# Patient Record
Sex: MALE | Race: BLACK OR AFRICAN AMERICAN | NOT HISPANIC OR LATINO | URBAN - METROPOLITAN AREA
[De-identification: names, ages, dates, MRNs, and addresses within clinical notes are randomized per-mention and may not be internally consistent; named-entity substitution may affect disease eponyms.]

---

## 2017-02-07 ENCOUNTER — LAB REQUISITION (OUTPATIENT)
Dept: LAB | Facility: HOSPITAL | Age: 42
End: 2017-02-07
Payer: COMMERCIAL

## 2017-02-07 DIAGNOSIS — H60.399 OTHER INFECTIVE OTITIS EXTERNA, UNSPECIFIED EAR: ICD-10-CM

## 2017-02-07 PROCEDURE — 87205 SMEAR GRAM STAIN: CPT | Performed by: OTOLARYNGOLOGY

## 2017-02-07 PROCEDURE — 87070 CULTURE OTHR SPECIMN AEROBIC: CPT | Performed by: OTOLARYNGOLOGY

## 2017-02-07 PROCEDURE — 87102 FUNGUS ISOLATION CULTURE: CPT | Performed by: OTOLARYNGOLOGY

## 2017-02-10 LAB
BACTERIA WND AEROBE CULT: NO GROWTH
GRAM STN SPEC: NORMAL

## 2017-03-13 LAB — FUNGUS SPEC CULT: NORMAL

## 2022-08-24 ENCOUNTER — HOSPITAL ENCOUNTER (EMERGENCY)
Facility: HOSPITAL | Age: 47
Discharge: HOME | End: 2022-08-24
Attending: EMERGENCY MEDICINE
Payer: COMMERCIAL

## 2022-08-24 ENCOUNTER — APPOINTMENT (EMERGENCY)
Dept: CT IMAGING | Facility: HOSPITAL | Age: 47
End: 2022-08-24
Payer: COMMERCIAL

## 2022-08-24 VITALS
HEART RATE: 59 BPM | BODY MASS INDEX: 39.32 KG/M2 | WEIGHT: 280.87 LBS | RESPIRATION RATE: 17 BRPM | HEIGHT: 71 IN | TEMPERATURE: 97.8 F | OXYGEN SATURATION: 100 % | SYSTOLIC BLOOD PRESSURE: 164 MMHG | DIASTOLIC BLOOD PRESSURE: 89 MMHG

## 2022-08-24 DIAGNOSIS — K92.2 LOWER GI BLEED: Primary | ICD-10-CM

## 2022-08-24 DIAGNOSIS — K57.90 DIVERTICULOSIS: ICD-10-CM

## 2022-08-24 LAB
ALBUMIN SERPL BCP-MCNC: 4.3 G/DL (ref 3.5–5)
ALP SERPL-CCNC: 56 U/L (ref 34–104)
ALT SERPL W P-5'-P-CCNC: 25 U/L (ref 7–52)
ANION GAP SERPL CALCULATED.3IONS-SCNC: 6 MMOL/L (ref 4–13)
ANION GAP SERPL CALCULATED.3IONS-SCNC: 6 MMOL/L (ref 4–13)
AST SERPL W P-5'-P-CCNC: 45 U/L (ref 13–39)
BASOPHILS # BLD AUTO: 0.02 THOUSANDS/ΜL (ref 0–0.1)
BASOPHILS NFR BLD AUTO: 0 % (ref 0–1)
BILIRUB SERPL-MCNC: 0.5 MG/DL (ref 0.2–1)
BUN SERPL-MCNC: 12 MG/DL (ref 5–25)
BUN SERPL-MCNC: 14 MG/DL (ref 5–25)
CALCIUM SERPL-MCNC: 9 MG/DL (ref 8.4–10.2)
CALCIUM SERPL-MCNC: 9.1 MG/DL (ref 8.4–10.2)
CARDIAC TROPONIN I PNL SERPL HS: <2 NG/L
CHLORIDE SERPL-SCNC: 108 MMOL/L (ref 96–108)
CHLORIDE SERPL-SCNC: 109 MMOL/L (ref 96–108)
CO2 SERPL-SCNC: 25 MMOL/L (ref 21–32)
CO2 SERPL-SCNC: 25 MMOL/L (ref 21–32)
CREAT SERPL-MCNC: 0.98 MG/DL (ref 0.6–1.3)
CREAT SERPL-MCNC: 1.02 MG/DL (ref 0.6–1.3)
EOSINOPHIL # BLD AUTO: 0.15 THOUSAND/ΜL (ref 0–0.61)
EOSINOPHIL NFR BLD AUTO: 2 % (ref 0–6)
ERYTHROCYTE [DISTWIDTH] IN BLOOD BY AUTOMATED COUNT: 15.5 % (ref 11.6–15.1)
FLUAV RNA RESP QL NAA+PROBE: NEGATIVE
FLUBV RNA RESP QL NAA+PROBE: NEGATIVE
GFR SERPL CREATININE-BSD FRML MDRD: 86 ML/MIN/1.73SQ M
GFR SERPL CREATININE-BSD FRML MDRD: 90 ML/MIN/1.73SQ M
GLUCOSE SERPL-MCNC: 89 MG/DL (ref 65–140)
GLUCOSE SERPL-MCNC: 90 MG/DL (ref 65–140)
HCT VFR BLD AUTO: 41.1 % (ref 36.5–49.3)
HGB BLD-MCNC: 12.9 G/DL (ref 12–17)
IMM GRANULOCYTES # BLD AUTO: 0.02 THOUSAND/UL (ref 0–0.2)
IMM GRANULOCYTES NFR BLD AUTO: 0 % (ref 0–2)
LIPASE SERPL-CCNC: 27 U/L (ref 11–82)
LYMPHOCYTES # BLD AUTO: 1.56 THOUSANDS/ΜL (ref 0.6–4.47)
LYMPHOCYTES NFR BLD AUTO: 25 % (ref 14–44)
MAGNESIUM SERPL-MCNC: 2.1 MG/DL (ref 1.9–2.7)
MCH RBC QN AUTO: 25.4 PG (ref 26.8–34.3)
MCHC RBC AUTO-ENTMCNC: 31.4 G/DL (ref 31.4–37.4)
MCV RBC AUTO: 81 FL (ref 82–98)
MONOCYTES # BLD AUTO: 0.37 THOUSAND/ΜL (ref 0.17–1.22)
MONOCYTES NFR BLD AUTO: 6 % (ref 4–12)
NEUTROPHILS # BLD AUTO: 4.08 THOUSANDS/ΜL (ref 1.85–7.62)
NEUTS SEG NFR BLD AUTO: 67 % (ref 43–75)
NRBC BLD AUTO-RTO: 0 /100 WBCS
PLATELET # BLD AUTO: 332 THOUSANDS/UL (ref 149–390)
PMV BLD AUTO: 10.2 FL (ref 8.9–12.7)
POTASSIUM SERPL-SCNC: 4 MMOL/L (ref 3.5–5.3)
POTASSIUM SERPL-SCNC: 5.9 MMOL/L (ref 3.5–5.3)
PROT SERPL-MCNC: 7.7 G/DL (ref 6.4–8.4)
RBC # BLD AUTO: 5.07 MILLION/UL (ref 3.88–5.62)
RSV RNA RESP QL NAA+PROBE: NEGATIVE
SARS-COV-2 RNA RESP QL NAA+PROBE: NEGATIVE
SODIUM SERPL-SCNC: 139 MMOL/L (ref 135–147)
SODIUM SERPL-SCNC: 140 MMOL/L (ref 135–147)
WBC # BLD AUTO: 6.2 THOUSAND/UL (ref 4.31–10.16)

## 2022-08-24 PROCEDURE — 96374 THER/PROPH/DIAG INJ IV PUSH: CPT

## 2022-08-24 PROCEDURE — 0241U HB NFCT DS VIR RESP RNA 4 TRGT: CPT | Performed by: PHYSICIAN ASSISTANT

## 2022-08-24 PROCEDURE — 74177 CT ABD & PELVIS W/CONTRAST: CPT

## 2022-08-24 PROCEDURE — 80053 COMPREHEN METABOLIC PANEL: CPT | Performed by: PHYSICIAN ASSISTANT

## 2022-08-24 PROCEDURE — 99284 EMERGENCY DEPT VISIT MOD MDM: CPT | Performed by: PHYSICIAN ASSISTANT

## 2022-08-24 PROCEDURE — 85025 COMPLETE CBC W/AUTO DIFF WBC: CPT | Performed by: PHYSICIAN ASSISTANT

## 2022-08-24 PROCEDURE — 99285 EMERGENCY DEPT VISIT HI MDM: CPT

## 2022-08-24 PROCEDURE — 36415 COLL VENOUS BLD VENIPUNCTURE: CPT | Performed by: PHYSICIAN ASSISTANT

## 2022-08-24 PROCEDURE — 93005 ELECTROCARDIOGRAM TRACING: CPT

## 2022-08-24 PROCEDURE — 84484 ASSAY OF TROPONIN QUANT: CPT | Performed by: PHYSICIAN ASSISTANT

## 2022-08-24 PROCEDURE — 83690 ASSAY OF LIPASE: CPT | Performed by: PHYSICIAN ASSISTANT

## 2022-08-24 PROCEDURE — 80048 BASIC METABOLIC PNL TOTAL CA: CPT | Performed by: PHYSICIAN ASSISTANT

## 2022-08-24 PROCEDURE — 96375 TX/PRO/DX INJ NEW DRUG ADDON: CPT

## 2022-08-24 PROCEDURE — 96361 HYDRATE IV INFUSION ADD-ON: CPT

## 2022-08-24 PROCEDURE — 83735 ASSAY OF MAGNESIUM: CPT | Performed by: PHYSICIAN ASSISTANT

## 2022-08-24 RX ORDER — ONDANSETRON 2 MG/ML
4 INJECTION INTRAMUSCULAR; INTRAVENOUS ONCE
Status: COMPLETED | OUTPATIENT
Start: 2022-08-24 | End: 2022-08-24

## 2022-08-24 RX ORDER — KETOROLAC TROMETHAMINE 30 MG/ML
15 INJECTION, SOLUTION INTRAMUSCULAR; INTRAVENOUS ONCE
Status: COMPLETED | OUTPATIENT
Start: 2022-08-24 | End: 2022-08-24

## 2022-08-24 RX ADMIN — ONDANSETRON 4 MG: 2 INJECTION INTRAMUSCULAR; INTRAVENOUS at 11:35

## 2022-08-24 RX ADMIN — KETOROLAC TROMETHAMINE 15 MG: 30 INJECTION, SOLUTION INTRAMUSCULAR at 13:48

## 2022-08-24 RX ADMIN — SODIUM CHLORIDE 1000 ML: 0.9 INJECTION, SOLUTION INTRAVENOUS at 12:51

## 2022-08-24 RX ADMIN — IOHEXOL 75 ML: 350 INJECTION, SOLUTION INTRAVENOUS at 12:45

## 2022-08-24 NOTE — ED NOTES
Patient transported to [address] [place] [address],[address], [facility]  08/24/22 1260

## 2022-08-24 NOTE — ED PROVIDER NOTES
History  Chief Complaint   Patient presents with    Rectal Bleeding     Pt c/o abdominal pain, headache, feeling weak, and rectal bleeding  Pt reports the rectal bleeding is bright and dark red and he sees it in his stool, the toliet, and when he wipes  Pt reports he had a toothache and the dentist started him on amoxicillin which is when this all started  Pt son is allergic to amoxicillin  Pt does not know if he has had amoxicillin before this time  Patient is a 42-year-old male with a past medical history asthma, prostate cancer and hypertension, presenting to the ED for evaluation of bloody stools x2 days  Patient states that his dentist started him on amoxicillin on  for a dental infection  Over the past 2 days, he has been having multiple bloody bowel movements throughout the day  He describes them as a dark maroon color mixed with bright red blood  His stools have been a mixture of diarrhea and solid stools  He reports associated nausea and generalized abdominal pain  He states that he is now starting to have generalized weakness, lightheadeness and fatigue  He also feels like he is becoming tired and "out of breath" with exertion more quickly than usual  He denies any fevers, chills, chest pain, orthopnea, lower extremity edema or calf tenderness  He denies any history of abdominal surgeries  He does report a history of radiation to treat his prostate cancer  Prior to Admission Medications   Prescriptions Last Dose Informant Patient Reported? Taking?    amLODIPine (NORVASC) 10 mg tablet 2022 at Unknown time  Yes Yes   Sig: Take 10 mg by mouth daily   atorvastatin (LIPITOR) 20 mg tablet 2022 at Unknown time  Yes Yes   Sig: Take 20 mg by mouth   celecoxib (CeleBREX) 100 mg capsule 2022 at Unknown time  Yes Yes   Sig: Take 1 capsule by mouth 2 (two) times a day   divalproex sodium (DEPAKOTE ER) 500 mg 24 hr tablet 2022 at Unknown time  Yes Yes   Si tab po qam and 2 tabs po qhs   leuprolide (LUPRON DEPOT 3 MONTH KIT) 22 5 mg injection   Yes No   Sig: Inject 22 5 mg into a muscle   lisinopril (ZESTRIL) 40 mg tablet 2022 at Unknown time  Yes Yes   Sig: Take 40 mg by mouth daily   prazosin (MINIPRESS) 1 mg capsule 2022 at Unknown time  Yes Yes   Si tab po qhs x 1 week then 2 tabs qhs   traMADol (ULTRAM) 50 mg tablet 2022 at Unknown time  Yes Yes   venlafaxine (EFFEXOR-XR) 150 mg 24 hr capsule 2022 at Unknown time  Yes Yes   Sig: Take 300 mg by mouth daily      Facility-Administered Medications: None       Past Medical History:   Diagnosis Date    Asthma     Cancer ([facility] [address] )     History of psychiatric treatment     Hypertension        Past Surgical History:   Procedure Laterality Date    US GUIDED FINE NEEDLE ASPIRATION (ALL INC)  2014       Family History   Problem Relation Age of Onset    Cancer Other     Hypertension Other     Hyperlipidemia Other      I have reviewed and agree with the history as documented  E-Cigarette/Vaping    E-Cigarette Use Never User      E-Cigarette/Vaping Substances     Social History     Tobacco Use    Smoking status: Never Smoker    Smokeless tobacco: Never Used   Vaping Use    Vaping Use: Never used   Substance Use Topics    Alcohol use: Not Currently    Drug use: Never       Review of Systems   Constitutional: Positive for fatigue  Negative for chills, diaphoresis and fever  HENT: Negative for congestion, ear pain, mouth sores, rhinorrhea, sinus pain, sore throat and trouble swallowing  Eyes: Negative for photophobia and visual disturbance  Respiratory: Negative for cough, chest tightness, shortness of breath and wheezing  Cardiovascular: Negative for chest pain, palpitations and leg swelling  Gastrointestinal: Positive for abdominal pain, blood in stool and nausea  Negative for constipation, diarrhea and vomiting     Genitourinary: Negative for dysuria, flank pain, frequency, hematuria and urgency  Musculoskeletal: Negative for arthralgias, back pain, gait problem, joint swelling, myalgias and neck pain  Skin: Negative for color change, pallor and rash  Neurological: Positive for weakness (Generalized) and light-headedness  Negative for dizziness, syncope, speech difficulty, numbness and headaches  All other systems reviewed and are negative  Physical Exam  Physical Exam  Vitals and nursing note reviewed  Constitutional:       General: He is awake  He is not in acute distress  Appearance: Normal appearance  He is well-developed  He is not ill-appearing or diaphoretic  HENT:      Head: Normocephalic and atraumatic  Right Ear: External ear normal       Left Ear: External ear normal       Nose: Nose normal       Mouth/Throat:      Lips: Pink  Mouth: Mucous membranes are moist    Eyes:      General: Lids are normal  No scleral icterus  Conjunctiva/sclera: Conjunctivae normal       Pupils: Pupils are equal, round, and reactive to light  Cardiovascular:      Rate and Rhythm: Normal rate and regular rhythm  Pulses: Normal pulses  Radial pulses are 2+ on the right side and 2+ on the left side  Heart sounds: Normal heart sounds, S1 normal and S2 normal    Pulmonary:      Effort: Pulmonary effort is normal  No accessory muscle usage  Breath sounds: Normal breath sounds  No stridor  No decreased breath sounds, wheezing, rhonchi or rales  Abdominal:      General: Abdomen is flat  Bowel sounds are normal  There is no distension  Palpations: Abdomen is soft  Tenderness: There is generalized abdominal tenderness  There is no right CVA tenderness, left CVA tenderness, guarding or rebound  Genitourinary:     Comments: No thrombosed/external hemorrhoids  No stool in rectal vault - unable to perform accurate guaiac  No evidence of active hemorrhage    Musculoskeletal:      Cervical back: Full passive range of motion without pain, normal range of motion and neck supple  No signs of trauma  No pain with movement  Normal range of motion  Right lower leg: No edema  Left lower leg: No edema  Lymphadenopathy:      Cervical: No cervical adenopathy  Skin:     General: Skin is warm and dry  Capillary Refill: Capillary refill takes less than 2 seconds  Coloration: Skin is not cyanotic, jaundiced or pale  Neurological:      Mental Status: He is alert and oriented to person, place, and time  GCS: GCS eye subscore is 4  GCS verbal subscore is 5  GCS motor subscore is 6  Cranial Nerves: No dysarthria or facial asymmetry  Gait: Gait normal    Psychiatric:         Attention and Perception: Attention normal          Mood and Affect: Mood normal          Speech: Speech normal          Behavior: Behavior normal  Behavior is cooperative           Vital Signs  ED Triage Vitals [08/24/22 1019]   Temperature Pulse Respirations Blood Pressure SpO2   97 8 °F (36 6 °C) 65 18 139/73 100 %      Temp Source Heart Rate Source Patient Position - Orthostatic VS BP Location FiO2 (%)   Oral Monitor Sitting Right arm --      Pain Score       8           Vitals:    08/24/22 1019 08/24/22 1145 08/24/22 1315   BP: 139/73 164/89    Pulse: 65 61 59   Patient Position - Orthostatic VS: Sitting Lying          Visual Acuity      ED Medications  Medications   ondansetron (ZOFRAN) injection 4 mg (4 mg Intravenous Given 8/24/22 1135)   sodium chloride 0 9 % bolus 1,000 mL (0 mL Intravenous Stopped 8/24/22 1530)   iohexol (OMNIPAQUE) 350 MG/ML injection (MULTI-DOSE) 75 mL (75 mL Intravenous Given 8/24/22 1245)   ketorolac (TORADOL) injection 15 mg (15 mg Intravenous Given 8/24/22 1348)       Diagnostic Studies  Results Reviewed     Procedure Component Value Units Date/Time    Basic metabolic panel [[id number]]  (Abnormal) Collected: 08/24/22 1345    Lab Status: Final result Specimen: Blood from Arm, Right Updated: 08/24/22 1410     Sodium 140 mmol/L      Potassium 4 0 mmol/L      Chloride 109 mmol/L      CO2 25 mmol/L      ANION GAP 6 mmol/L      BUN 12 mg/dL      Creatinine 0 98 mg/dL      Glucose 90 mg/dL      Calcium 9 0 mg/dL      eGFR 90 ml/min/1 73sq m     Narrative:      [place] guidelines for Chronic Kidney Disease (CKD):     Stage 1 with normal or high GFR (GFR > 90 mL/min/1 73 square meters)    Stage 2 Mild CKD (GFR = 60-89 mL/min/1 73 square meters)    Stage 3A Moderate CKD (GFR = 45-59 mL/min/1 73 square meters)    Stage 3B Moderate CKD (GFR = 30-44 mL/min/1 73 square meters)    Stage 4 Severe CKD (GFR = 15-29 mL/min/1 73 square meters)    Stage 5 End Stage CKD (GFR <15 mL/min/1 73 square meters)  Note: GFR calculation is accurate only with a steady state creatinine    FLU/RSV/COVID - if FLU/RSV clinically relevant [[id number]]  (Normal) Collected: 08/24/22 1251    Lab Status: Final result Specimen: Nares from Nose Updated: 08/24/22 1336     SARS-CoV-2 Negative     INFLUENZA A PCR Negative     INFLUENZA B PCR Negative     RSV PCR Negative    Narrative:      FOR PEDIATRIC PATIENTS - copy/paste COVID Guidelines URL to browser: [email or web address]    SARS-CoV-2 assay is a Nucleic Acid Amplification assay intended for the  qualitative detection of nucleic acid from SARS-CoV-2 in nasopharyngeal  swabs  Results are for the presumptive identification of SARS-CoV-2 RNA  Positive results are indicative of infection with SARS-CoV-2, the virus  causing COVID-19, but do not rule out bacterial infection or co-infection  with other viruses  Laboratories within the [place] and its  territories are required to report all positive results to the appropriate  public health authorities  Negative results do not preclude SARS-CoV-2  infection and should not be used as the sole basis for treatment or other  patient management decisions   Negative results must be combined with  clinical observations, patient history, and epidemiological information  This test has not been FDA cleared or approved  This test has been authorized by FDA under an Emergency Use Authorization  (EUA)  This test is only authorized for the duration of time the  declaration that circumstances exist justifying the authorization of the  emergency use of an in vitro diagnostic tests for detection of SARS-CoV-2  virus and/or diagnosis of COVID-19 infection under section 564(b)(1) of  the Act, 21 U  S C  235ISN-4(F)(0), unless the authorization is terminated  or revoked sooner  The test has been validated but independent review by FDA  and CLIA is pending  Test performed using [employer] GeneXpert: This RT-PCR assay targets N2,  a region unique to SARS-CoV-2  A conserved region in the E-gene was chosen  for pan-Sarbecovirus detection which includes SARS-CoV-2      HS Troponin 0hr (reflex protocol) [[id number]]  (Normal) Collected: 08/24/22 1134    Lab Status: Final result Specimen: Blood from Arm, Left Updated: 08/24/22 1250     hs TnI 0hr <2 0 ng/L     Comprehensive metabolic panel [[id number]]  (Abnormal) Collected: 08/24/22 1134    Lab Status: Final result Specimen: Blood from Arm, Left Updated: 08/24/22 1223     Sodium 139 mmol/L      Potassium 5 9 mmol/L      Chloride 108 mmol/L      CO2 25 mmol/L      ANION GAP 6 mmol/L      BUN 14 mg/dL      Creatinine 1 02 mg/dL      Glucose 89 mg/dL      Calcium 9 1 mg/dL      AST 45 U/L      ALT 25 U/L      Alkaline Phosphatase 56 U/L      Total Protein 7 7 g/dL      Albumin 4 3 g/dL      Total Bilirubin 0 50 mg/dL      eGFR 86 ml/min/1 73sq m     Narrative:      [place] guidelines for Chronic Kidney Disease (CKD):     Stage 1 with normal or high GFR (GFR > 90 mL/min/1 73 square meters)    Stage 2 Mild CKD (GFR = 60-89 mL/min/1 73 square meters)    Stage 3A Moderate CKD (GFR = 45-59 mL/min/1 73 square meters)    Stage 3B Moderate CKD (GFR = 30-44 mL/min/1 73 square meters)    Stage 4 Severe CKD (GFR = 15-29 mL/min/1 73 square meters)    Stage 5 End Stage CKD (GFR <15 mL/min/1 73 square meters)  Note: GFR calculation is accurate only with a steady state creatinine    Lipase [[id number]]  (Normal) Collected: 08/24/22 1134    Lab Status: Final result Specimen: Blood from Arm, Left Updated: 08/24/22 1221     Lipase 27 u/L     Magnesium [[id number]]  (Normal) Collected: 08/24/22 1134    Lab Status: Final result Specimen: Blood from Arm, Left Updated: 08/24/22 1221     Magnesium 2 1 mg/dL     CBC and differential [[id number]]  (Abnormal) Collected: 08/24/22 1134    Lab Status: Final result Specimen: Blood from Arm, Left Updated: 08/24/22 1202     WBC 6 20 Thousand/uL      RBC 5 07 Million/uL      Hemoglobin 12 9 g/dL      Hematocrit 41 1 %      MCV 81 fL      MCH 25 4 pg      MCHC 31 4 g/dL      RDW 15 5 %      MPV 10 2 fL      Platelets 257 Thousands/uL      nRBC 0 /100 WBCs      Neutrophils Relative 67 %      Immat GRANS % 0 %      Lymphocytes Relative 25 %      Monocytes Relative 6 %      Eosinophils Relative 2 %      Basophils Relative 0 %      Neutrophils Absolute 4 08 Thousands/µL      Immature Grans Absolute 0 02 Thousand/uL      Lymphocytes Absolute 1 56 Thousands/µL      Monocytes Absolute 0 37 Thousand/µL      Eosinophils Absolute 0 15 Thousand/µL      Basophils Absolute 0 02 Thousands/µL                  CT abdomen pelvis with contrast   Final Result by Lou Pennington DO (08/24 1323)      No acute intra-abdominal abnormality  Colonic diverticulosis without evidence of diverticulitis  Several incidental findings as above        Workstation performed: [id number]                    Procedures  ECG 12 Lead Documentation Only    Date/Time: 8/24/2022 11:47 AM  Performed by: Danika Barron PA-C  Authorized by: Danika Barron PA-C     Indications / Diagnosis:  Weakness  ECG reviewed by me, the ED Provider: yes    Patient location: ED  Previous ECG:     Previous ECG:  Unavailable    Comparison to cardiac monitor: Yes    Interpretation:     Interpretation: non-specific    Rate:     ECG rate:  52    ECG rate assessment: bradycardic    Rhythm:     Rhythm: sinus bradycardia    Ectopy:     Ectopy: none    QRS:     QRS axis:  Normal    QRS intervals:  Normal  Conduction:     Conduction: normal    ST segments:     ST segments:  Normal  T waves:     T waves: normal    Comments:      No STEMI  QT/QTc 478/444             ED Course             HEART Risk Score    Flowsheet Row Most Recent Value   Heart Score Risk Calculator    History 0 Filed at: 08/24/2022 1252   ECG 0 Filed at: 08/24/2022 1252   Age 1 Filed at: 08/24/2022 1252   Risk Factors 1 Filed at: 08/24/2022 1252   Troponin 0 Filed at: 08/24/2022 1252   HEART Score 2 Filed at: 08/24/2022 1252                PERC Rule for PE    Flowsheet Row Most Recent Value   PERC Rule for PE    Age >=50 0 Filed at: 08/24/2022 1252   HR >=100 0 Filed at: 08/24/2022 1252   O2 Sat on room air < 95% 0 Filed at: 08/24/2022 1252   History of PE or DVT 0 Filed at: 08/24/2022 1252   Recent trauma or surgery 0 Filed at: 08/24/2022 1252   Hemoptysis 0 Filed at: 08/24/2022 1252   Exogenous estrogen 0 Filed at: 08/24/2022 1252   Unilateral leg swelling 0 Filed at: 08/24/2022 1252   PERC Rule for PE Results 0 Filed at: 08/24/2022 1252              SBIRT 22yo+    Flowsheet Row Most Recent Value   SBIRT (25 yo +)    In order to provide better care to our patients, we are screening all of our patients for alcohol and drug use  Would it be okay to ask you these screening questions? Unable to answer at this time Filed at: 08/24/2022 1027                    MDM  Number of Diagnoses or Management Options  Diverticulosis  Lower GI bleed  Diagnosis management comments: Patient is a 39-year-old male with a past medical history asthma cancer and hypertension, presenting to the ED for evaluation of bloody stools x2 days     CT shows no acute abnormalities  Hgb is within normal limits at 12 9 (most recent in 2021 was 14 5)  Patient had resolve of abdominal discomfort and had no bowel movements or bleeding in the ED  No evidence of hemorrhage on rectal exam   Patient is feeling significantly improved at this time and would prefer to proceed with outpatient GI follow-up/colonoscopy  Discussed with the GI advanced practitioner who will ensure patient has prompt follow-up for further evaluation  Patient was instructed to return immediately if he continued to have bloody bowel movements, worsening abdominal pain or developed new/worsening symptoms  The management plan was discussed in detail with the patient at bedside and all questions were answered  Strict ED return instructions were discussed at bedside  Prior to discharge, both verbal and written instructions were provided  We discussed the signs and symptoms that should prompt the patient to return to the ED  All questions were answered and the patient was comfortable with the plan of care and discharged home  The patient agrees to return to the Emergency Department for concerns and/or progression of illness         Amount and/or Complexity of Data Reviewed  Clinical lab tests: ordered and reviewed  Tests in the radiology section of CPT®: ordered and reviewed  Discuss the patient with other providers: yes QUINTON Ordonez (GI))    Patient Progress  Patient progress: stable      Disposition  Final diagnoses:   Lower GI bleed   Diverticulosis     Time reflects when diagnosis was documented in both MDM as applicable and the Disposition within this note     Time User Action Codes Description Comment    8/24/2022  3:17 PM Lon Tsang [K92 2] Lower GI bleed     8/24/2022  3:17 PM Lon Tsang [K57 90] Diverticulosis       ED Disposition     ED Disposition   Discharge    Condition   Stable    Date/Time   Wed Aug 24, 2022  3:17 PM    Comment   Ford Dutton discharge to home/self care                 Follow-up Information     Follow up With Specialties Details Why Contact Info Additional [facility] Schedule an appointment as soon as possible for a visit   [address]  [address], [address] [phone] [facility] [place], [address], [address], [place], [place], [phone]    Madelaine Boeck, DO Internal Medicine Schedule an appointment as soon as possible for a visit        [facility] Emergency Medicine  If symptoms worsen [address] [facility] [address] [facility], [address], [place], [place], [address]          Discharge Medication List as of 8/24/2022  3:18 PM      CONTINUE these medications which have NOT CHANGED    Details   amLODIPine (NORVASC) 10 mg tablet Take 10 mg by mouth daily, Starting Mon 8/15/2011, Historical Med      atorvastatin (LIPITOR) 20 mg tablet Take 20 mg by mouth, Starting Tue 8/24/2021, Historical Med      celecoxib (CeleBREX) 100 mg capsule Take 1 capsule by mouth 2 (two) times a day, Starting Tue 8/3/2021, Historical Med      divalproex sodium (DEPAKOTE ER) 500 mg 24 hr tablet 2 tab po qam and 2 tabs po qhs, Historical Med      lisinopril (ZESTRIL) 40 mg tablet Take 40 mg by mouth daily, Starting Tue 8/24/2021, Historical Med      prazosin (MINIPRESS) 1 mg capsule 1 tab po qhs x 1 week then 2 tabs qhs, Historical Med      traMADol (ULTRAM) 50 mg tablet Starting Fri 10/22/2021, Historical Med      venlafaxine (EFFEXOR-XR) 150 mg 24 hr capsule Take 300 mg by mouth daily, Starting Wed 10/27/2021, Until Thu 10/27/2022, Historical Med      leuprolide (LUPRON DEPOT 3 MONTH KIT) 22 5 mg injection Inject 22 5 mg into a muscle, Historical Med                 PDMP Review     None          ED Provider  Electronically Signed by           Brittney Vazquez PA-C  08/24/22 7443

## 2022-08-24 NOTE — Clinical Note
Fidelina Saez was seen and treated in our emergency department on 8/24/2022  Diagnosis:     Berenice Jean Baptiste  may return to work on return date  He may return on this date: 08/26/2022         If you have any questions or concerns, please don't hesitate to call        Kera Lopez PA-C    ______________________________           _______________          _______________  Hospital Representative                              Date                                Time

## 2022-08-25 LAB
ATRIAL RATE: 52 BPM
P AXIS: 41 DEGREES
PR INTERVAL: 202 MS
QRS AXIS: 54 DEGREES
QRSD INTERVAL: 74 MS
QT INTERVAL: 478 MS
QTC INTERVAL: 444 MS
T WAVE AXIS: 45 DEGREES
VENTRICULAR RATE: 52 BPM

## 2022-08-25 PROCEDURE — 93010 ELECTROCARDIOGRAM REPORT: CPT | Performed by: INTERNAL MEDICINE

## 2022-09-06 ENCOUNTER — OFFICE VISIT (OUTPATIENT)
Dept: GASTROENTEROLOGY | Facility: AMBULARY SURGERY CENTER | Age: 47
End: 2022-09-06
Payer: MEDICARE

## 2022-09-06 VITALS
OXYGEN SATURATION: 98 % | WEIGHT: 285 LBS | HEIGHT: 71 IN | BODY MASS INDEX: 39.9 KG/M2 | HEART RATE: 73 BPM | SYSTOLIC BLOOD PRESSURE: 150 MMHG | DIASTOLIC BLOOD PRESSURE: 99 MMHG

## 2022-09-06 DIAGNOSIS — K57.90 DIVERTICULOSIS: ICD-10-CM

## 2022-09-06 DIAGNOSIS — K92.2 LOWER GI BLEED: ICD-10-CM

## 2022-09-06 DIAGNOSIS — K21.9 GASTROESOPHAGEAL REFLUX DISEASE, UNSPECIFIED WHETHER ESOPHAGITIS PRESENT: Primary | ICD-10-CM

## 2022-09-06 PROCEDURE — 99204 OFFICE O/P NEW MOD 45 MIN: CPT | Performed by: INTERNAL MEDICINE

## 2022-09-06 RX ORDER — METHYLPHENIDATE HYDROCHLORIDE 5 MG/1
TABLET ORAL AS NEEDED
COMMUNITY
Start: 2022-08-12

## 2022-09-06 RX ORDER — SODIUM PICOSULFATE, MAGNESIUM OXIDE, AND ANHYDROUS CITRIC ACID 10; 3.5; 12 MG/160ML; G/160ML; G/160ML
LIQUID ORAL
Qty: 320 ML | Refills: 0 | Status: SHIPPED | OUTPATIENT
Start: 2022-09-06

## 2022-09-06 NOTE — PROGRESS NOTES
Consultation -  [facility]  Levon Perez 48 y o  male MRN: [id number]  Unit/Bed#:  Encounter: [id number]        Consults    ASSESSMENT/PLAN:       1  Rectal bleeding-differential includes infectious etiology versus radiation proctitis versus bleeding from hemorrhoids or less likely diverticulosis versus colon polyp or lesion  -recommend high-fiber diet  -recommend starting on a daily probiotic     -recommend colonoscopy at this time to assess for colon polyps/lesions and radiation proctitis  Patient was explained about  the risks and benefits of the procedure  Risks including but not limited to bleeding, infection, perforation were explained in detail  Also explained about less than 100% sensitivity with the exam and other alternatives  2  Chronic GERD with CT scan 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-  -will plan for EGD for further evaluation  -follow anti-reflux measures, follow non ulcerogenic diet     -avoid NSAIDs  3  Subcentimeter right hepatic lobe lesion-likely a subcentimeter cyst, no suspicious solid hepatic lesions seen  4  Follow-up after the procedures  ______________________________________________________________________    Reason for Consult / Principal Problem: [unfilled]    HPI: Levon Perez is a 48y o  year old male history of hypertension, prostate cancer status post surgery followed by Lupron injections every 3 months, being followed by [address] [place] [address] in [facility] for evaluation of rectal bleeding  Patient was in the emergency room 10 days ago for symptoms of rectal bleeding  Patient reports that symptoms began after starting amoxicillin for dental infection  Patient reports that he started having bloody stools for several days throughout the day after starting antibiotics  He describes them as dark maroon mixed with bright red blood  Reports that this is starting to clear up    Reports that the bowel movements are starting to become more regular  He reports that he last underwent colonoscopy in 2015 after having rectal bleeding soon after radiation  He reports that he also has symptoms of acid reflux which she manages with diet alone  No dysphagia, odynophagia but does report some loss of appetite and nausea  Denies any hematemesis, coffee-ground emesis or melena  Reports having undergone EGD in 2011 after having nose bleed  No significant weight loss  Patient has been changing his diet recently and has been doing intermittent fasting  Review of Systems: The remainder of the review of systems was negative except for the pertinent positives noted in HPI  Historical Information   Past Medical History:   Diagnosis Date    Asthma     Cancer ([facility] [address] [facility]     Colon polyp     Diverticulosis     History of psychiatric treatment     Hypertension      Past Surgical History:   Procedure Laterality Date    COLONOSCOPY      UPPER GASTROINTESTINAL ENDOSCOPY      US GUIDED FINE NEEDLE ASPIRATION (ALL INC)  12/16/2014     Social History   Social History     Substance and Sexual Activity   Alcohol Use Not Currently     Social History     Substance and Sexual Activity   Drug Use Never     Social History     Tobacco Use   Smoking Status Never Smoker   Smokeless Tobacco Never Used     Family History   Problem Relation Age of Onset    Cancer Other     Hypertension Other     Hyperlipidemia Other        Meds/Allergies     (Not in a hospital admission)    No current facility-administered medications for this visit  Allergies   Allergen Reactions    Amoxicillin Diarrhea       Objective     Blood pressure 150/99, pulse 73, height 5' 11" (1 803 m), weight 129 kg (285 lb), SpO2 98 %      [unfilled]    PHYSICAL EXAM     GEN: well nourished, well developed, no acute distress  HEENT: anicteric, MMM, no cervical or supraclavicular lymphadenopathy  CV: RRR, no m/r/g  CHEST: CTA b/l, no WRR  ABD: +BS, soft, NT/ND, no hepatosplenomegaly  EXT: no c/c/e  SKIN: no rashes,  NEURO: aaox3    Lab Results:   No visits with results within 1 Day(s) from this visit     Latest known visit with results is:   Admission on 08/24/2022, Discharged on 08/24/2022   Component Date Value    Sodium 08/24/2022 139     Potassium 08/24/2022 5 9 (A)    Chloride 08/24/2022 108     CO2 08/24/2022 25     ANION GAP 08/24/2022 6     BUN 08/24/2022 14     Creatinine 08/24/2022 1 02     Glucose 08/24/2022 89     Calcium 08/24/2022 9 1     AST 08/24/2022 45 (A)    ALT 08/24/2022 25     Alkaline Phosphatase 08/24/2022 56     Total Protein 08/24/2022 7 7     Albumin 08/24/2022 4 3     Total Bilirubin 08/24/2022 0 50     eGFR 08/24/2022 86     WBC 08/24/2022 6 20     RBC 08/24/2022 5 07     Hemoglobin 08/24/2022 12 9     Hematocrit 08/24/2022 41 1     MCV 08/24/2022 81 (A)    MCH 08/24/2022 25 4 (A)    MCHC 08/24/2022 31 4     RDW 08/24/2022 15 5 (A)    MPV 08/24/2022 10 2     Platelets 49/80/1721 332     nRBC 08/24/2022 0     Neutrophils Relative 08/24/2022 67     Immat GRANS % 08/24/2022 0     Lymphocytes Relative 08/24/2022 25     Monocytes Relative 08/24/2022 6     Eosinophils Relative 08/24/2022 2     Basophils Relative 08/24/2022 0     Neutrophils Absolute 08/24/2022 4 08     Immature Grans Absolute 08/24/2022 0 02     Lymphocytes Absolute 08/24/2022 1 56     Monocytes Absolute 08/24/2022 0 37     Eosinophils Absolute 08/24/2022 0 15     Basophils Absolute 08/24/2022 0 02     Lipase 08/24/2022 27     hs TnI 0hr 08/24/2022 <2 0     Magnesium 08/24/2022 2 1     Ventricular Rate 08/24/2022 52     Atrial Rate 08/24/2022 52     IL Interval 08/24/2022 202     QRSD Interval 08/24/2022 74     QT Interval 08/24/2022 478     QTC Interval 08/24/2022 444     P Axis 08/24/2022 41     QRS Axis 08/24/2022 54     T Wave Axis 08/24/2022 45     SARS-CoV-2 08/24/2022 Negative     INFLUENZA A PCR 08/24/2022 Negative     INFLUENZA B PCR 08/24/2022 Negative     RSV PCR 08/24/2022 Negative     Sodium 08/24/2022 140     Potassium 08/24/2022 4 0     Chloride 08/24/2022 109 (A)    CO2 08/24/2022 25     ANION GAP 08/24/2022 6     BUN 08/24/2022 12     Creatinine 08/24/2022 0 98     Glucose 08/24/2022 90     Calcium 08/24/2022 9 0     eGFR 08/24/2022 90      Imaging Studies: I have personally reviewed pertinent films in PACS

## 2022-09-06 NOTE — LETTER
September 6, 2022     Ayesha Marcano [address]  [address] [employer]  [address] [place] [address]    Patient: Mendez Johnston   YOB: 1975   Date of Visit: 9/6/2022       Dear Dr Darrick Varghese: Thank you for referring Alexandro Truong to me for evaluation  Below are my notes for this consultation  If you have questions, please do not hesitate to call me  I look forward to following your patient along with you  Sincerely,        Carolyn Faria MD        CC: No Recipients  Carolyn Faria MD  9/6/2022 11:47 AM  Sign when Signing Visit  Consultation - [facility]  Mendez Johnston 48 y o  male MRN: [id number]  Unit/Bed#:  Encounter: [id number]        Consults    ASSESSMENT/PLAN:       1  Rectal bleeding-differential includes infectious etiology versus radiation proctitis versus bleeding from hemorrhoids or less likely diverticulosis versus colon polyp or lesion  -recommend high-fiber diet  -recommend starting on a daily probiotic     -recommend colonoscopy at this time to assess for colon polyps/lesions and radiation proctitis  Patient was explained about  the risks and benefits of the procedure  Risks including but not limited to bleeding, infection, perforation were explained in detail  Also explained about less than 100% sensitivity with the exam and other alternatives  2  Chronic GERD with CT scan 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-  -will plan for EGD for further evaluation  -follow anti-reflux measures, follow non ulcerogenic diet     -avoid NSAIDs  3  Subcentimeter right hepatic lobe lesion-likely a subcentimeter cyst, no suspicious solid hepatic lesions seen  4  Follow-up after the procedures      ______________________________________________________________________    Reason for Consult / Principal Problem: [unfilled]    HPI: Mendez Johnston is a 48y o  year old male history of hypertension, prostate cancer status post surgery followed by Lupron injections every 3 months, being followed by [address] [facility] in [facility], presents for evaluation of rectal bleeding  Patient was in the emergency room 10 days ago for symptoms of rectal bleeding  Patient reports that symptoms began after starting amoxicillin for dental infection  Patient reports that he started having bloody stools for several days throughout the day after starting antibiotics  He describes them as dark maroon mixed with bright red blood  Reports that this is starting to clear up  Reports that the bowel movements are starting to become more regular  He reports that he last underwent colonoscopy in 2015 after having rectal bleeding soon after radiation  He reports that he also has symptoms of acid reflux which she manages with diet alone  No dysphagia, odynophagia but does report some loss of appetite and nausea  Denies any hematemesis, coffee-ground emesis or melena  Reports having undergone EGD in 2011 after having nose bleed  No significant weight loss  Patient has been changing his diet recently and has been doing intermittent fasting  Review of Systems: The remainder of the review of systems was negative except for the pertinent positives noted in HPI       Historical Information   Past Medical History:   Diagnosis Date    Asthma     Cancer ([facility] [address] [facility]     Colon polyp     Diverticulosis     History of psychiatric treatment     Hypertension      Past Surgical History:   Procedure Laterality Date    COLONOSCOPY      UPPER GASTROINTESTINAL ENDOSCOPY      US GUIDED FINE NEEDLE ASPIRATION (ALL INC)  12/16/2014     Social History   Social History     Substance and Sexual Activity   Alcohol Use Not Currently     Social History     Substance and Sexual Activity   Drug Use Never     Social History     Tobacco Use   Smoking Status Never Smoker   Smokeless Tobacco Never Used     Family History   Problem Relation Age of Onset    Cancer Other     Hypertension Other     Hyperlipidemia Other        Meds/Allergies     (Not in a hospital admission)    No current facility-administered medications for this visit  Allergies   Allergen Reactions    Amoxicillin Diarrhea       Objective     Blood pressure 150/99, pulse 73, height 5' 11" (1 803 m), weight 129 kg (285 lb), SpO2 98 %  [unfilled]    PHYSICAL EXAM     GEN: well nourished, well developed, no acute distress  HEENT: anicteric, MMM, no cervical or supraclavicular lymphadenopathy  CV: RRR, no m/r/g  CHEST: CTA b/l, no WRR  ABD: +BS, soft, NT/ND, no hepatosplenomegaly  EXT: no c/c/e  SKIN: no rashes,  NEURO: aaox3    Lab Results:   No visits with results within 1 Day(s) from this visit     Latest known visit with results is:   Admission on 08/24/2022, Discharged on 08/24/2022   Component Date Value    Sodium 08/24/2022 139     Potassium 08/24/2022 5 9 (A)    Chloride 08/24/2022 108     CO2 08/24/2022 25     ANION GAP 08/24/2022 6     BUN 08/24/2022 14     Creatinine 08/24/2022 1 02     Glucose 08/24/2022 89     Calcium 08/24/2022 9 1     AST 08/24/2022 45 (A)    ALT 08/24/2022 25     Alkaline Phosphatase 08/24/2022 56     Total Protein 08/24/2022 7 7     Albumin 08/24/2022 4 3     Total Bilirubin 08/24/2022 0 50     eGFR 08/24/2022 86     WBC 08/24/2022 6 20     RBC 08/24/2022 5 07     Hemoglobin 08/24/2022 12 9     Hematocrit 08/24/2022 41 1     MCV 08/24/2022 81 (A)    MCH 08/24/2022 25 4 (A)    MCHC 08/24/2022 31 4     RDW 08/24/2022 15 5 (A)    MPV 08/24/2022 10 2     Platelets 06/77/0326 332     nRBC 08/24/2022 0     Neutrophils Relative 08/24/2022 67     Immat GRANS % 08/24/2022 0     Lymphocytes Relative 08/24/2022 25     Monocytes Relative 08/24/2022 6     Eosinophils Relative 08/24/2022 2     Basophils Relative 08/24/2022 0     Neutrophils Absolute 08/24/2022 4 08     Immature Grans Absolute 08/24/2022 0 02     Lymphocytes Absolute 08/24/2022 1 56  Monocytes Absolute 08/24/2022 0 37     Eosinophils Absolute 08/24/2022 0 15     Basophils Absolute 08/24/2022 0 02     Lipase 08/24/2022 27     hs TnI 0hr 08/24/2022 <2 0     Magnesium 08/24/2022 2 1     Ventricular Rate 08/24/2022 52     Atrial Rate 08/24/2022 52     MT Interval 08/24/2022 202     QRSD Interval 08/24/2022 74     QT Interval 08/24/2022 478     QTC Interval 08/24/2022 444     P Axis 08/24/2022 41     QRS Axis 08/24/2022 54     T Wave Axis 08/24/2022 45     SARS-CoV-2 08/24/2022 Negative     INFLUENZA A PCR 08/24/2022 Negative     INFLUENZA B PCR 08/24/2022 Negative     RSV PCR 08/24/2022 Negative     Sodium 08/24/2022 140     Potassium 08/24/2022 4 0     Chloride 08/24/2022 109 (A)    CO2 08/24/2022 25     ANION GAP 08/24/2022 6     BUN 08/24/2022 12     Creatinine 08/24/2022 0 98     Glucose 08/24/2022 90     Calcium 08/24/2022 9 0     eGFR 08/24/2022 90      Imaging Studies: I have personally reviewed pertinent films in PACS

## 2022-09-08 NOTE — PATIENT INSTRUCTIONS
Scheduled date of EGD/colonoscopy (as of today):10/19/2022  Physician performing EGD/colonoscopy:DR ARDON  Location of EGD/colonoscopy:[facility]  Desired bowel prep reviewed with patient:CLENPIQ PER DR TATEXVM  Instructions reviewed with patient by:ADÁN DOSS  Clearances:

## 2022-10-07 DIAGNOSIS — Z20.822 COVID-19 RULED OUT BY LABORATORY TESTING: Primary | ICD-10-CM

## 2022-10-18 RX ORDER — SODIUM CHLORIDE, SODIUM LACTATE, POTASSIUM CHLORIDE, CALCIUM CHLORIDE 600; 310; 30; 20 MG/100ML; MG/100ML; MG/100ML; MG/100ML
125 INJECTION, SOLUTION INTRAVENOUS CONTINUOUS
Status: CANCELLED | OUTPATIENT
Start: 2022-10-18

## 2022-10-19 ENCOUNTER — ANESTHESIA EVENT (OUTPATIENT)
Dept: GASTROENTEROLOGY | Facility: AMBULARY SURGERY CENTER | Age: 47
End: 2022-10-19

## 2022-10-19 ENCOUNTER — HOSPITAL ENCOUNTER (OUTPATIENT)
Dept: GASTROENTEROLOGY | Facility: AMBULARY SURGERY CENTER | Age: 47
Setting detail: OUTPATIENT SURGERY
Discharge: HOME | End: 2022-10-19
Attending: INTERNAL MEDICINE
Payer: COMMERCIAL

## 2022-10-19 ENCOUNTER — ANESTHESIA (OUTPATIENT)
Dept: GASTROENTEROLOGY | Facility: AMBULARY SURGERY CENTER | Age: 47
End: 2022-10-19

## 2022-10-19 VITALS
OXYGEN SATURATION: 100 % | BODY MASS INDEX: 38.63 KG/M2 | DIASTOLIC BLOOD PRESSURE: 59 MMHG | RESPIRATION RATE: 18 BRPM | HEART RATE: 53 BPM | WEIGHT: 277 LBS | TEMPERATURE: 97.1 F | SYSTOLIC BLOOD PRESSURE: 124 MMHG

## 2022-10-19 DIAGNOSIS — K92.2 LOWER GI BLEED: ICD-10-CM

## 2022-10-19 DIAGNOSIS — K21.9 GASTROESOPHAGEAL REFLUX DISEASE, UNSPECIFIED WHETHER ESOPHAGITIS PRESENT: ICD-10-CM

## 2022-10-19 RX ORDER — PROPOFOL 10 MG/ML
INJECTION, EMULSION INTRAVENOUS CONTINUOUS PRN
Status: DISCONTINUED | OUTPATIENT
Start: 2022-10-19 | End: 2022-10-19

## 2022-10-19 RX ORDER — GLYCOPYRROLATE 0.2 MG/ML
INJECTION INTRAMUSCULAR; INTRAVENOUS AS NEEDED
Status: DISCONTINUED | OUTPATIENT
Start: 2022-10-19 | End: 2022-10-19

## 2022-10-19 RX ORDER — SODIUM CHLORIDE, SODIUM LACTATE, POTASSIUM CHLORIDE, CALCIUM CHLORIDE 600; 310; 30; 20 MG/100ML; MG/100ML; MG/100ML; MG/100ML
125 INJECTION, SOLUTION INTRAVENOUS CONTINUOUS
Status: DISCONTINUED | OUTPATIENT
Start: 2022-10-19 | End: 2022-10-23 | Stop reason: HOSPADM

## 2022-10-19 RX ORDER — PROPOFOL 10 MG/ML
INJECTION, EMULSION INTRAVENOUS AS NEEDED
Status: DISCONTINUED | OUTPATIENT
Start: 2022-10-19 | End: 2022-10-19

## 2022-10-19 RX ADMIN — GLYCOPYRROLATE 0.2 MCG: 0.2 INJECTION, SOLUTION INTRAMUSCULAR; INTRAVENOUS at 07:59

## 2022-10-19 RX ADMIN — PROPOFOL 200 MG: 10 INJECTION, EMULSION INTRAVENOUS at 08:00

## 2022-10-19 RX ADMIN — PROPOFOL 130 MCG/KG/MIN: 10 INJECTION, EMULSION INTRAVENOUS at 08:00

## 2022-10-19 RX ADMIN — SODIUM CHLORIDE, SODIUM LACTATE, POTASSIUM CHLORIDE, AND CALCIUM CHLORIDE: .6; .31; .03; .02 INJECTION, SOLUTION INTRAVENOUS at 07:51

## 2022-10-19 RX ADMIN — PROPOFOL 100 MG: 10 INJECTION, EMULSION INTRAVENOUS at 08:05

## 2022-10-19 NOTE — ANESTHESIA POSTPROCEDURE EVALUATION
Post-Op Assessment Note    CV Status:  Stable  Pain Score: 0    Pain management: adequate     Mental Status:  Sleepy and arousable   Hydration Status:  Stable   Airway Patency:  Patent and adequate      Post Op Vitals Reviewed: Yes      Staff: CRNA         No complications documented      BP      Temp     Pulse     Resp      SpO2

## 2022-10-19 NOTE — H&P
History and Physical -  [facility]  Tanner Ramey 48 y o  male MRN: [id number]    HPI: Tanner Ramey is a 48y o  year old male who presents for evaluation of chronic GERD and rectal bleeding  Review of Systems    Historical Information   Past Medical History:   Diagnosis Date   • Asthma    • Blood in the stool    • Cancer [facility]) 2014    prostate   • Colon polyp    • Diverticulosis    • GERD (gastroesophageal reflux disease)    • History of psychiatric treatment    • History of therapeutic radiation 2015    x 36 rounds   • Hypertension    • Wears glasses      Past Surgical History:   Procedure Laterality Date   • COLONOSCOPY     • PROSTATECTOMY  2014   • UPPER GASTROINTESTINAL ENDOSCOPY     • US GUIDED FINE NEEDLE ASPIRATION (ALL INC)  12/16/2014     Social History   Social History     Substance and Sexual Activity   Alcohol Use Not Currently     Social History     Substance and Sexual Activity   Drug Use Never     Social History     Tobacco Use   Smoking Status Never Smoker   Smokeless Tobacco Never Used     Family History   Problem Relation Age of Onset   • Cancer Other    • Hypertension Other    • Hyperlipidemia Other        Meds/Allergies     (Not in a hospital admission)      Allergies   Allergen Reactions   • Amoxicillin Diarrhea       Objective     /83   Pulse 64   Temp (!) 97 1 °F (36 2 °C)   Resp 16   Wt 126 kg (277 lb)   SpO2 100%   BMI 38 63 kg/m²       PHYSICAL EXAM    Gen: NAD  CV: RRR  CHEST: Clear  ABD: soft, NT/ND  EXT: no edema  Neuro: AAO      ASSESSMENT/PLAN:  This is a 48y o  year old male here for evaluation of rectal bleeding and chronic GERD symptoms  PLAN:   Procedure:  EGD and colonoscopy

## 2022-10-19 NOTE — PROGRESS NOTES
Patient procedure complete  Patient returned to [facility] 994; alert, oriented and able to sit upright in stretcher  Vitals WNL  Refreshments given  Awaiting discharge instructions  Family at bedside

## 2022-10-19 NOTE — ANESTHESIA PREPROCEDURE EVALUATION
Procedure:  COLONOSCOPY  EGD    Relevant Problems   GI/HEPATIC   (+) Gastroesophageal reflux disease   (+) Lower GI bleed        Physical Exam    Airway    Mallampati score: II  TM Distance: >3 FB  Neck ROM: full     Dental   No notable dental hx     Cardiovascular      Pulmonary      Other Findings        Anesthesia Plan  ASA Score- 2     Anesthesia Type- IV sedation with anesthesia with ASA Monitors  Additional Monitors:   Airway Plan:           Plan Factors-Exercise tolerance (METS): >4 METS  Chart reviewed  Existing labs reviewed  Patient summary reviewed  Patient is not a current smoker  Induction- intravenous  Postoperative Plan-     Informed Consent- Anesthetic plan and risks discussed with patient  I personally reviewed this patient with the CRNA  Discussed and agreed on the Anesthesia Plan with the CRNA  Kelby Conn

## 2022-10-25 ENCOUNTER — TELEPHONE (OUTPATIENT)
Dept: GASTROENTEROLOGY | Facility: CLINIC | Age: 47
End: 2022-10-25

## 2022-10-25 DIAGNOSIS — K22.5 ACQUIRED EPIPHRENIC DIVERTICULUM OF ESOPHAGUS: Primary | ICD-10-CM

## 2022-10-25 NOTE — TELEPHONE ENCOUNTER
Called patient left message that referral was placed in chart and gave number to central scheduling to make appointment

## 2022-10-25 NOTE — TELEPHONE ENCOUNTER
Patients GI provider:  Dr Van Norman    Number to return call: [phone]    Reason for call: Pt inquiring about referral for surgery from Dr Van Norman following his EGD    Scheduled procedure/appointment date if applicable: N/A

## 2022-10-31 ENCOUNTER — TELEPHONE (OUTPATIENT)
Dept: GASTROENTEROLOGY | Facility: CLINIC | Age: 47
End: 2022-10-31

## 2022-10-31 ENCOUNTER — TELEPHONE (OUTPATIENT)
Dept: CARDIAC SURGERY | Facility: CLINIC | Age: 47
End: 2022-10-31

## 2022-10-31 DIAGNOSIS — Q39.6 ESOPHAGEAL DIVERTICULUM: Primary | ICD-10-CM

## 2022-10-31 NOTE — TELEPHONE ENCOUNTER
Patients GI provider:  Dr Rory Archibald    Number to return call: [phone]    Reason for call: Hero Tracy from [place] [address] called and requested a script for a Barium Swallow        Scheduled procedure/appointment date if applicable: n/a

## 2022-11-22 ENCOUNTER — HOSPITAL ENCOUNTER (OUTPATIENT)
Dept: RADIOLOGY | Facility: HOSPITAL | Age: 47
Discharge: HOME | End: 2022-11-22

## 2022-11-22 DIAGNOSIS — Q39.6 ESOPHAGEAL DIVERTICULUM: ICD-10-CM

## 2022-11-29 ENCOUNTER — OFFICE VISIT (OUTPATIENT)
Dept: CARDIAC SURGERY | Facility: CLINIC | Age: 47
End: 2022-11-29

## 2022-11-29 VITALS
DIASTOLIC BLOOD PRESSURE: 84 MMHG | OXYGEN SATURATION: 98 % | WEIGHT: 285.94 LBS | RESPIRATION RATE: 17 BRPM | TEMPERATURE: 98.6 F | HEIGHT: 71 IN | SYSTOLIC BLOOD PRESSURE: 139 MMHG | HEART RATE: 77 BPM | BODY MASS INDEX: 40.03 KG/M2

## 2022-11-29 DIAGNOSIS — Q39.6 ESOPHAGEAL DIVERTICULUM: Primary | ICD-10-CM

## 2022-11-29 NOTE — ASSESSMENT & PLAN NOTE
We had a discussion with Jovon Parra after personally reviewing his imaging and medical history, which reveals an epiphrenic diaphragm  This is a progressive issue and should be repaired at some point  Dr Sonia Trevino is recommending esophageal manometry to further evaluate his esophageal function  We want to evaluate his esophageal motility  The surgery for repair would be a robotic assisted laparoscopic esophageal myotomy, with a partial fundoplication  This surgery was explained and all questions were answered  Dr Sonia Trevino would like to have him return to the office after the manometry to further evaluate his condition and make further recommendations  He is in agreement with the plan

## 2022-11-29 NOTE — PROGRESS NOTES
Thoracic Consult  Assessment/Plan:    Esophageal diverticulum  We had a discussion with Bob Mills after personally reviewing his imaging and medical history, which reveals an epiphrenic diaphragm  This is a progressive issue and should be repaired at some point  Dr Bernadette Wynn is recommending esophageal manometry to further evaluate his esophageal function  We want to evaluate his esophageal motility  The surgery for repair would be a robotic assisted laparoscopic esophageal myotomy, with a partial fundoplication  This surgery was explained and all questions were answered  Dr Bernadette Wynn would like to have him return to the office after the manometry to further evaluate his condition and make further recommendations  He is in agreement with the plan  Diagnoses and all orders for this visit:    Esophageal diverticulum  -     Esophageal manometry; Future             Thoracic History   Diagnosis: Esophageal diverticulum   Procedures/Surgeries:    Pathology:    Adjuvant Therapy:          Patient ID: Errol Lott is a 50 y o  male  ECOG 0     HPI      Mr Marianne Falcon is a 49 yo male with a history of prostate ca s/p resection in 2014, now on Lupron injections, diverticulosis, HTN, HLD and GERD who was referred by Dr Maurisio Alejandra to our office for a Zenker's diverticulum  Barium swallow from 11/22/22 revealed an epiphrenic diverticulum, arising from lateral aspect of distal esophagus  He first was made aware of this condition when he underwent an EGD on 10/19/22  This revealed a single large diverticulum in the GE junction  On discussion, he will wake up and feels like he has fluid that comes back up into his mouth after he lies down  This is occurring about 1x a week  No regurgitation of solids  He does have associated heartburn during the episode, but not on a normal basis  He has nausea and mid abdominal, which is chronic for him  He denies vomiting, early satiety, sore throat, or hoarseness   He does feel like his pill got stuck this morning  He has had intentional weight loss of about 10 lbs, through diet and exercise  He is "juicing a lot" as well, which has caused a change in his BM's  He thinks he had abdominal surgery when he was a child, but went in through the mouth  No abdominal surgery with incisions  He is not on any blood thinners         The following portions of the patient's history were reviewed and updated as appropriate: allergies, current medications, past family history, past medical history, past social history, past surgical history and problem list     Past Medical History:   Diagnosis Date   • Asthma    • Blood in the stool    • Cancer [facility]) 2014    prostate   • Colon polyp    • Diverticulosis    • GERD (gastroesophageal reflux disease)    • History of psychiatric treatment    • History of therapeutic radiation 2015    x 36 rounds   • Hypertension    • Wears glasses       Past Surgical History:   Procedure Laterality Date   • COLONOSCOPY     • PROSTATECTOMY  2014   • UPPER GASTROINTESTINAL ENDOSCOPY     • US GUIDED FINE NEEDLE ASPIRATION (ALL INC)  12/16/2014      Family History   Problem Relation Age of Onset   • Cancer Other    • Hypertension Other    • Hyperlipidemia Other       Social History     Socioeconomic History   • Marital status: /Civil Union     Spouse name: Not on file   • Number of children: Not on file   • Years of education: Not on file   • Highest education level: Not on file   Occupational History   • Not on file   Tobacco Use   • Smoking status: Never   • Smokeless tobacco: Never   Vaping Use   • Vaping Use: Never used   Substance and Sexual Activity   • Alcohol use: Not Currently   • Drug use: Never   • Sexual activity: Not on file   Other Topics Concern   • Not on file   Social History Narrative   • Not on file     Social Determinants of Health     Financial Resource Strain: Not on file   Food Insecurity: Not on file   Transportation Needs: Not on file   Physical Activity: Not on file   Stress: Not on file   Social Connections: Not on file   Intimate Partner Violence: Not on file   Housing Stability: Not on file        Allergies   Allergen Reactions   • Amoxicillin Diarrhea     Current Outpatient Medications on File Prior to Visit   Medication Sig Dispense Refill   • albuterol (PROVENTIL HFA,VENTOLIN HFA) 90 mcg/act inhaler INHALE 2 PUFFS BY MOUTH EVERY 6 HOURS AS NEEDED FOR WHEEZE     • amLODIPine (NORVASC) 10 mg tablet Take 10 mg by mouth every morning     • atorvastatin (LIPITOR) 20 mg tablet Take 20 mg by mouth daily at bedtime     • celecoxib (CeleBREX) 100 mg capsule Take 1 capsule by mouth 2 (two) times a day     • divalproex sodium (DEPAKOTE ER) 500 mg 24 hr tablet 2 tab po qam and 2 tabs po qhs     • leuprolide (LUPRON DEPOT 3 MONTH KIT) 22 5 mg injection Inject 22 5 mg into a muscle     • lisinopril (ZESTRIL) 40 mg tablet Take 40 mg by mouth every morning     • methylphenidate (RITALIN) 5 mg tablet if needed     • prazosin (MINIPRESS) 1 mg capsule 1 tab po qhs x 1 week then 2 tabs qhs     • prochlorperazine (COMPAZINE) 10 mg tablet TAKE 1 TABLET EVERY 6 HOURS X 30 DAYS AS NEEDED FOR NAUSEA AND VOMITING     • Sod Picosulfate-Mag Ox-Cit Acd (Clenpiq) 10-3 5-12 MG-GM -GM/160ML SOLN Follow instructions as given during the office  320 mL 0   • traMADol (ULTRAM) 50 mg tablet every 8 (eight) hours as needed     • Gemtesa 75 MG TABS Take 1 tablet by mouth daily       No current facility-administered medications on file prior to visit  Review of Systems   Constitutional: Negative for activity change, appetite change, chills, fatigue, fever and unexpected weight change  HENT: Negative for postnasal drip, sore throat, trouble swallowing and voice change  Eyes: Negative for visual disturbance  Respiratory: Negative for cough, chest tightness, shortness of breath and wheezing  Cardiovascular: Negative for chest pain and leg swelling     Gastrointestinal: Positive for abdominal pain, diarrhea (sometimes  ) and nausea  Negative for vomiting  Musculoskeletal: Negative for arthralgias and myalgias  Skin: Negative for pallor  Neurological: Negative for dizziness, light-headedness and headaches  Psychiatric/Behavioral: Negative for agitation, behavioral problems and confusion  The patient is not nervous/anxious  All other systems reviewed and are negative  Objective:   Physical Exam  Vitals reviewed  Constitutional:       General: He is not in acute distress  Appearance: Normal appearance  He is well-developed  He is not diaphoretic  HENT:      Head:      Comments: Hat on   Eyes:      General: No scleral icterus  Extraocular Movements: Extraocular movements intact  Neck:      Trachea: No tracheal deviation  Cardiovascular:      Rate and Rhythm: Normal rate and regular rhythm  Pulses: Normal pulses  Heart sounds: Normal heart sounds  No murmur heard  Pulmonary:      Effort: Pulmonary effort is normal  No respiratory distress  Breath sounds: Normal breath sounds  No wheezing  Abdominal:      General: Bowel sounds are normal  There is no distension  Palpations: Abdomen is soft  Musculoskeletal:         General: Normal range of motion  Cervical back: Normal range of motion and neck supple  Right lower leg: No edema  Left lower leg: No edema  Lymphadenopathy:      Cervical: No cervical adenopathy  Skin:     General: Skin is warm and dry  Neurological:      Mental Status: He is alert and oriented to person, place, and time  Cranial Nerves: No cranial nerve deficit  Psychiatric:         Mood and Affect: Mood normal          Behavior: Behavior normal          Thought Content:  Thought content normal      /84 (BP Location: Right arm, Patient Position: Sitting, Cuff Size: Large)   Pulse 77   Temp 98 6 °F (37 °C) (Temporal)   Resp 17   Ht 5' 11" (1 803 m)   Wt 130 kg (285 lb 15 oz)   SpO2 98% BMI 39 88 kg/m²   FL barium swallow ROUTINE esophagus    Result Date: 11/22/2022  Narrative BARIUM SWALLOW-ESOPHAGRAM INDICATION:   Q39 6: Congenital diverticulum of esophagus  COMPARISON:  CT abdomen/pelvis 8/24/2022  EGD 10/19/2022  IMAGES:  197  FLUOROSCOPY TIME:   0 8 minutes  TECHNIQUE: The patient was given effervescent granules and barium by mouth and images of the esophagus were obtained  FINDINGS: The esophagus is normal in caliber  Esophageal motility is normal and emptying of contrast from the esophagus is prompt  No mucosal lesion, ulceration, or evidence of fold thickening is seen  Arising from the right lateral aspect of the distal esophagus just above the hemidiaphragm, is a smooth rounded outpouching with the approximate size of 2 vertebral bodies and narrow communication to the esophagus, consistent with epiphrenic diverticulum  Contrast flows freely past the diverticulum into the stomach, and also flows freely into the diverticulum  There is retention of contrast within the diverticulum, with emptying dependent on patient positioning  Gastroesophageal reflux was not observed  Impression Epiphrenic diverticulum similar to prior CT examinations   Workstation performed: [id number]

## 2022-12-01 ENCOUNTER — TELEPHONE (OUTPATIENT)
Dept: GASTROENTEROLOGY | Facility: HOSPITAL | Age: 47
End: 2022-12-01

## 2022-12-01 ENCOUNTER — PREP FOR PROCEDURE (OUTPATIENT)
Dept: GASTROENTEROLOGY | Facility: CLINIC | Age: 47
End: 2022-12-01

## 2022-12-01 DIAGNOSIS — Q39.6 ESOPHAGEAL DIVERTICULUM: Primary | ICD-10-CM

## 2022-12-12 ENCOUNTER — TELEPHONE (OUTPATIENT)
Dept: GASTROENTEROLOGY | Facility: HOSPITAL | Age: 47
End: 2022-12-12

## 2022-12-13 ENCOUNTER — ANESTHESIA (OUTPATIENT)
Dept: GASTROENTEROLOGY | Facility: HOSPITAL | Age: 47
End: 2022-12-13

## 2022-12-13 ENCOUNTER — HOSPITAL ENCOUNTER (OUTPATIENT)
Dept: GASTROENTEROLOGY | Facility: HOSPITAL | Age: 47
Discharge: HOME | End: 2022-12-13

## 2022-12-13 ENCOUNTER — ANESTHESIA EVENT (OUTPATIENT)
Dept: GASTROENTEROLOGY | Facility: HOSPITAL | Age: 47
End: 2022-12-13

## 2022-12-13 ENCOUNTER — HOSPITAL ENCOUNTER (OUTPATIENT)
Dept: GASTROENTEROLOGY | Facility: HOSPITAL | Age: 47
Setting detail: OUTPATIENT SURGERY
Discharge: HOME | End: 2022-12-13
Attending: INTERNAL MEDICINE

## 2022-12-13 VITALS
WEIGHT: 272 LBS | HEIGHT: 71 IN | RESPIRATION RATE: 16 BRPM | TEMPERATURE: 97.7 F | SYSTOLIC BLOOD PRESSURE: 132 MMHG | HEART RATE: 59 BPM | BODY MASS INDEX: 38.08 KG/M2 | OXYGEN SATURATION: 98 % | DIASTOLIC BLOOD PRESSURE: 73 MMHG

## 2022-12-13 VITALS
TEMPERATURE: 87.8 F | SYSTOLIC BLOOD PRESSURE: 131 MMHG | HEART RATE: 78 BPM | OXYGEN SATURATION: 98 % | DIASTOLIC BLOOD PRESSURE: 78 MMHG | RESPIRATION RATE: 21 BRPM

## 2022-12-13 DIAGNOSIS — Q39.6 ESOPHAGEAL DIVERTICULUM: ICD-10-CM

## 2022-12-13 RX ORDER — PROPOFOL 10 MG/ML
INJECTION, EMULSION INTRAVENOUS CONTINUOUS PRN
Status: DISCONTINUED | OUTPATIENT
Start: 2022-12-13 | End: 2022-12-13

## 2022-12-13 RX ORDER — LIDOCAINE HYDROCHLORIDE 10 MG/ML
INJECTION, SOLUTION EPIDURAL; INFILTRATION; INTRACAUDAL; PERINEURAL AS NEEDED
Status: DISCONTINUED | OUTPATIENT
Start: 2022-12-13 | End: 2022-12-13

## 2022-12-13 RX ORDER — PROPOFOL 10 MG/ML
INJECTION, EMULSION INTRAVENOUS AS NEEDED
Status: DISCONTINUED | OUTPATIENT
Start: 2022-12-13 | End: 2022-12-13

## 2022-12-13 RX ADMIN — PROPOFOL 150 MCG/KG/MIN: 10 INJECTION, EMULSION INTRAVENOUS at 11:10

## 2022-12-13 RX ADMIN — PROPOFOL 100 MG: 10 INJECTION, EMULSION INTRAVENOUS at 11:10

## 2022-12-13 RX ADMIN — LIDOCAINE HYDROCHLORIDE 50 MG: 10 INJECTION, SOLUTION EPIDURAL; INFILTRATION; INTRACAUDAL; PERINEURAL at 11:10

## 2022-12-13 NOTE — ANESTHESIA PREPROCEDURE EVALUATION
Procedure:  EGD    Relevant Problems   GI/HEPATIC   (+) Gastroesophageal reflux disease   (+) Lower GI bleed      HTN  GERD  H/o prostate CA - currently under treatment with radiation  Asthma - primarily has symptoms with URIs  Physical Exam    Airway    Mallampati score: III  TM Distance: <3 FB  Neck ROM: full     Dental   Comment: None loose,     Cardiovascular      Pulmonary      Other Findings        Anesthesia Plan  ASA Score- 3     Anesthesia Type- IV sedation with anesthesia with ASA Monitors  Additional Monitors:   Airway Plan:     Comment: NPO after MN    Used albuterol this morning as a preventative measure    Counseled on possibility of recall and conversion to GA in the event of an emergency   Plan Factors-Exercise tolerance (METS): >4 METS  Chart reviewed  Patient summary reviewed  Patient is not a current smoker  Induction- intravenous  Postoperative Plan-     Informed Consent- Anesthetic plan and risks discussed with patient  I personally reviewed this patient with the CRNA  Discussed and agreed on the Anesthesia Plan with the CRNA  Param Butt

## 2022-12-13 NOTE — PERIOPERATIVE NURSING NOTE
Patient brought in the room and educated on procedure  Patient for EGD  guided manometry procedure  Please see the EGD procedure note  A transnasal insertion of the High Resolution esophageal manometry catheter was inserted via left nostril  Bands of both Upper esophageal sphincter  (UES) and Lower esophageal sphincter ( LES) were observed on the color contour  Catheter was secured to left cheek  A 30 second baseline resting pressure was obtained to identify the UES and LES   Patient recovered and allow time to awaken and once patient able to swallow patient instructed to swallow a series of 10 liquid swallows using 5 cc room temperature normal saline to assess esophageal motility and bolus transit  1 multiple rapid drink swallow using 2 cc room temperature normal saline given a total of 5 drinks  Patient was repositioned to the upright position on the stretcher and given 5 upright liquid swallows using 5 cc room temperature normal saline and 1 rapid drink challenge using 200 cc room temperature water  At the end of the procedure the high resolution esophageal manometry catheter was removed from the nostril intact  Patient transported to the recovery area via stretcher and report given to recovery nurse  Discharge instructions to be given to patient when patient meets the criteria to be discharge  Please see the EGD visit for time of departure from the unit

## 2022-12-13 NOTE — H&P
History and Physical - [facility]  Levon Perez 50 y o  male MRN: [id number]                  HPI: Levon Perez is a 50y o  year old male who presents for diverticulum, manometry placement  REVIEW OF SYSTEMS: Per the HPI, and otherwise unremarkable  Historical Information   Past Medical History:   Diagnosis Date   • Asthma    • Blood in the stool    • Cancer [facility]) 2014    prostate   • Colon polyp    • Diverticulosis    • GERD (gastroesophageal reflux disease)    • History of psychiatric treatment    • History of therapeutic radiation 2015    x 36 rounds   • Hypertension    • Wears glasses      Past Surgical History:   Procedure Laterality Date   • COLONOSCOPY     • PROSTATECTOMY  2014   • UPPER GASTROINTESTINAL ENDOSCOPY     • US GUIDED FINE NEEDLE ASPIRATION (ALL INC)  12/16/2014     Social History   Social History     Substance and Sexual Activity   Alcohol Use Not Currently     Social History     Substance and Sexual Activity   Drug Use Never     Social History     Tobacco Use   Smoking Status Never   Smokeless Tobacco Never     Family History   Problem Relation Age of Onset   • Cancer Other    • Hypertension Other    • Hyperlipidemia Other        Meds/Allergies     (Not in a hospital admission)      Allergies   Allergen Reactions   • Amoxicillin Diarrhea       Objective     There were no vitals taken for this visit  PHYSICAL EXAMINATION:    General Appearance:   Alert, cooperative, no distress   HEENT:  Normocephalic, atraumatic, anicteric  Neck supple, symmetrical, trachea midline  Lungs:   Equal chest rise and unlabored breathing, normal effort, no coughing  Cardiovascular:   No visualized JVD  Abdomen:   No abdominal distension  Skin:   No jaundice, rashes, or lesions  Musculoskeletal:   Normal range of motion visualized  Psych:  Normal affect and normal insight  Neuro:  Alert and appropriate             ASSESSMENT/PLAN:  This is a 50 y o  year old male here for EGD and manometry, and he is stable and optimized for his procedure

## 2022-12-13 NOTE — ANESTHESIA POSTPROCEDURE EVALUATION
Post-Op Assessment Note    CV Status:  Stable  Pain Score: 0    Pain management: adequate     Mental Status:  Alert and awake   Hydration Status:  Euvolemic and stable   PONV Controlled:  Controlled   Airway Patency:  Patent   Two or more mitigation strategies used for obstructive sleep apnea   Post Op Vitals Reviewed: Yes      Staff: CRNA         No notable events documented      BP   112/7   Temp   97 4   Pulse  87   Resp   14   SpO2   100

## 2022-12-20 ENCOUNTER — OFFICE VISIT (OUTPATIENT)
Dept: CARDIAC SURGERY | Facility: CLINIC | Age: 47
End: 2022-12-20

## 2022-12-20 VITALS
OXYGEN SATURATION: 97 % | TEMPERATURE: 98.6 F | WEIGHT: 285.72 LBS | HEART RATE: 66 BPM | RESPIRATION RATE: 17 BRPM | BODY MASS INDEX: 40 KG/M2 | HEIGHT: 71 IN | DIASTOLIC BLOOD PRESSURE: 84 MMHG | SYSTOLIC BLOOD PRESSURE: 127 MMHG

## 2022-12-20 DIAGNOSIS — Q39.6 ESOPHAGEAL DIVERTICULUM: Primary | ICD-10-CM

## 2022-12-20 RX ORDER — METRONIDAZOLE 500 MG/100ML
500 INJECTION, SOLUTION INTRAVENOUS ONCE
OUTPATIENT
Start: 2022-12-20 | End: 2022-12-20

## 2022-12-20 RX ORDER — CEFAZOLIN SODIUM 2 G/50ML
2000 SOLUTION INTRAVENOUS ONCE
OUTPATIENT
Start: 2022-12-20 | End: 2022-12-20

## 2022-12-20 NOTE — ASSESSMENT & PLAN NOTE
Mr Katarina Burch is seen in our office for an epiphrenic esophageal diverticulum  Dr Suresh Negrete explained that this is a progressive issue and non-urgent repair is recommended  His esophageal manometry results were reviewed and showed no contractility with normal IRP  However his barium swallow showed normal motility  Dr Suresh Negrete called Dr Anne Orlando to discuss these results and he will report back  At this point Dr Suresh Negrete is recommending robotic assisted laparoscopic esophageal myotomy with partial fundoplication  He explained the risks, benefits and alternatives and patient is in agreement  Consent was signed at this visit

## 2022-12-20 NOTE — PROGRESS NOTES
Thoracic Follow-Up  Assessment/Plan:    Esophageal diverticulum  Mr Kristy Moran is seen in our office for an epiphrenic esophageal diverticulum  Dr Carlos Cabral explained that this is a progressive issue and non-urgent repair is recommended  His esophageal manometry results were reviewed and showed no contractility with normal IRP  However his barium swallow showed normal motility  Dr Carlos Cabral called Dr Chencho Torres to discuss these results and he will report back  At this point Dr Carlos Cabral is recommending robotic assisted laparoscopic esophageal myotomy with partial fundoplication  He explained the risks, benefits and alternatives and patient is in agreement  Consent was signed at this visit  Diagnoses and all orders for this visit:    Esophageal diverticulum  -     Case request operating room: [address] with partial fundoplication, ESOPHAGOGASTRODUODENOSCOPY (EGD); Standing  -     Comprehensive metabolic panel; Future  -     CBC and Platelet; Future  -     Type and screen; Future  -     APTT; Future  -     Protime-INR; Future  -     Case request operating room: [address] with partial fundoplication, ESOPHAGOGASTRODUODENOSCOPY (EGD)    Other orders  -     Diet NPO; Sips with meds; Standing  -     Void on call to OR; Standing  -     Insert peripheral IV; Standing  -     Place sequential compression device; Standing  -     Shower/scrub; Standing  -     ceFAZolin (ANCEF) IVPB (premix in dextrose) 2,000 mg 50 mL  -     metroNIDAZOLE (FLAGYL) IVPB (premix) 500 mg 100 mL          Thoracic History       Diagnosis: Esophageal diverticulum   Procedures/Surgeries:     Pathology:     Adjuvant Therapy:            Subjective:    Patient ID: Lord Abebe is a 50 y o  male  HPI   MR Kristy Moran is a 50year old male who presents to our office for evaluation of a Zenker's diverticulum   He underwent and EGD on 10/19/22 that revealed a single large diverticulum in the GE junction  Barium swallow on 11/22/22 which revealed an epiphrenic diverticulum arising from the lateral aspect of the distal esophagus  He completed esophageal manometry on 12/13/22 which showed 10 swallows with failed esophageal contractility pattern with mean DCI of 0 mmHg and 0% complete clearance of liquid bolus swallows with normal LES-median IRP  On discussion his symptoms are unchanged since his last visit  He is able to swallow food but requires large amounts of water to "get it down " He says this happens about every other day  He has no issues drinking liquids  He does have some regurgitation of fluid in the back of his mouth at night about once a week  The following portions of the patient's history were reviewed and updated as appropriate: allergies, current medications, past family history, past medical history, past social history, past surgical history and problem list     Review of Systems   Constitutional: Negative for chills, diaphoresis and unexpected weight change  HENT: Negative  Eyes: Negative  Respiratory: Negative for cough, shortness of breath and wheezing  Cardiovascular: Negative for chest pain and leg swelling  Gastrointestinal: Negative for abdominal pain, diarrhea and nausea  Food regurgitation  Dysphagia    Endocrine: Negative  Musculoskeletal: Negative  Neurological: Negative  Hematological: Negative for adenopathy  Does not bruise/bleed easily  All other systems reviewed and are negative  Objective:   Physical Exam  Vitals reviewed  Constitutional:       General: He is not in acute distress  Appearance: Normal appearance  He is not ill-appearing  HENT:      Head: Normocephalic  Nose: Nose normal       Mouth/Throat:      Mouth: Mucous membranes are moist    Eyes:      Pupils: Pupils are equal, round, and reactive to light  Cardiovascular:      Rate and Rhythm: Normal rate and regular rhythm        Heart sounds: Normal heart sounds  Pulmonary:      Effort: Pulmonary effort is normal       Breath sounds: Normal breath sounds  No wheezing, rhonchi or rales  Abdominal:      Palpations: Abdomen is soft  Musculoskeletal:         General: No swelling  Normal range of motion  Skin:     General: Skin is warm  Neurological:      General: No focal deficit present  Mental Status: He is alert and oriented to person, place, and time  Psychiatric:         Mood and Affect: Mood normal      /84 (BP Location: Right arm, Patient Position: Sitting, Cuff Size: Large) Comment (BP Location): Right forearm  Pulse 66   Temp 98 6 °F (37 °C) (Temporal)   Resp 17   Ht 5' 11" (1 803 m)   Wt 130 kg (285 lb 11 5 oz)   SpO2 97%   BMI 39 85 kg/m²     t  FL barium swallow ROUTINE esophagus    Result Date: 11/22/2022  Narrative BARIUM SWALLOW-ESOPHAGRAM INDICATION:   Q39 6: Congenital diverticulum of esophagus  COMPARISON:  CT abdomen/pelvis 8/24/2022  EGD 10/19/2022  IMAGES:  197  FLUOROSCOPY TIME:   0 8 minutes  TECHNIQUE: The patient was given effervescent granules and barium by mouth and images of the esophagus were obtained  FINDINGS: The esophagus is normal in caliber  Esophageal motility is normal and emptying of contrast from the esophagus is prompt  No mucosal lesion, ulceration, or evidence of fold thickening is seen  Arising from the right lateral aspect of the distal esophagus just above the hemidiaphragm, is a smooth rounded outpouching with the approximate size of 2 vertebral bodies and narrow communication to the esophagus, consistent with epiphrenic diverticulum  Contrast flows freely past the diverticulum into the stomach, and also flows freely into the diverticulum  There is retention of contrast within the diverticulum, with emptying dependent on patient positioning  Gastroesophageal reflux was not observed  Impression Epiphrenic diverticulum similar to prior CT examinations   Workstation performed: [id number]

## 2023-01-04 ENCOUNTER — TELEPHONE (OUTPATIENT)
Dept: CARDIAC SURGERY | Facility: CLINIC | Age: 48
End: 2023-01-04

## 2023-01-04 NOTE — TELEPHONE ENCOUNTER
Pt's wife called in asking for the name of the procedure Rakesh Garcia is having done with Dr Octavia Kovacs so that they can do some research  I returned her call and LVM with the name of the procedure

## 2023-01-10 ENCOUNTER — LAB REQUISITION (OUTPATIENT)
Dept: LAB | Facility: HOSPITAL | Age: 48
End: 2023-01-10

## 2023-01-10 ENCOUNTER — APPOINTMENT (OUTPATIENT)
Dept: LAB | Facility: CLINIC | Age: 48
End: 2023-01-10

## 2023-01-10 DIAGNOSIS — Q39.6 ESOPHAGEAL DIVERTICULUM: ICD-10-CM

## 2023-01-10 DIAGNOSIS — Q39.6 CONGENITAL DIVERTICULUM OF ESOPHAGUS: ICD-10-CM

## 2023-01-10 LAB
ABO GROUP BLD: NORMAL
ABO GROUP BLD: NORMAL
ALBUMIN SERPL BCP-MCNC: 4.3 G/DL (ref 3.5–5)
ALP SERPL-CCNC: 57 U/L (ref 34–104)
ALT SERPL W P-5'-P-CCNC: 18 U/L (ref 7–52)
ANION GAP SERPL CALCULATED.3IONS-SCNC: 6 MMOL/L (ref 4–13)
APTT PPP: 28 SECONDS (ref 23–37)
AST SERPL W P-5'-P-CCNC: 22 U/L (ref 13–39)
BILIRUB SERPL-MCNC: 0.62 MG/DL (ref 0.2–1)
BLD GP AB SCN SERPL QL: NEGATIVE
BUN SERPL-MCNC: 15 MG/DL (ref 5–25)
CALCIUM SERPL-MCNC: 9.5 MG/DL (ref 8.4–10.2)
CHLORIDE SERPL-SCNC: 108 MMOL/L (ref 96–108)
CO2 SERPL-SCNC: 28 MMOL/L (ref 21–32)
CREAT SERPL-MCNC: 1.05 MG/DL (ref 0.6–1.3)
ERYTHROCYTE [DISTWIDTH] IN BLOOD BY AUTOMATED COUNT: 16.2 % (ref 11.6–15.1)
GFR SERPL CREATININE-BSD FRML MDRD: 82 ML/MIN/1.73SQ M
GLUCOSE P FAST SERPL-MCNC: 91 MG/DL (ref 65–99)
HCT VFR BLD AUTO: 42.1 % (ref 36.5–49.3)
HGB BLD-MCNC: 13 G/DL (ref 12–17)
INR PPP: 0.99 (ref 0.84–1.19)
MCH RBC QN AUTO: 24.9 PG (ref 26.8–34.3)
MCHC RBC AUTO-ENTMCNC: 30.9 G/DL (ref 31.4–37.4)
MCV RBC AUTO: 81 FL (ref 82–98)
PLATELET # BLD AUTO: 350 THOUSANDS/UL (ref 149–390)
PMV BLD AUTO: 10.6 FL (ref 8.9–12.7)
POTASSIUM SERPL-SCNC: 4.7 MMOL/L (ref 3.5–5.3)
PROT SERPL-MCNC: 7.4 G/DL (ref 6.4–8.4)
PROTHROMBIN TIME: 13.3 SECONDS (ref 11.6–14.5)
RBC # BLD AUTO: 5.23 MILLION/UL (ref 3.88–5.62)
RH BLD: POSITIVE
RH BLD: POSITIVE
SODIUM SERPL-SCNC: 142 MMOL/L (ref 135–147)
SPECIMEN EXPIRATION DATE: NORMAL
WBC # BLD AUTO: 5.41 THOUSAND/UL (ref 4.31–10.16)

## 2023-01-11 RX ORDER — CHOLECALCIFEROL (VITAMIN D3) 125 MCG
CAPSULE ORAL
COMMUNITY
End: 2023-02-04

## 2023-01-11 RX ORDER — ACETAMINOPHEN 500 MG
500 TABLET ORAL EVERY 6 HOURS PRN
COMMUNITY
End: 2023-02-04

## 2023-01-11 NOTE — PRE-PROCEDURE INSTRUCTIONS
Pre-Surgery Instructions:   Medication Instructions   • acetaminophen (TYLENOL) 500 mg tablet Uses PRN- OK to take day of surgery   • albuterol (PROVENTIL HFA,VENTOLIN HFA) 90 mcg/act inhaler Uses PRN- OK to take day of surgery   • amLODIPine (NORVASC) 10 mg tablet Take day of surgery  • atorvastatin (LIPITOR) 20 mg tablet Take night before surgery   • Cholecalciferol (Vitamin D) 125 MCG (5000 UT) CAPS Stop taking 7 days prior to surgery  • divalproex sodium (DEPAKOTE ER) 500 mg 24 hr tablet Take day of surgery  • leuprolide (LUPRON DEPOT 3 MONTH KIT) 22 5 mg injection Per pt receives every 3 months next dose due end of 2/2023   • lisinopril (ZESTRIL) 40 mg tablet Hold day of surgery  • methylphenidate (RITALIN) 5 mg tablet Uses PRN- DO NOT take day of surgery   • prazosin (MINIPRESS) 1 mg capsule Take night before surgery   • prochlorperazine (COMPAZINE) 10 mg tablet Uses PRN- OK to take day of surgery   • traMADol (ULTRAM) 50 mg tablet Uses PRN- OK to take day of surgery    Reviewed preoperative medication and showering instructions with patient-Pt verbalized understanding  Instructed pt to stop taking ASA /NSAIDS and non prescribed vitamins and herbal supplements 7 days before surgery or per Dr Yovany Leung  May take Tylenol if needed and Instructed to take DOS medications with small sip of water  Instructed NPO past midnight prior to surgery ---surgery department will call the day before surgery with arrival time for DOS  Instructed to notify surgeon and family doctor office with any changes in health prior to surgery      Patient (Pt ) verbalized understanding of all instructions

## 2023-01-30 ENCOUNTER — OFFICE VISIT (OUTPATIENT)
Dept: CARDIAC SURGERY | Facility: CLINIC | Age: 48
End: 2023-01-30

## 2023-01-30 VITALS
TEMPERATURE: 99.5 F | HEIGHT: 71 IN | SYSTOLIC BLOOD PRESSURE: 127 MMHG | WEIGHT: 282.85 LBS | OXYGEN SATURATION: 98 % | DIASTOLIC BLOOD PRESSURE: 78 MMHG | BODY MASS INDEX: 39.6 KG/M2 | HEART RATE: 75 BPM | RESPIRATION RATE: 17 BRPM

## 2023-01-30 DIAGNOSIS — Q39.6 ESOPHAGEAL DIVERTICULUM: Primary | ICD-10-CM

## 2023-01-30 NOTE — H&P (VIEW-ONLY)
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 who presents for an updated H&P prior to robotic resection of diverticulum, Heller myotomy and Jaskaran fundoplication 3/6/67  His symptoms include regurgitation, and needing water to flush food boluses secondary to dysphagia  HE feels like his symptoms are a little worse compared to the last time that he saw us  The following portions of the patient's history were reviewed and updated as appropriate: allergies, current medications, past family history, past medical history, past social history, past surgical history and problem list     Review of Systems   HENT: Positive for trouble swallowing  Musculoskeletal: Positive for arthralgias  All other systems reviewed and are negative  Objective:   Physical Exam  Vitals reviewed  Constitutional:       General: He is not in acute distress  Appearance: Normal appearance  He is well-developed  He is not diaphoretic  HENT:      Head: Normocephalic and atraumatic  Mouth/Throat:      Comments: Masked   Eyes:      General: No scleral icterus  Extraocular Movements: Extraocular movements intact  Neck:      Trachea: No tracheal deviation  Cardiovascular:      Rate and Rhythm: Normal rate and regular rhythm  Pulses: Normal pulses  Heart sounds: Normal heart sounds  No murmur heard    Pulmonary:      Effort: Pulmonary effort is normal  No respiratory distress  Breath sounds: Normal breath sounds  No wheezing  Abdominal:      General: Bowel sounds are normal  There is no distension  Palpations: Abdomen is soft  Musculoskeletal:         General: Normal range of motion  Cervical back: Normal range of motion and neck supple  Right lower leg: No edema  Left lower leg: No edema  Lymphadenopathy:      Cervical: No cervical adenopathy  Skin:     General: Skin is warm and dry  Findings: No erythema  Neurological:      Mental Status: He is alert and oriented to person, place, and time  Cranial Nerves: No cranial nerve deficit  Psychiatric:         Mood and Affect: Mood normal          Behavior: Behavior normal          Thought Content: Thought content normal      /78 (BP Location: Right arm, Patient Position: Sitting, Cuff Size: Large)   Pulse 75   Temp 99 5 °F (37 5 °C) (Temporal)   Resp 17   Ht 5' 11" (1 803 m)   Wt 128 kg (282 lb 13 6 oz)   SpO2 98%   BMI 39 45 kg/m²       FL barium swallow ROUTINE esophagus    Result Date: 11/22/2022  Narrative BARIUM SWALLOW-ESOPHAGRAM INDICATION:   Q39 6: Congenital diverticulum of esophagus  COMPARISON:  CT abdomen/pelvis 8/24/2022  EGD 10/19/2022  IMAGES:  197  FLUOROSCOPY TIME:   0 8 minutes  TECHNIQUE: The patient was given effervescent granules and barium by mouth and images of the esophagus were obtained  FINDINGS: The esophagus is normal in caliber  Esophageal motility is normal and emptying of contrast from the esophagus is prompt  No mucosal lesion, ulceration, or evidence of fold thickening is seen  Arising from the right lateral aspect of the distal esophagus just above the hemidiaphragm, is a smooth rounded outpouching with the approximate size of 2 vertebral bodies and narrow communication to the esophagus, consistent with epiphrenic diverticulum   Contrast flows freely past the diverticulum into the stomach, and also flows freely into the diverticulum  There is retention of contrast within the diverticulum, with emptying dependent on patient positioning  Gastroesophageal reflux was not observed  Impression Epiphrenic diverticulum similar to prior CT examinations   Workstation performed: [id number]

## 2023-01-30 NOTE — PROGRESS NOTES
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 who presents for an updated H&P prior to robotic resection of diverticulum, Heller myotomy and Jaskaran fundoplication 7/2/43  His symptoms include regurgitation, and needing water to flush food boluses secondary to dysphagia  HE feels like his symptoms are a little worse compared to the last time that he saw us  The following portions of the patient's history were reviewed and updated as appropriate: allergies, current medications, past family history, past medical history, past social history, past surgical history and problem list     Review of Systems   HENT: Positive for trouble swallowing  Musculoskeletal: Positive for arthralgias  All other systems reviewed and are negative  Objective:   Physical Exam  Vitals reviewed  Constitutional:       General: He is not in acute distress  Appearance: Normal appearance  He is well-developed  He is not diaphoretic  HENT:      Head: Normocephalic and atraumatic  Mouth/Throat:      Comments: Masked   Eyes:      General: No scleral icterus  Extraocular Movements: Extraocular movements intact  Neck:      Trachea: No tracheal deviation  Cardiovascular:      Rate and Rhythm: Normal rate and regular rhythm  Pulses: Normal pulses  Heart sounds: Normal heart sounds  No murmur heard    Pulmonary:      Effort: Pulmonary effort is normal  No respiratory distress  Breath sounds: Normal breath sounds  No wheezing  Abdominal:      General: Bowel sounds are normal  There is no distension  Palpations: Abdomen is soft  Musculoskeletal:         General: Normal range of motion  Cervical back: Normal range of motion and neck supple  Right lower leg: No edema  Left lower leg: No edema  Lymphadenopathy:      Cervical: No cervical adenopathy  Skin:     General: Skin is warm and dry  Findings: No erythema  Neurological:      Mental Status: He is alert and oriented to person, place, and time  Cranial Nerves: No cranial nerve deficit  Psychiatric:         Mood and Affect: Mood normal          Behavior: Behavior normal          Thought Content: Thought content normal      /78 (BP Location: Right arm, Patient Position: Sitting, Cuff Size: Large)   Pulse 75   Temp 99 5 °F (37 5 °C) (Temporal)   Resp 17   Ht 5' 11" (1 803 m)   Wt 128 kg (282 lb 13 6 oz)   SpO2 98%   BMI 39 45 kg/m²       FL barium swallow ROUTINE esophagus    Result Date: 11/22/2022  Narrative BARIUM SWALLOW-ESOPHAGRAM INDICATION:   Q39 6: Congenital diverticulum of esophagus  COMPARISON:  CT abdomen/pelvis 8/24/2022  EGD 10/19/2022  IMAGES:  197  FLUOROSCOPY TIME:   0 8 minutes  TECHNIQUE: The patient was given effervescent granules and barium by mouth and images of the esophagus were obtained  FINDINGS: The esophagus is normal in caliber  Esophageal motility is normal and emptying of contrast from the esophagus is prompt  No mucosal lesion, ulceration, or evidence of fold thickening is seen  Arising from the right lateral aspect of the distal esophagus just above the hemidiaphragm, is a smooth rounded outpouching with the approximate size of 2 vertebral bodies and narrow communication to the esophagus, consistent with epiphrenic diverticulum   Contrast flows freely past the diverticulum into the stomach, and also flows freely into the diverticulum  There is retention of contrast within the diverticulum, with emptying dependent on patient positioning  Gastroesophageal reflux was not observed  Impression Epiphrenic diverticulum similar to prior CT examinations   Workstation performed: [id number]

## 2023-01-30 NOTE — ASSESSMENT & PLAN NOTE
Mr Gloria Perdomo presents for an updated H&P prior to robotic epiphrenic diverticulum resection, Heller Myotomy and Jaskaran fundoplication 6/4/63  He has slight worsening of his dysphagia since his last visit  All of his questions were addressed about surgery and the post-operative period  His labs are completed

## 2023-02-02 ENCOUNTER — ANESTHESIA EVENT (INPATIENT)
Dept: PERIOP | Facility: HOSPITAL | Age: 48
End: 2023-02-02

## 2023-02-02 PROBLEM — E66.9 OBESITY (BMI 30-39.9): Status: ACTIVE | Noted: 2023-02-02

## 2023-02-02 PROBLEM — J45.909 ASTHMA: Status: ACTIVE | Noted: 2023-02-02

## 2023-02-02 PROBLEM — F32.A DEPRESSION: Status: ACTIVE | Noted: 2023-02-02

## 2023-02-02 PROBLEM — I10 HTN (HYPERTENSION): Status: ACTIVE | Noted: 2023-02-02

## 2023-02-02 NOTE — ANESTHESIA PREPROCEDURE EVALUATION
Procedure:  robotic assisted laparoscopic esophageal myotomy w/ partial fundoplication (Abdomen)  ESOPHAGOGASTRODUODENOSCOPY (EGD) (Esophagus)    Relevant Problems   ANESTHESIA (within normal limits)      CARDIO   (+) HTN (hypertension)      GI/HEPATIC   (+) Gastroesophageal reflux disease      NEURO/PSYCH   (+) Chronic pain after cancer treatment   (+) Depression      PULMONARY   (+) Asthma      Digestive   (+) Diverticulosis      Other   (+) Esophageal diverticulum   (+) Obesity (BMI 30-39  9)        Physical Exam    Airway    Mallampati score: II  TM Distance: >3 FB  Neck ROM: full     Dental   No notable dental hx     Cardiovascular  Rhythm: regular, Rate: normal, Cardiovascular exam normal    Pulmonary  Pulmonary exam normal Breath sounds clear to auscultation,     Other Findings        Anesthesia Plan  ASA Score- 3     Anesthesia Type- general with ASA Monitors  Additional Monitors: arterial line  Airway Plan: ETT  Plan Factors-Exercise tolerance (METS): >4 METS  Chart reviewed  EKG reviewed  Existing labs reviewed  Patient summary reviewed  Patient is not a current smoker  Induction- intravenous  Postoperative Plan- Plan for postoperative opioid use  Planned trial extubation    Informed Consent- Anesthetic plan and risks discussed with patient  I personally reviewed this patient with the CRNA  Discussed and agreed on the Anesthesia Plan with the CRNA  Nunu Deluna

## 2023-02-03 ENCOUNTER — APPOINTMENT (OUTPATIENT)
Dept: RADIOLOGY | Facility: HOSPITAL | Age: 48
End: 2023-02-03

## 2023-02-03 ENCOUNTER — ANESTHESIA (INPATIENT)
Dept: PERIOP | Facility: HOSPITAL | Age: 48
End: 2023-02-03

## 2023-02-03 ENCOUNTER — HOSPITAL ENCOUNTER (INPATIENT)
Facility: HOSPITAL | Age: 48
LOS: 1 days | Discharge: HOME | End: 2023-02-04
Attending: THORACIC SURGERY (CARDIOTHORACIC VASCULAR SURGERY) | Admitting: THORACIC SURGERY (CARDIOTHORACIC VASCULAR SURGERY)

## 2023-02-03 DIAGNOSIS — Q39.6 ESOPHAGEAL DIVERTICULUM: ICD-10-CM

## 2023-02-03 PROBLEM — G89.3 CHRONIC PAIN AFTER CANCER TREATMENT: Status: ACTIVE | Noted: 2023-02-03

## 2023-02-03 PROCEDURE — 0DV44ZZ RESTRICTION OF ESOPHAGOGASTRIC JUNCTION, PERCUTANEOUS ENDOSCOPIC APPROACH: ICD-10-PCS | Performed by: THORACIC SURGERY (CARDIOTHORACIC VASCULAR SURGERY)

## 2023-02-03 PROCEDURE — 0D844ZZ DIVISION OF ESOPHAGOGASTRIC JUNCTION, PERCUTANEOUS ENDOSCOPIC APPROACH: ICD-10-PCS | Performed by: THORACIC SURGERY (CARDIOTHORACIC VASCULAR SURGERY)

## 2023-02-03 PROCEDURE — 0DJ08ZZ INSPECTION OF UPPER INTESTINAL TRACT, VIA NATURAL OR ARTIFICIAL OPENING ENDOSCOPIC: ICD-10-PCS | Performed by: THORACIC SURGERY (CARDIOTHORACIC VASCULAR SURGERY)

## 2023-02-03 PROCEDURE — 8E0W4CZ ROBOTIC ASSISTED PROCEDURE OF TRUNK REGION, PERCUTANEOUS ENDOSCOPIC APPROACH: ICD-10-PCS | Performed by: THORACIC SURGERY (CARDIOTHORACIC VASCULAR SURGERY)

## 2023-02-03 RX ORDER — FENTANYL CITRATE/PF 50 MCG/ML
25 SYRINGE (ML) INJECTION
Status: DISCONTINUED | OUTPATIENT
Start: 2023-02-03 | End: 2023-02-03

## 2023-02-03 RX ORDER — OXYCODONE HYDROCHLORIDE 5 MG/1
5 TABLET ORAL EVERY 4 HOURS PRN
Status: DISCONTINUED | OUTPATIENT
Start: 2023-02-03 | End: 2023-02-04 | Stop reason: HOSPADM

## 2023-02-03 RX ORDER — HYDROMORPHONE HCL/PF 1 MG/ML
0.5 SYRINGE (ML) INJECTION
Status: DISCONTINUED | OUTPATIENT
Start: 2023-02-03 | End: 2023-02-03

## 2023-02-03 RX ORDER — MAGNESIUM HYDROXIDE 1200 MG/15ML
LIQUID ORAL AS NEEDED
Status: DISCONTINUED | OUTPATIENT
Start: 2023-02-03 | End: 2023-02-03 | Stop reason: HOSPADM

## 2023-02-03 RX ORDER — ENOXAPARIN SODIUM 100 MG/ML
40 INJECTION SUBCUTANEOUS DAILY
Status: DISCONTINUED | OUTPATIENT
Start: 2023-02-04 | End: 2023-02-04 | Stop reason: HOSPADM

## 2023-02-03 RX ORDER — DEXAMETHASONE SODIUM PHOSPHATE 10 MG/ML
INJECTION, SOLUTION INTRAMUSCULAR; INTRAVENOUS AS NEEDED
Status: DISCONTINUED | OUTPATIENT
Start: 2023-02-03 | End: 2023-02-03

## 2023-02-03 RX ORDER — CEFAZOLIN SODIUM 1 G/3ML
INJECTION, POWDER, FOR SOLUTION INTRAMUSCULAR; INTRAVENOUS AS NEEDED
Status: DISCONTINUED | OUTPATIENT
Start: 2023-02-03 | End: 2023-02-03

## 2023-02-03 RX ORDER — LIDOCAINE HYDROCHLORIDE 10 MG/ML
INJECTION, SOLUTION EPIDURAL; INFILTRATION; INTRACAUDAL; PERINEURAL
Status: DISPENSED
Start: 2023-02-03 | End: 2023-02-03

## 2023-02-03 RX ORDER — METOCLOPRAMIDE HYDROCHLORIDE 5 MG/ML
10 INJECTION INTRAMUSCULAR; INTRAVENOUS ONCE AS NEEDED
Status: DISCONTINUED | OUTPATIENT
Start: 2023-02-03 | End: 2023-02-03

## 2023-02-03 RX ORDER — ONDANSETRON 2 MG/ML
4 INJECTION INTRAMUSCULAR; INTRAVENOUS ONCE AS NEEDED
Status: DISCONTINUED | OUTPATIENT
Start: 2023-02-03 | End: 2023-02-03

## 2023-02-03 RX ORDER — HYDROMORPHONE HCL IN WATER/PF 6 MG/30 ML
0.2 PATIENT CONTROLLED ANALGESIA SYRINGE INTRAVENOUS
Status: DISCONTINUED | OUTPATIENT
Start: 2023-02-03 | End: 2023-02-03

## 2023-02-03 RX ORDER — SODIUM CHLORIDE 9 MG/ML
INJECTION, SOLUTION INTRAVENOUS CONTINUOUS PRN
Status: DISCONTINUED | OUTPATIENT
Start: 2023-02-03 | End: 2023-02-03

## 2023-02-03 RX ORDER — SENNOSIDES 8.6 MG
1 TABLET ORAL DAILY
Status: DISCONTINUED | OUTPATIENT
Start: 2023-02-03 | End: 2023-02-04 | Stop reason: HOSPADM

## 2023-02-03 RX ORDER — ACETAMINOPHEN 160 MG/5ML
650 SUSPENSION, ORAL (FINAL DOSE FORM) ORAL EVERY 6 HOURS
Status: DISCONTINUED | OUTPATIENT
Start: 2023-02-03 | End: 2023-02-04 | Stop reason: HOSPADM

## 2023-02-03 RX ORDER — SODIUM CHLORIDE, SODIUM LACTATE, POTASSIUM CHLORIDE, CALCIUM CHLORIDE 600; 310; 30; 20 MG/100ML; MG/100ML; MG/100ML; MG/100ML
60 INJECTION, SOLUTION INTRAVENOUS CONTINUOUS
Status: DISCONTINUED | OUTPATIENT
Start: 2023-02-03 | End: 2023-02-04

## 2023-02-03 RX ORDER — OXYCODONE HYDROCHLORIDE 5 MG/1
10 TABLET ORAL EVERY 4 HOURS PRN
Status: DISCONTINUED | OUTPATIENT
Start: 2023-02-03 | End: 2023-02-04 | Stop reason: HOSPADM

## 2023-02-03 RX ORDER — METRONIDAZOLE 500 MG/100ML
500 INJECTION, SOLUTION INTRAVENOUS ONCE
Status: COMPLETED | OUTPATIENT
Start: 2023-02-03 | End: 2023-02-03

## 2023-02-03 RX ORDER — VASOPRESSIN 20 U/ML
INJECTION PARENTERAL AS NEEDED
Status: DISCONTINUED | OUTPATIENT
Start: 2023-02-03 | End: 2023-02-03

## 2023-02-03 RX ORDER — POLYETHYLENE GLYCOL 3350 17 G/17G
17 POWDER, FOR SOLUTION ORAL DAILY
Status: DISCONTINUED | OUTPATIENT
Start: 2023-02-04 | End: 2023-02-04 | Stop reason: HOSPADM

## 2023-02-03 RX ORDER — LISINOPRIL 20 MG/1
40 TABLET ORAL EVERY MORNING
Status: DISCONTINUED | OUTPATIENT
Start: 2023-02-04 | End: 2023-02-03

## 2023-02-03 RX ORDER — ALBUTEROL SULFATE 2.5 MG/3ML
2.5 SOLUTION RESPIRATORY (INHALATION) ONCE AS NEEDED
Status: DISCONTINUED | OUTPATIENT
Start: 2023-02-03 | End: 2023-02-03

## 2023-02-03 RX ORDER — PROPOFOL 10 MG/ML
INJECTION, EMULSION INTRAVENOUS AS NEEDED
Status: DISCONTINUED | OUTPATIENT
Start: 2023-02-03 | End: 2023-02-03

## 2023-02-03 RX ORDER — HYDROMORPHONE HCL/PF 1 MG/ML
0.5 SYRINGE (ML) INJECTION EVERY 2 HOUR PRN
Status: DISCONTINUED | OUTPATIENT
Start: 2023-02-03 | End: 2023-02-04 | Stop reason: HOSPADM

## 2023-02-03 RX ORDER — LIDOCAINE HYDROCHLORIDE 10 MG/ML
INJECTION, SOLUTION EPIDURAL; INFILTRATION; INTRACAUDAL; PERINEURAL AS NEEDED
Status: DISCONTINUED | OUTPATIENT
Start: 2023-02-03 | End: 2023-02-03

## 2023-02-03 RX ORDER — PRAZOSIN HYDROCHLORIDE 1 MG/1
1 CAPSULE ORAL
Status: DISCONTINUED | OUTPATIENT
Start: 2023-02-03 | End: 2023-02-04 | Stop reason: HOSPADM

## 2023-02-03 RX ORDER — CEFAZOLIN SODIUM 2 G/50ML
2000 SOLUTION INTRAVENOUS ONCE
Status: COMPLETED | OUTPATIENT
Start: 2023-02-03 | End: 2023-02-03

## 2023-02-03 RX ORDER — NEOSTIGMINE METHYLSULFATE 1 MG/ML
INJECTION INTRAVENOUS AS NEEDED
Status: DISCONTINUED | OUTPATIENT
Start: 2023-02-03 | End: 2023-02-03

## 2023-02-03 RX ORDER — ONDANSETRON 2 MG/ML
INJECTION INTRAMUSCULAR; INTRAVENOUS AS NEEDED
Status: DISCONTINUED | OUTPATIENT
Start: 2023-02-03 | End: 2023-02-03

## 2023-02-03 RX ORDER — ALBUTEROL SULFATE 90 UG/1
2 AEROSOL, METERED RESPIRATORY (INHALATION) EVERY 6 HOURS PRN
Status: DISCONTINUED | OUTPATIENT
Start: 2023-02-03 | End: 2023-02-04 | Stop reason: HOSPADM

## 2023-02-03 RX ORDER — HYDRALAZINE HYDROCHLORIDE 20 MG/ML
5 INJECTION INTRAMUSCULAR; INTRAVENOUS
Status: DISCONTINUED | OUTPATIENT
Start: 2023-02-03 | End: 2023-02-03

## 2023-02-03 RX ORDER — LABETALOL HYDROCHLORIDE 5 MG/ML
10 INJECTION, SOLUTION INTRAVENOUS
Status: DISCONTINUED | OUTPATIENT
Start: 2023-02-03 | End: 2023-02-03

## 2023-02-03 RX ORDER — ONDANSETRON 2 MG/ML
4 INJECTION INTRAMUSCULAR; INTRAVENOUS EVERY 6 HOURS PRN
Status: DISCONTINUED | OUTPATIENT
Start: 2023-02-03 | End: 2023-02-04 | Stop reason: HOSPADM

## 2023-02-03 RX ORDER — MIDAZOLAM HYDROCHLORIDE 2 MG/2ML
INJECTION, SOLUTION INTRAMUSCULAR; INTRAVENOUS AS NEEDED
Status: DISCONTINUED | OUTPATIENT
Start: 2023-02-03 | End: 2023-02-03

## 2023-02-03 RX ORDER — ALBUMIN, HUMAN INJ 5% 5 %
SOLUTION INTRAVENOUS CONTINUOUS PRN
Status: DISCONTINUED | OUTPATIENT
Start: 2023-02-03 | End: 2023-02-03

## 2023-02-03 RX ORDER — LORAZEPAM 2 MG/ML
1 INJECTION INTRAMUSCULAR
Status: DISCONTINUED | OUTPATIENT
Start: 2023-02-03 | End: 2023-02-03

## 2023-02-03 RX ORDER — DOCUSATE SODIUM 100 MG/1
100 CAPSULE, LIQUID FILLED ORAL 2 TIMES DAILY
Status: DISCONTINUED | OUTPATIENT
Start: 2023-02-03 | End: 2023-02-04 | Stop reason: HOSPADM

## 2023-02-03 RX ORDER — PANTOPRAZOLE SODIUM 40 MG/10ML
40 INJECTION, POWDER, LYOPHILIZED, FOR SOLUTION INTRAVENOUS DAILY
Status: DISCONTINUED | OUTPATIENT
Start: 2023-02-03 | End: 2023-02-04 | Stop reason: HOSPADM

## 2023-02-03 RX ORDER — ROCURONIUM BROMIDE 10 MG/ML
INJECTION, SOLUTION INTRAVENOUS AS NEEDED
Status: DISCONTINUED | OUTPATIENT
Start: 2023-02-03 | End: 2023-02-03

## 2023-02-03 RX ORDER — PROMETHAZINE HYDROCHLORIDE 25 MG/ML
12.5 INJECTION, SOLUTION INTRAMUSCULAR; INTRAVENOUS ONCE AS NEEDED
Status: DISCONTINUED | OUTPATIENT
Start: 2023-02-03 | End: 2023-02-03

## 2023-02-03 RX ORDER — BUPIVACAINE HYDROCHLORIDE 2.5 MG/ML
INJECTION, SOLUTION EPIDURAL; INFILTRATION; INTRACAUDAL AS NEEDED
Status: DISCONTINUED | OUTPATIENT
Start: 2023-02-03 | End: 2023-02-03 | Stop reason: HOSPADM

## 2023-02-03 RX ORDER — GLYCOPYRROLATE 0.2 MG/ML
INJECTION INTRAMUSCULAR; INTRAVENOUS AS NEEDED
Status: DISCONTINUED | OUTPATIENT
Start: 2023-02-03 | End: 2023-02-03

## 2023-02-03 RX ORDER — FENTANYL CITRATE 50 UG/ML
INJECTION, SOLUTION INTRAMUSCULAR; INTRAVENOUS AS NEEDED
Status: DISCONTINUED | OUTPATIENT
Start: 2023-02-03 | End: 2023-02-03

## 2023-02-03 RX ORDER — DIVALPROEX SODIUM 500 MG/1
1000 TABLET, EXTENDED RELEASE ORAL 2 TIMES DAILY
Status: DISCONTINUED | OUTPATIENT
Start: 2023-02-03 | End: 2023-02-04 | Stop reason: HOSPADM

## 2023-02-03 RX ORDER — EPHEDRINE SULFATE 50 MG/ML
INJECTION INTRAVENOUS AS NEEDED
Status: DISCONTINUED | OUTPATIENT
Start: 2023-02-03 | End: 2023-02-03

## 2023-02-03 RX ORDER — SODIUM CHLORIDE, SODIUM LACTATE, POTASSIUM CHLORIDE, CALCIUM CHLORIDE 600; 310; 30; 20 MG/100ML; MG/100ML; MG/100ML; MG/100ML
INJECTION, SOLUTION INTRAVENOUS CONTINUOUS PRN
Status: DISCONTINUED | OUTPATIENT
Start: 2023-02-03 | End: 2023-02-03

## 2023-02-03 RX ORDER — METHYLPHENIDATE HYDROCHLORIDE 5 MG/1
5 TABLET ORAL 2 TIMES DAILY PRN
Status: DISCONTINUED | OUTPATIENT
Start: 2023-02-03 | End: 2023-02-04 | Stop reason: HOSPADM

## 2023-02-03 RX ORDER — SODIUM CHLORIDE, SODIUM LACTATE, POTASSIUM CHLORIDE, CALCIUM CHLORIDE 600; 310; 30; 20 MG/100ML; MG/100ML; MG/100ML; MG/100ML
125 INJECTION, SOLUTION INTRAVENOUS CONTINUOUS
Status: DISCONTINUED | OUTPATIENT
Start: 2023-02-03 | End: 2023-02-03

## 2023-02-03 RX ADMIN — ROCURONIUM BROMIDE 20 MG: 10 INJECTION INTRAVENOUS at 09:50

## 2023-02-03 RX ADMIN — Medication 50 MG: at 07:35

## 2023-02-03 RX ADMIN — EPHEDRINE SULFATE 5 MG: 50 INJECTION INTRAVENOUS at 11:43

## 2023-02-03 RX ADMIN — VASOPRESSIN 2 UNITS: 20 INJECTION INTRAVENOUS at 11:10

## 2023-02-03 RX ADMIN — SODIUM CHLORIDE, SODIUM LACTATE, POTASSIUM CHLORIDE, AND CALCIUM CHLORIDE 60 ML/HR: .6; .31; .03; .02 INJECTION, SOLUTION INTRAVENOUS at 14:26

## 2023-02-03 RX ADMIN — ALBUMIN (HUMAN): 12.5 INJECTION, SOLUTION INTRAVENOUS at 08:02

## 2023-02-03 RX ADMIN — SODIUM CHLORIDE: 0.9 INJECTION, SOLUTION INTRAVENOUS at 11:49

## 2023-02-03 RX ADMIN — FENTANYL CITRATE 100 MCG: 50 INJECTION, SOLUTION INTRAMUSCULAR; INTRAVENOUS at 07:35

## 2023-02-03 RX ADMIN — VASOPRESSIN 1 UNITS: 20 INJECTION INTRAVENOUS at 09:14

## 2023-02-03 RX ADMIN — GLYCOPYRROLATE 0.4 MCG: 0.2 INJECTION INTRAMUSCULAR; INTRAVENOUS at 11:54

## 2023-02-03 RX ADMIN — FENTANYL CITRATE 25 MCG: 50 INJECTION INTRAMUSCULAR; INTRAVENOUS at 12:41

## 2023-02-03 RX ADMIN — FENTANYL CITRATE 50 MCG: 50 INJECTION, SOLUTION INTRAMUSCULAR; INTRAVENOUS at 11:56

## 2023-02-03 RX ADMIN — ACETAMINOPHEN 650 MG: 650 SUSPENSION ORAL at 14:22

## 2023-02-03 RX ADMIN — OXYCODONE HYDROCHLORIDE 10 MG: 5 TABLET ORAL at 14:23

## 2023-02-03 RX ADMIN — ACETAMINOPHEN 649.6 MG: 650 SUSPENSION ORAL at 19:24

## 2023-02-03 RX ADMIN — MIDAZOLAM 2 MG: 1 INJECTION INTRAMUSCULAR; INTRAVENOUS at 07:27

## 2023-02-03 RX ADMIN — VASOPRESSIN 2 UNITS: 20 INJECTION INTRAVENOUS at 09:49

## 2023-02-03 RX ADMIN — OXYCODONE HYDROCHLORIDE 10 MG: 5 TABLET ORAL at 23:45

## 2023-02-03 RX ADMIN — NOREPINEPHRINE BITARTRATE 2 MCG/MIN: 1 INJECTION INTRAVENOUS at 07:51

## 2023-02-03 RX ADMIN — SODIUM CHLORIDE: 0.9 INJECTION, SOLUTION INTRAVENOUS at 07:49

## 2023-02-03 RX ADMIN — PRAZOSIN HYDROCHLORIDE 1 MG: 1 CAPSULE ORAL at 21:19

## 2023-02-03 RX ADMIN — FENTANYL CITRATE 25 MCG: 50 INJECTION INTRAMUSCULAR; INTRAVENOUS at 12:46

## 2023-02-03 RX ADMIN — EPHEDRINE SULFATE 10 MG: 50 INJECTION INTRAVENOUS at 09:13

## 2023-02-03 RX ADMIN — ONDANSETRON 4 MG: 2 INJECTION INTRAMUSCULAR; INTRAVENOUS at 08:09

## 2023-02-03 RX ADMIN — ALBUMIN (HUMAN): 12.5 INJECTION, SOLUTION INTRAVENOUS at 08:04

## 2023-02-03 RX ADMIN — PROPOFOL 100 MG: 10 INJECTION, EMULSION INTRAVENOUS at 07:35

## 2023-02-03 RX ADMIN — DOCUSATE SODIUM 100 MG: 100 CAPSULE, LIQUID FILLED ORAL at 17:16

## 2023-02-03 RX ADMIN — SODIUM CHLORIDE, SODIUM LACTATE, POTASSIUM CHLORIDE, AND CALCIUM CHLORIDE 60 ML/HR: .6; .31; .03; .02 INJECTION, SOLUTION INTRAVENOUS at 16:20

## 2023-02-03 RX ADMIN — ROCURONIUM BROMIDE 50 MG: 10 INJECTION INTRAVENOUS at 07:35

## 2023-02-03 RX ADMIN — CEFAZOLIN 3000 MG: 1 INJECTION, POWDER, FOR SOLUTION INTRAMUSCULAR; INTRAVENOUS at 11:54

## 2023-02-03 RX ADMIN — ALBUMIN (HUMAN): 12.5 INJECTION, SOLUTION INTRAVENOUS at 07:53

## 2023-02-03 RX ADMIN — HYDROMORPHONE HYDROCHLORIDE 0.5 MG: 1 INJECTION, SOLUTION INTRAMUSCULAR; INTRAVENOUS; SUBCUTANEOUS at 13:06

## 2023-02-03 RX ADMIN — NEOSTIGMINE METHYLSULFATE 5 MG: 1 INJECTION INTRAVENOUS at 11:54

## 2023-02-03 RX ADMIN — HYDROMORPHONE HYDROCHLORIDE 0.5 MG: 1 INJECTION, SOLUTION INTRAMUSCULAR; INTRAVENOUS; SUBCUTANEOUS at 17:16

## 2023-02-03 RX ADMIN — FENTANYL CITRATE 25 MCG: 50 INJECTION INTRAMUSCULAR; INTRAVENOUS at 12:32

## 2023-02-03 RX ADMIN — VASOPRESSIN 2 UNITS: 20 INJECTION INTRAVENOUS at 09:23

## 2023-02-03 RX ADMIN — SODIUM CHLORIDE, SODIUM LACTATE, POTASSIUM CHLORIDE, AND CALCIUM CHLORIDE: .6; .31; .03; .02 INJECTION, SOLUTION INTRAVENOUS at 07:05

## 2023-02-03 RX ADMIN — METRONIDAZOLE: 500 INJECTION, SOLUTION INTRAVENOUS at 07:52

## 2023-02-03 RX ADMIN — ROCURONIUM BROMIDE 50 MG: 10 INJECTION INTRAVENOUS at 08:06

## 2023-02-03 RX ADMIN — ROCURONIUM BROMIDE 15 MG: 10 INJECTION INTRAVENOUS at 10:58

## 2023-02-03 RX ADMIN — FENTANYL CITRATE 50 MCG: 50 INJECTION, SOLUTION INTRAMUSCULAR; INTRAVENOUS at 11:51

## 2023-02-03 RX ADMIN — SODIUM CHLORIDE: 0.9 INJECTION, SOLUTION INTRAVENOUS at 09:50

## 2023-02-03 RX ADMIN — CEFAZOLIN SODIUM 2000 MG: 2 SOLUTION INTRAVENOUS at 07:52

## 2023-02-03 RX ADMIN — FENTANYL CITRATE 25 MCG: 50 INJECTION INTRAMUSCULAR; INTRAVENOUS at 12:36

## 2023-02-03 RX ADMIN — VASOPRESSIN 2 UNITS: 20 INJECTION INTRAVENOUS at 10:41

## 2023-02-03 RX ADMIN — DEXAMETHASONE SODIUM PHOSPHATE 10 MG: 10 INJECTION INTRAMUSCULAR; INTRAVENOUS at 08:09

## 2023-02-03 RX ADMIN — PANTOPRAZOLE SODIUM 40 MG: 40 INJECTION, POWDER, FOR SOLUTION INTRAVENOUS at 14:22

## 2023-02-03 RX ADMIN — LIDOCAINE HYDROCHLORIDE 50 MG: 10 INJECTION, SOLUTION EPIDURAL; INFILTRATION; INTRACAUDAL; PERINEURAL at 07:35

## 2023-02-03 NOTE — RESPIRATORY THERAPY NOTE
RT Protocol Note  Catia Saba 50 y o  male MRN: [id number]  Unit/Bed#: [facility] 547-82 Encounter: [id number]    Assessment    Principal Problem:    Esophageal diverticulum  Active Problems:    Obesity (BMI 30-39  9)    Asthma    HTN (hypertension)    Depression    Chronic pain after cancer treatment      Home Pulmonary Medications:  Albuterol        Past Medical History:   Diagnosis Date    Anxiety     Arthritis     "hips and knees"    Asthma     per pt "only if has a cold"    Bipolar disorder ([address] )     Blood in the stool     per pt "last time had this 2 mths ago or so"    Cancer [facility]) 2014    prostate    Chronic pain disorder     per pt "from the Lupron especially lower body"    Colon polyp     Cracked tooth     lower back right side tooth    Depression     Diverticulosis     Exercises 3 to 4 times per week     treadmill 30 min--light lifting    GERD (gastroesophageal reflux disease)     History of psychiatric treatment     History of therapeutic radiation 2015    x 36 rounds    Hyperlipidemia     Hypertension     Teeth missing     Wears glasses      Social History     Socioeconomic History    Marital status: /Civil Union     Spouse name: None    Number of children: None    Years of education: None    Highest education level: None   Occupational History    None   Tobacco Use    Smoking status: Never    Smokeless tobacco: Never   Vaping Use    Vaping Use: Never used   Substance and Sexual Activity    Alcohol use: Not Currently    Drug use: Never    Sexual activity: None     Comment: defer   Other Topics Concern    None   Social History Narrative    None     Social Determinants of Health     Financial Resource Strain: Not on file   Food Insecurity: Not on file   Transportation Needs: Not on file   Physical Activity: Not on file   Stress: Not on file   Social Connections: Not on file   Intimate Partner Violence: Not on file   Housing Stability: Not on file       Subjective         Objective    Physical Exam: Assessment Type: Assess only  General Appearance: Alert, Awake  Respiratory Pattern: Normal  Chest Assessment: Chest expansion symmetrical  Bilateral Breath Sounds: Clear  O2 Device: RA    Vitals:  Blood pressure 167/71, pulse 86, temperature 98 6 °F (37 °C), temperature source Oral, resp  rate 18, height 5' 11" (1 803 m), weight 124 kg (273 lb), SpO2 94 %  Imaging and other studies: I have personally reviewed pertinent reports  O2 Device: RA     Plan    Respiratory Plan: Mild Distress pathway  Airway Clearance Plan: Incentive Spirometer     Resp Comments: (P) Pt admited for Esophageal Diverticulum  Pt has a medical history of asthma and takes an Albuterol MDI at home as needed  Will also order Albuterl UDN PRN  At this time BS are clear and PT is on RA  Pt is also ordered on airway clearance protocol for IS  Pt instructed on IS at this time

## 2023-02-03 NOTE — INTERVAL H&P NOTE
H&P reviewed  After examining the patient I find no changes in the patients condition since the H&P had been written      Vitals:    02/03/23 0535   BP: 135/82   Pulse: 61   Temp: 97 8 °F (36 6 °C)   SpO2: 99%

## 2023-02-03 NOTE — ANESTHESIA PROCEDURE NOTES
Arterial Line Insertion  Performed by: Nadyne Cogan, CRNA  Authorized by: Simone Teixeira MD   Consent: Verbal consent obtained  Written consent obtained  Risks and benefits: risks, benefits and alternatives were discussed  Consent given by: patient and spouse  Patient understanding: patient states understanding of the procedure being performed  Patient consent: the patient's understanding of the procedure matches consent given  Procedure consent: procedure consent matches procedure scheduled  Relevant documents: relevant documents present and verified  Test results: test results available and properly labeled  Required items: required blood products, implants, devices, and special equipment available  Patient identity confirmed: verbally with patient, provided demographic data and arm band  Time out: Immediately prior to procedure a "time out" was called to verify the correct patient, procedure, equipment, support staff and site/side marked as required  Preparation: Patient was prepped and draped in the usual sterile fashion  Indications: hemodynamic monitoring  Orientation:  Right  Location: radial artery  Sedation:  Patient sedated: yes  Analgesia: see MAR for details  Vitals: Vital signs were monitored during sedation      Procedure Details:  Needle gauge: 20  Seldinger technique: Seldinger technique used  Number of attempts: 2    Post-procedure:  Post-procedure: dressing applied  Waveform: good waveform  Post-procedure CNS: normal  Patient tolerance: Patient tolerated the procedure well with no immediate complications and patient tolerated the procedure well with no immediate complications

## 2023-02-03 NOTE — OP NOTE
OPERATIVE REPORT  PATIENT NAME: Robin Crouch    :  1975  MRN: [id number]  Pt Location: [facility] OR ROOM 14    SURGERY DATE: 2/3/2023    Surgeon(s) and Role:     * Lili Jarrell MD - Primary     * Yeni Moser PA-C - Peri Jimenez MD - [address] [place] [address], [facility] - Anisa    Preop Diagnosis:  Esophageal epiphrenic diverticulum [Q39 6]    Post-Op Diagnosis Codes:     * Esophageal epiphrenic diverticulum [Q39 6]    Procedure(s):  ESOPHAGOGASTRODUODENOSCOPY (EGD)  ROBOTIC RESECTION OF EPIPHRENIC DIVERTICULUM  HELLER MYOTOMY  DAVY FUNDOPLICATION    Specimen(s):  ID Type Source Tests Collected by Time Destination   1 : ESOPHAGEAL DIVERTICULUM  Tissue Diverticulum TISSUE EXAM Lili Jarrell MD 2/3/2023 1000        Estimated Blood Loss:   Minimal    Drains:  [REMOVED] Urethral Catheter Coude 16 Fr  (Removed)   Collection Container Standard drainage bag 23 0755   Number of days: 0       Anesthesia Type:   General    Operative Indications:  Esophageal epiphrenic diverticulum [Q39 6]  Mr Jennifer Jackson has a symptomatic large epiphrenic diverticulum extending to the right  He has dysphagia as well as regurgitant symptoms  Barium swallow suggested normal esophageal function but his esophageal manometry, with the probe placed endoscopically, demonstrated absent esophageal contraction but a normal IRP  Operative Findings:  Large right epiphrenic diverticulum    Complications:   None    Procedure and Technique:  The patient was brought to the operating room placed in the supine position  A footboard was placed and multiple points of fixation over his lower body were performed  Upper endoscopy was then performed  The proximal and midesophagus appeared normal   Just above the GE junction there was a widemouth diverticulum off to the right  The diverticulum was emptied of its contents  The stomach was then entered and distends normally    The pylorus appeared to be functioning normally  The scope was retroflexed and the GE junction appeared normal   The excess air was suctioned from the GI tract and the scope was removed  His entire abdomen was prepped and draped into a sterile field  The abdomen was insufflated with a Veress needle at Zavala's point  An 8 5 mm camera port was placed just to the left of midline one third of the way up from his umbilicus to his xiphoid  Under direct vision, an 8 5 mm robotic port was placed to the right of midline for the surgeon's left hand  An 8 5 mm port was placed in the left lateral abdomen for robotic retraction port  In the left upper quadrant a 12 mm port was placed to accommodate the robotic stapler  An assistant port was placed to the right of the umbilicus for an assistant port  The robot was docked and a grasper was placed through the right sided abdominal port and a vessel sealer was placed through the left upper quadrant port  Another grasper was placed through the left lateral abdomen port  The gastrohepatic ligament was opened over the caudate lobe  There is a replaced left gastric artery that initially was attempted to be spared but was clear that the operation would not be able to be completed without in place  It was temporarily occluded without major changes in the liver and it was then divided with a linear stapler  The gastroesophageal junction was then  from the right umesh bluntly  Dissection was then carried up over the phrenoesophageal membrane which was divided with the vessel sealer  Dissection was then carried down the left umesh to the base  The GE junction was able to be swept up in a retroesophageal plane easily created  A Penrose drain was placed around the esophagus for retraction  Further dissection up into the mediastinum was performed  Initially the diverticulum was identified and  from the mediastinal structures    The esophagus was then circumferentially dissected for approximately 9 cm into the chest   Attention was then turned to the diverticulum and this was stripped of filmy tissue down to its neck so that the neck and the surrounding esophageal musculature was clearly identified  A 56 Bruneian bougie was then placed down into the stomach  The robotic stapler with a blue load was utilized to transect the base of the diverticulum  Absorbable 3-O V lock suture was then used to imbricate the esophageal musculature over the diverticular staple line  A esophageal myotomy was then begun approximately 150 degrees away from the staple line and approximately 1 cm above the GE junction  The mucosa was identified and using a curved bipolar the muscle was incised  The myotomy was carried out to approximately 5 cm into the mediastinum  At its highest point it was at least 1 cm above the diverticular staple line  During the dissection of the muscle off of the mucosa an approximate 8 mm mucosal defect was created  This was repaired with interrupted 3-0 Vicryl sutures  The myotomy was then carried distally for approximately 3 cm onto the stomach  Repeat endoscopy was then performed and this area was insufflated  The field was partially flooded with saline and there was no evidence of leakage of air from either the mucosa at the myotomy or at the level of the diverticular repair  The scope would easily traverse into the stomach from the esophagus  The esophagus was then lifted anteriorly and the esophageal hiatus was then recalibrated using 3 separate posterior sutures of 0 Ethibond  The fundus was then freed by dividing the short gastric vessels with the vessel sealer  A Jaskaran fundoplication was then performed  The fundus was initially sutured to the left crura and the left side of the esophageal myotomy to recreate the angle of Hiss  The fundus was then draped over the myotomy and sutured to the right side of the esophageal myotomy as well as the right umesh    2 further sutures between the fundus and the right umesh were also performed more inferiorly  Hemostasis was excellent  The liver retractor was removed  The liver appeared healthy  The 12 mm robotic stapling port and a 12 mm assistant port sites had their fascia repaired with 0 Vicryl  The ports were then removed under direct vision and the abdomen desufflated  The port sites were then repaired in layers with running absorbable suture to the subcutaneous and subcuticular layers  Sterile surgical glue was applied  The patient was then extubated on the table and brought to the recovery unit in stable condition having tolerated the procedure well  Sponge and instrument counts were correct  I was present for the entire procedure  There was no qualified resident available  Dr Reynold Donovan was a critical assistant for this operation for port placement, retraction, and wound closure      Patient Disposition:  PACU         SIGNATURE: Hua Ty MD  DATE: February 3, 2023  TIME: 4:24 PM

## 2023-02-03 NOTE — ANESTHESIA POSTPROCEDURE EVALUATION
Post-Op Assessment Note    CV Status:  Stable    Pain management: adequate     Mental Status:  Alert and awake   Hydration Status:  Stable   PONV Controlled:  Controlled   Airway Patency:  Patent      Post Op Vitals Reviewed: Yes      Staff: CRNA, Anesthesiologist         No notable events documented      BP   137/68   Temp   97 4   Pulse  96   Resp   18   SpO2   97 face mask

## 2023-02-03 NOTE — QUICK NOTE
Post-Op Check Note- Thoracic Surgery    S: Patient is s/p robotic esophageal myotomy w partial fundoplication  He tolerated the procedure well without any complications  Postoperatively, he states he is feeling well  O:   Vitals:    02/03/23 1441   BP:    Pulse: 86   Resp: 18   Temp:    SpO2: 94%       No intake/output data recorded  I/O this shift:   In: 4000 [I V :3100; IV Piggyback:900]  Out: 550 [Urine:450; Blood:100]    PE:  NAD, alert and oriented x3  Normocephalic, atraumatic  Moist mucous membranes   Norm resp effort on 2 L NC  Regular rate  Abd soft, nondistended, appropriately tender, incisions c/d/i  No calf tenderness or peripheral edema  CN grossly intact   Skin is warm and dry          Lab Results   Component Value Date    WBC 5 41 01/10/2023    HGB 13 0 01/10/2023    HCT 42 1 01/10/2023    MCV 81 (L) 01/10/2023     01/10/2023     Lab Results   Component Value Date    GLUCOSE 81 11/23/2014    CALCIUM 9 5 01/10/2023     11/23/2014    K 4 7 01/10/2023    CO2 28 01/10/2023     01/10/2023    BUN 15 01/10/2023    CREATININE 1 05 01/10/2023         A/P: 50 y o  M w/ history of an epiphrenic diverticulum, now s/p robotic esophageal myotomy w partial fundoplication 2/3       - Ice [email or web address]  - UGI tomorrow, 2/4   - prn analgesia  - OOB/Ambulate  - PT/OT  - DVT ppx     Bre Arroyo DO

## 2023-02-04 ENCOUNTER — APPOINTMENT (INPATIENT)
Dept: RADIOLOGY | Facility: HOSPITAL | Age: 48
End: 2023-02-04

## 2023-02-04 VITALS
HEART RATE: 71 BPM | TEMPERATURE: 97.6 F | HEIGHT: 71 IN | OXYGEN SATURATION: 95 % | WEIGHT: 278 LBS | RESPIRATION RATE: 16 BRPM | DIASTOLIC BLOOD PRESSURE: 76 MMHG | BODY MASS INDEX: 38.92 KG/M2 | SYSTOLIC BLOOD PRESSURE: 143 MMHG

## 2023-02-04 LAB
ANION GAP SERPL CALCULATED.3IONS-SCNC: 7 MMOL/L (ref 4–13)
BUN SERPL-MCNC: 13 MG/DL (ref 5–25)
CALCIUM SERPL-MCNC: 9.4 MG/DL (ref 8.3–10.1)
CHLORIDE SERPL-SCNC: 104 MMOL/L (ref 96–108)
CO2 SERPL-SCNC: 25 MMOL/L (ref 21–32)
CREAT SERPL-MCNC: 0.98 MG/DL (ref 0.6–1.3)
ERYTHROCYTE [DISTWIDTH] IN BLOOD BY AUTOMATED COUNT: 15.3 % (ref 11.6–15.1)
GFR SERPL CREATININE-BSD FRML MDRD: 90 ML/MIN/1.73SQ M
GLUCOSE SERPL-MCNC: 103 MG/DL (ref 65–140)
HCT VFR BLD AUTO: 39.1 % (ref 36.5–49.3)
HGB BLD-MCNC: 12.4 G/DL (ref 12–17)
MAGNESIUM SERPL-MCNC: 2 MG/DL (ref 1.6–2.6)
MCH RBC QN AUTO: 25.5 PG (ref 26.8–34.3)
MCHC RBC AUTO-ENTMCNC: 31.7 G/DL (ref 31.4–37.4)
MCV RBC AUTO: 81 FL (ref 82–98)
PLATELET # BLD AUTO: 248 THOUSANDS/UL (ref 149–390)
PMV BLD AUTO: 9.6 FL (ref 8.9–12.7)
POTASSIUM SERPL-SCNC: 3.9 MMOL/L (ref 3.5–5.3)
RBC # BLD AUTO: 4.86 MILLION/UL (ref 3.88–5.62)
SODIUM SERPL-SCNC: 136 MMOL/L (ref 135–147)
WBC # BLD AUTO: 13.52 THOUSAND/UL (ref 4.31–10.16)

## 2023-02-04 RX ORDER — DOCUSATE SODIUM 100 MG/1
100 CAPSULE, LIQUID FILLED ORAL 2 TIMES DAILY
Qty: 20 CAPSULE | Refills: 0 | Status: SHIPPED | OUTPATIENT
Start: 2023-02-04

## 2023-02-04 RX ORDER — OXYCODONE HYDROCHLORIDE 5 MG/1
5 TABLET ORAL EVERY 4 HOURS PRN
Qty: 30 TABLET | Refills: 0 | Status: SHIPPED | OUTPATIENT
Start: 2023-02-04 | End: 2023-02-14

## 2023-02-04 RX ORDER — ACETAMINOPHEN 160 MG/5ML
650 SUSPENSION, ORAL (FINAL DOSE FORM) ORAL EVERY 6 HOURS PRN
Qty: 375 ML | Refills: 0 | Status: SHIPPED | OUTPATIENT
Start: 2023-02-04 | End: 2023-02-11

## 2023-02-04 RX ADMIN — ALBUTEROL SULFATE 2.5 MG: 2.5 SOLUTION RESPIRATORY (INHALATION) at 07:56

## 2023-02-04 RX ADMIN — HYDROMORPHONE HYDROCHLORIDE 0.5 MG: 1 INJECTION, SOLUTION INTRAMUSCULAR; INTRAVENOUS; SUBCUTANEOUS at 06:46

## 2023-02-04 RX ADMIN — IOHEXOL 100 ML: 350 INJECTION, SOLUTION INTRAVENOUS at 09:30

## 2023-02-04 RX ADMIN — HYDROMORPHONE HYDROCHLORIDE 0.5 MG: 1 INJECTION, SOLUTION INTRAMUSCULAR; INTRAVENOUS; SUBCUTANEOUS at 01:10

## 2023-02-04 RX ADMIN — PANTOPRAZOLE SODIUM 40 MG: 40 INJECTION, POWDER, FOR SOLUTION INTRAVENOUS at 10:25

## 2023-02-04 RX ADMIN — ACETAMINOPHEN 650 MG: 650 SUSPENSION ORAL at 10:25

## 2023-02-04 RX ADMIN — OXYCODONE HYDROCHLORIDE 10 MG: 5 TABLET ORAL at 10:28

## 2023-02-04 RX ADMIN — OXYCODONE HYDROCHLORIDE 10 MG: 5 TABLET ORAL at 05:09

## 2023-02-04 NOTE — DISCHARGE SUMMARY
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 who presents for an updated H&P prior to robotic resection of diverticulum, Heller myotomy and Jaskaran fundoplication 9/3/58  His symptoms include regurgitation, and needing water to flush food boluses secondary to dysphagia  HE feels like his symptoms are a little worse compared to the last time that he saw us  - HPI written by Tamara Beckwith PA-C    Procedures Performed: 2/3 Robotic resection of epiphrenic diverticulum, heller myotomy, jaskaran fundoplication     Summary of Hospital Course: Patient underwent the above stated procedure without any complications  Postoperatively, he was placed on a ice chips, which he tolerated well  POD1 he underwent a barium swallow which demonstrated normal postoperative findings without evidence of a leak  He was advanced to a clear liquid diet, which he tolerated well  POD1, patient was ambulating without difficulty, tolerating a soft diet, voiding spontaneously and not requiring IV pain medication  He was deemed stable for discharge home on 2/4/23, with follow up in 2 weeks in the Thoracic Surgery Clinic  He will remain on a clear liquid diet until follow up  Significant Findings, Care, Treatment and Services Provided: 2/3 PACU CXR: Mild pulmonary vascular congestion  2/4 barium swallow: Unremarkable postoperative appearance      Complications: None    Discharge Diagnosis: Epiphrenic diverticulum     Medical Problems     Resolved Problems  Date Reviewed: 2/4/2023   None         Condition at Discharge: good         Discharge instructions/Information to patient and family:   See after visit summary for information provided to patient and family  Provisions for Follow-Up Care:  See after visit summary for information related to follow-up care and any pertinent home health orders  PCP: Emily Rivera MD    Disposition: Home    Planned Readmission: No      Discharge Statement   I spent 20 minutes discharging the patient  This time was spent on the day of discharge  I had direct contact with the patient on the day of discharge  Additional documentation is required if more than 30 minutes were spent on discharge  Discharge Medications:  See after visit summary for reconciled discharge medications provided to patient and family

## 2023-02-04 NOTE — PROGRESS NOTES
Progress Note - Thoracic Surgery   Starjose Powers 50 y o  male MRN: [id number]  Unit/Bed#: [facility] 412-01 Encounter: [id number]    Assessment:  Patient is a 50 y o  male w/ epiphrenic diverticulum s/p robotic resection w/ esophageal myotomy and partial fundoplication on 2/3      Plan:  • UGI  • Adv diet after  • OOB/ambulate  • Pulmonary Toilet/Incentive Spirometry  • Please [employer] On Call Thoracic Surgery Resident with any questions     Subjective/Objective     Subjective:   No acute events overnight  Pain controlled  With some pain in the left hand at former IV site  750 to 1 L on I-S has not required oxygen      Pertinent review of systems as above  All other review of systems negative  Objective:    Blood pressure 122/65, pulse 79, temperature 98 3 °F (36 8 °C), temperature source Oral, resp  rate 18, height 5' 11" (1 803 m), weight 126 kg (278 lb), SpO2 93 %  ,Body mass index is 38 77 kg/m²  Intake/Output Summary (Last 24 hours) at 2/4/2023 0624  Last data filed at 2/4/2023 0501  Gross per 24 hour   Intake 4851 ml   Output 2600 ml   Net 2251 ml       Invasive Devices     Peripheral Intravenous Line  Duration           Peripheral IV 02/03/23 Right Antecubital <1 day                Physical Exam:   Gen:  NAD  HEENT: NCAT  MMM  CV: well perfused  Chest wall: equal rise  Lungs: Normal respiratory effort,   Abd: soft, appropriately tender/nd, incisions cdi  Skin: warm/ dry  Extremities: no peripheral edema, no clubbing or cyanosis  Neuro: AxO x3      Results from last 7 days   Lab Units 02/04/23  0522   WBC Thousand/uL 13 52*   HEMOGLOBIN g/dL 12 4   HEMATOCRIT % 39 1   PLATELETS Thousands/uL 248           Invalid input(s): LABGLOM         I have personally reviewed pertinent films in PACS      Medications:   Scheduled Meds:  Current Facility-Administered Medications   Medication Dose Route Frequency Provider Last Rate   • acetaminophen  650 mg Oral Q6H Johnna Siddiqui PA-C     • albuterol  2 puff Inhalation Q6H PRN Douglas VASQUEZ Sykes     • albuterol  2 5 mg Nebulization Q4H PRN Christal Shirley MD     • divalproex sodium  1,000 mg Oral BID Douglas VASQUEZ Sykes     • docusate sodium  100 mg Oral BID Douglas VASQUEZ Sykes     • enoxaparin  40 mg Subcutaneous Daily Douglas VASQUEZ Sykes     • HYDROmorphone  0 5 mg Intravenous Q2H PRN Douglas VASQUEZ Sykes     • lactated ringers  60 mL/hr Intravenous Continuous Douglas VASQUEZ Sykes 60 mL/hr (02/03/23 2237)   • methylphenidate  5 mg Oral BID PRN Douglas VASQUEZ Sykes     • ondansetron  4 mg Intravenous Q6H PRN Douglas VASQUEZ Sykes     • oxyCODONE  10 mg Oral Q4H PRN Douglas VASQUEZ Sykes     • oxyCODONE  5 mg Oral Q4H PRN Douglas VASQUEZ Sykes     • pantoprazole  40 mg Intravenous Daily Douglas VASQUEZ Sykes     • polyethylene glycol  17 g Oral Daily Douglas VASQUEZ Sykes     • prazosin  1 mg Oral HS Douglas VASQUEZ Sykes     • senna  1 tablet Oral Daily Grecia Solis PA-C       Continuous Infusions:lactated ringers, 60 mL/hr, Last Rate: 60 mL/hr (02/03/23 2237)      PRN Meds:  albuterol, 2 puff, Q6H PRN  albuterol, 2 5 mg, Q4H PRN  HYDROmorphone, 0 5 mg, Q2H PRN  methylphenidate, 5 mg, BID PRN  ondansetron, 4 mg, Q6H PRN  oxyCODONE, 10 mg, Q4H PRN  oxyCODONE, 5 mg, Q4H PRN      VTE Pharmacologic Prophylaxis: Enoxaparin (Lovenox)  VTE Mechanical Prophylaxis: sequential compression device

## 2023-02-04 NOTE — PLAN OF CARE
Problem: GASTROINTESTINAL - ADULT  Goal: Minimal or absence of nausea and/or vomiting  Description: INTERVENTIONS:  - Administer IV fluids if ordered to ensure adequate hydration  - Maintain NPO status until nausea and vomiting are resolved  - Nasogastric tube if ordered  - Administer ordered antiemetic medications as needed  - Provide nonpharmacologic comfort measures as appropriate  - Advance diet as tolerated, if ordered  - Consider nutrition services referral to assist patient with adequate nutrition and appropriate food choices  2/3/2023 2049 by Bret Ley RN  Outcome: Progressing  2/3/2023 1534 by Bret Ley RN  Outcome: Progressing  Goal: Maintains or returns to baseline bowel function  Description: INTERVENTIONS:  - Assess bowel function  - Encourage oral fluids to ensure adequate hydration  - Administer IV fluids if ordered to ensure adequate hydration  - Administer ordered medications as needed  - Encourage mobilization and activity  - Consider nutritional services referral to assist patient with adequate nutrition and appropriate food choices  2/3/2023 2049 by Bret Ley RN  Outcome: Progressing  2/3/2023 1534 by Bret Ley RN  Outcome: Progressing  Goal: Maintains adequate nutritional intake  Description: INTERVENTIONS:  - Monitor percentage of each meal consumed  - Identify factors contributing to decreased intake, treat as appropriate  - Assist with meals as needed  - Monitor I&O, weight, and lab values if indicated  - Obtain nutrition services referral as needed  2/3/2023 2049 by Bret Ley RN  Outcome: Progressing  2/3/2023 1534 by Bret Ley RN  Outcome: Progressing  Goal: Oral mucous membranes remain intact  Description: INTERVENTIONS  - Assess oral mucosa and hygiene practices  - Implement preventative oral hygiene regimen  - Implement oral medicated treatments as ordered  - Initiate Nutrition services referral as needed  2/3/2023 2049 by Bret Ley RN  Outcome: Progressing  2/3/2023 1534 by Latesha Shells, RN  Outcome: Progressing

## 2023-02-04 NOTE — UTILIZATION REVIEW
Initial Clinical Review    Elective Inpatient surgical procedure  Age/Sex: 50 y o  male  Surgery Date: 2/3/23  Procedure: ESOPHAGOGASTRODUODENOSCOPY (EGD)  ROBOTIC RESECTION OF EPIPHRENIC DIVERTICULUM  HELLER MYOTOMY  DAVY FUNDOPLICATION  Anesthesia: general  Operative Findings: Large right epiphrenic diverticulum    POD#1 Progress Note:  Stable overnight, tolerating sips of clears, abdomen soft  Barium swallow study today then advance diet to clears, continue inc spirometry      Admission Orders: Date/Time/Statement:   Admission Orders (From admission, onward)     Ordered        02/03/23 0605  Inpatient Admission  Once                      Orders Placed This Encounter   Procedures   • Inpatient Admission     Standing Status:   Standing     Number of Occurrences:   1     Order Specific Question:   Level of Care     Answer:   Med Surg [16]     Order Specific Question:   Estimated length of stay     Answer:   Inpatient Only Surgery     Vital Signs: /76 (BP Location: Left arm)   Pulse 71   Temp 97 6 °F (36 4 °C) (Oral)   Resp 16   Ht 5' 11" (1 803 m)   Wt 126 kg (278 lb)   SpO2 95%   BMI 38 77 kg/m²     Pertinent Labs/Diagnostic Test Results:   XR chest portable    (Results Pending)   FL esophagram complete    (Results Pending)         Results from last 7 days   Lab Units 02/04/23  0522   WBC Thousand/uL 13 52*   HEMOGLOBIN g/dL 12 4   HEMATOCRIT % 39 1   PLATELETS Thousands/uL 248         Results from last 7 days   Lab Units 02/04/23  0522   SODIUM mmol/L 136   POTASSIUM mmol/L 3 9   CHLORIDE mmol/L 104   CO2 mmol/L 25   ANION GAP mmol/L 7   BUN mg/dL 13   CREATININE mg/dL 0 98   EGFR ml/min/1 73sq m 90   CALCIUM mg/dL 9 4   MAGNESIUM mg/dL 2 0     Results from last 7 days   Lab Units 02/04/23  0522   GLUCOSE RANDOM mg/dL 103     Diet: NPO and advance to clear following study  Mobility: OOB and ambulation  DVT Prophylaxis: lovenox, scd, ambulatory    Medications/Pain Control:   Scheduled Medications:  acetaminophen, 650 mg, Oral, Q6H  divalproex sodium, 1,000 mg, Oral, BID  docusate sodium, 100 mg, Oral, BID  enoxaparin, 40 mg, Subcutaneous, Daily  pantoprazole, 40 mg, Intravenous, Daily  polyethylene glycol, 17 g, Oral, Daily  prazosin, 1 mg, Oral, HS  senna, 1 tablet, Oral, Daily      Continuous IV Infusions:  lactated ringers, 60 mL/hr, Intravenous, Continuous      PRN Meds:  albuterol, 2 puff, Inhalation, Q6H PRN  albuterol, 2 5 mg, Nebulization, Q4H PRN  HYDROmorphone, 0 5 mg, Intravenous, Q2H PRN  methylphenidate, 5 mg, Oral, BID PRN  ondansetron, 4 mg, Intravenous, Q6H PRN  oxyCODONE, 10 mg, Oral, Q4H PRN  oxyCODONE, 5 mg, Oral, Q4H PRN        Network Utilization Review Department  ATTENTION: Please call with any questions or concerns to [phone] and carefully listen to the prompts so that you are directed to the right person  All voicemails are confidential   Leida Schmidt all requests for admission clinical reviews, approved or denied determinations and any other requests to dedicated fax number below belonging to the campus where the patient is receiving treatment   List of dedicated fax numbers for the Facilities:  [address] DENIALS (Administrative/Medical Necessity) [phone]   [address] (Maternity/NICU/Pediatrics) [phone]   3 Cate Dudley [phone]   [facility]  [phone]   [address] [place] [address] Amrit [address] [place] [address] 28 [phone]   [address] [address] [phone]

## 2023-02-06 ENCOUNTER — TELEPHONE (OUTPATIENT)
Dept: CARDIAC SURGERY | Facility: CLINIC | Age: 48
End: 2023-02-06

## 2023-02-06 NOTE — UTILIZATION REVIEW
NOTIFICATION OF ADMISSION DISCHARGE   This is a Notification of Discharge from [facility]  Please be advised that this patient has been discharge from our facility  Below you will find the admission and discharge date and time including the patient’s disposition  UTILIZATION REVIEW CONTACT:  Mark Barney  Utilization   Network Utilization Review Department  Phone: [phone] x carefully listen to the prompts  All voicemails are confidential   Email: [email or web address]     ADMISSION INFORMATION  PRESENTATION DATE: 2/3/2023  5:16 AM  OBERVATION ADMISSION DATE:  INPATIENT ADMISSION DATE: 2/3/23  6:05 AM   DISCHARGE DATE: 2/4/2023  3:12 PM   DISPOSITION:Home/Self Care    IMPORTANT INFORMATION:  Send all requests for admission clinical reviews, approved or denied determinations and any other requests to dedicated fax number below belonging to the campus where the patient is receiving treatment   List of dedicated fax numbers:  [address] DENIALS (Administrative/Medical Necessity) [phone]   [address] (Maternity/NICU/Pediatrics) [phone]   [facility] [phone]   [place] [address] [phone]   [address] [phone]   220 [facility] [phone] 90 [facility] [phone]   [address] [place] [address] 119 [phone]   [facility]  [phone] 405[facility] [phone]   412 [facility] [address] [facility] [phone]

## 2023-02-06 NOTE — TELEPHONE ENCOUNTER
Left message on VM on 2/5/23  Called today at 1240  He has been managing his pain appropriately, which is not severe  He is only taking pain medications as needed  He is continuing a liquid diet for a full 5 days  However, he has had a couple of temps up to 100  5  his most recent temp was 99 0  I told him if he has another fever over 100 0 to call me  He is in agreement with the plan

## 2023-02-09 ENCOUNTER — TELEPHONE (OUTPATIENT)
Dept: SURGICAL ONCOLOGY | Facility: CLINIC | Age: 48
End: 2023-02-09

## 2023-02-09 DIAGNOSIS — Q39.6 ESOPHAGEAL DIVERTICULUM: Primary | ICD-10-CM

## 2023-02-09 RX ORDER — OXYCODONE HYDROCHLORIDE 5 MG/1
5 TABLET ORAL EVERY 6 HOURS PRN
Qty: 15 TABLET | Refills: 0 | Status: SHIPPED | OUTPATIENT
Start: 2023-02-09

## 2023-02-09 RX ORDER — IBUPROFEN 600 MG/1
600 TABLET ORAL
Qty: 30 TABLET | Refills: 0 | Status: SHIPPED | OUTPATIENT
Start: 2023-02-09 | End: 2023-02-14

## 2023-02-09 NOTE — TELEPHONE ENCOUNTER
Fevers have resolved  He is tolerating a full liquid diet without any limitation  He may start to drink protein shakes and eat puddings, yogurts, etc  His pain is somewhat controlled, but I did have him take tylenol 650 mg for 5 days and motrin 600 mg tid for 5 days  I also refilled his oxycodone, and instructed him to take it BID, and then drop down to QHS  He is otherwise doing well   I will get back to him about starting more solid foods, since his post op appt is not until the 21st

## 2023-02-17 ENCOUNTER — HOSPITAL ENCOUNTER (OUTPATIENT)
Dept: RADIOLOGY | Facility: HOSPITAL | Age: 48
Discharge: HOME | End: 2023-02-17

## 2023-02-17 DIAGNOSIS — Q39.6 ESOPHAGEAL DIVERTICULUM: ICD-10-CM

## 2023-02-21 ENCOUNTER — OFFICE VISIT (OUTPATIENT)
Dept: CARDIAC SURGERY | Facility: CLINIC | Age: 48
End: 2023-02-21

## 2023-02-21 VITALS
OXYGEN SATURATION: 97 % | WEIGHT: 266.98 LBS | DIASTOLIC BLOOD PRESSURE: 78 MMHG | RESPIRATION RATE: 17 BRPM | HEIGHT: 71 IN | HEART RATE: 86 BPM | SYSTOLIC BLOOD PRESSURE: 124 MMHG | TEMPERATURE: 98.6 F | BODY MASS INDEX: 37.38 KG/M2

## 2023-02-21 DIAGNOSIS — Q39.6 ESOPHAGEAL DIVERTICULUM: Primary | ICD-10-CM

## 2023-02-21 NOTE — ASSESSMENT & PLAN NOTE
Mr Butch Miranda is doing well postoperatively  I have asked him to liberalize his diet  He will remain upright while he eats and then for 3 to 4 hours after eating  We will refrain from heavy lifting for at least 2 months but can walk as far as he wants or use a treadmill  We will see him back in 4 weeks

## 2023-03-20 ENCOUNTER — OFFICE VISIT (OUTPATIENT)
Dept: CARDIAC SURGERY | Facility: CLINIC | Age: 48
End: 2023-03-20

## 2023-03-20 VITALS
RESPIRATION RATE: 17 BRPM | BODY MASS INDEX: 36.36 KG/M2 | SYSTOLIC BLOOD PRESSURE: 125 MMHG | OXYGEN SATURATION: 97 % | HEART RATE: 81 BPM | WEIGHT: 259.7 LBS | DIASTOLIC BLOOD PRESSURE: 79 MMHG | HEIGHT: 71 IN | TEMPERATURE: 98.4 F

## 2023-03-20 DIAGNOSIS — Q39.6 ESOPHAGEAL DIVERTICULUM: Primary | ICD-10-CM

## 2023-03-20 NOTE — ASSESSMENT & PLAN NOTE
Mr Hetcor Brown is 6 weeks post-operative from a robotic resection of epiphrenix diverticulum, Heller myotomy, Jaskaran fundoplication on 7/2/4244  He is doing excellent from a thoracic surgery standpoint  He will continue to drink ample fluids when eating dry breads  He will maintain a weight-lifting limit of 20-25lbs  He will follow-up in our office one final time 6 months post-operatively  All questions were answered and he is in agreement with this plan

## 2023-03-20 NOTE — PROGRESS NOTES
Thoracic Follow-Up  Assessment/Plan:    Esophageal diverticulum  Mr More Mcdonald is 6 weeks post-operative from a robotic resection of epiphrenix diverticulum, Heller myotomy, Jaskaran fundoplication on 8/4/6313  He is doing excellent from a thoracic surgery standpoint  He will continue to drink ample fluids when eating dry breads  He will maintain a weight-lifting limit of 20-25lbs  He will follow-up in our office one final time 6 months post-operatively  All questions were answered and he is in agreement with this plan  Diagnoses and all orders for this visit:    Esophageal diverticulum        Patient had no 30 day hospital readmission    Thoracic History     Diagnosis: Epiphrenic diverticulum  Procedure: EGD, robotic resection of epiphrenic diverticulum, Heller myotomy, Jaskaran fundoplication on February 3, 2023     Subjective:    Patient ID: Bryce Garcia is a 50 y o  male  HPI   Mr More Mcdonald is a 50year old who presents 6 weeks post-operatively from a robotic resection of epiphrenic diverticulum, Heller myotomy, jaskaran fundoplication on 3/0/3407  He was last seen in our office on 2/21/2023 at which point we was doing very well, tolerating a normal diet and mostly pain free  On discussion today he is feeling overall well  He is eating measts, vegetables and fruits without issues  He does sometimes feel that dry breads/croissants/bagels get stuck in his lower chest, but once he drinks water they go down  He denies regurgitation, heartburn, nausea, vomiting, pain  The following portions of the patient's history were reviewed and updated as appropriate: allergies, current medications, past family history, past medical history, past social history, past surgical history and problem list     Review of Systems   Constitutional: Negative for chills, diaphoresis and unexpected weight change  HENT: Negative  Eyes: Negative  Respiratory: Negative for cough, shortness of breath and wheezing  Cardiovascular: Negative for chest pain and leg swelling  Gastrointestinal: Negative for abdominal pain, diarrhea and nausea  Endocrine: Negative  Genitourinary: Negative  Musculoskeletal: Negative  Skin: Negative  Allergic/Immunologic: Negative  Neurological: Negative  Hematological: Negative for adenopathy  Does not bruise/bleed easily  Psychiatric/Behavioral: Negative  All other systems reviewed and are negative  Objective:   Physical Exam  Vitals reviewed  Constitutional:       General: He is not in acute distress  Appearance: Normal appearance  He is not ill-appearing  HENT:      Head: Normocephalic  Nose: Nose normal       Mouth/Throat:      Mouth: Mucous membranes are moist    Eyes:      Pupils: Pupils are equal, round, and reactive to light  Cardiovascular:      Rate and Rhythm: Normal rate and regular rhythm  Heart sounds: Normal heart sounds  Pulmonary:      Effort: Pulmonary effort is normal       Breath sounds: Normal breath sounds  No wheezing, rhonchi or rales  Abdominal:      Palpations: Abdomen is soft  Comments: Surgical incisions well healed   Musculoskeletal:         General: No swelling  Normal range of motion  Lymphadenopathy:      Cervical: No cervical adenopathy  Skin:     General: Skin is warm  Neurological:      General: No focal deficit present  Mental Status: He is alert and oriented to person, place, and time  Psychiatric:         Mood and Affect: Mood normal      /79 (BP Location: Right arm, Patient Position: Sitting, Cuff Size: Large)   Pulse 81   Temp 98 4 °F (36 9 °C) (Temporal)   Resp 17   Ht 5' 11" (1 803 m)   Wt 118 kg (259 lb 11 2 oz)   SpO2 97%   BMI 36 22 kg/m²     XR chest pa & lateral    Result Date: 2/19/2023  Impression No acute cardiopulmonary disease  Workstation performed: [id number]      No CT Chest results available for this patient    No CT Chest,Abdomen,Pelvis results available for this patient  No NM PET CT results available for this patient  FL esophagram complete    Result Date: 2/4/2023  Narrative BARIUM SWALLOW-ESOPHAGRAM INDICATION:   s/p heller myotomy  COMPARISON:  None IMAGES:  11 FLUOROSCOPY TIME:   50 seconds  TECHNIQUE: The patient was given water-soluble contrast by mouth and images of the esophagus were obtained  FINDINGS: The esophagus is normal in caliber  There is normal emptying through the GE junction  There is no evidence of leak  Impression Unremarkable postoperative appearance  Workstation performed: [id number]     FL barium swallow ROUTINE esophagus    Result Date: 11/22/2022  Narrative BARIUM SWALLOW-ESOPHAGRAM INDICATION:   Q39 6: Congenital diverticulum of esophagus  COMPARISON:  CT abdomen/pelvis 8/24/2022  EGD 10/19/2022  IMAGES:  197  FLUOROSCOPY TIME:   0 8 minutes  TECHNIQUE: The patient was given effervescent granules and barium by mouth and images of the esophagus were obtained  FINDINGS: The esophagus is normal in caliber  Esophageal motility is normal and emptying of contrast from the esophagus is prompt  No mucosal lesion, ulceration, or evidence of fold thickening is seen  Arising from the right lateral aspect of the distal esophagus just above the hemidiaphragm, is a smooth rounded outpouching with the approximate size of 2 vertebral bodies and narrow communication to the esophagus, consistent with epiphrenic diverticulum  Contrast flows freely past the diverticulum into the stomach, and also flows freely into the diverticulum  There is retention of contrast within the diverticulum, with emptying dependent on patient positioning  Gastroesophageal reflux was not observed  Impression Epiphrenic diverticulum similar to prior CT examinations   Workstation performed: [id number]

## 2023-06-27 ENCOUNTER — HOSPITAL ENCOUNTER (EMERGENCY)
Facility: HOSPITAL | Age: 48
Discharge: HOME | End: 2023-06-28
Attending: EMERGENCY MEDICINE
Payer: MEDICARE

## 2023-06-27 ENCOUNTER — APPOINTMENT (EMERGENCY)
Dept: CT IMAGING | Facility: HOSPITAL | Age: 48
End: 2023-06-27
Payer: MEDICARE

## 2023-06-27 VITALS
TEMPERATURE: 98.4 F | OXYGEN SATURATION: 98 % | DIASTOLIC BLOOD PRESSURE: 72 MMHG | HEART RATE: 69 BPM | RESPIRATION RATE: 18 BRPM | SYSTOLIC BLOOD PRESSURE: 118 MMHG

## 2023-06-27 DIAGNOSIS — R10.9 ABDOMINAL PAIN: ICD-10-CM

## 2023-06-27 DIAGNOSIS — R11.0 NAUSEA: Primary | ICD-10-CM

## 2023-06-27 DIAGNOSIS — R19.7 DIARRHEA: ICD-10-CM

## 2023-06-27 LAB
ALBUMIN SERPL BCP-MCNC: 4.1 G/DL (ref 3.5–5)
ALP SERPL-CCNC: 53 U/L (ref 34–104)
ALT SERPL W P-5'-P-CCNC: 15 U/L (ref 7–52)
ANION GAP SERPL CALCULATED.3IONS-SCNC: 7 MMOL/L
APTT PPP: 24 SECONDS (ref 23–37)
AST SERPL W P-5'-P-CCNC: 15 U/L (ref 13–39)
BASOPHILS # BLD AUTO: 0.03 THOUSANDS/ÂΜL (ref 0–0.1)
BASOPHILS NFR BLD AUTO: 0 % (ref 0–1)
BILIRUB DIRECT SERPL-MCNC: 0.08 MG/DL (ref 0–0.2)
BILIRUB SERPL-MCNC: 0.4 MG/DL (ref 0.2–1)
BILIRUB UR QL STRIP: NEGATIVE
BUN SERPL-MCNC: 15 MG/DL (ref 5–25)
CALCIUM SERPL-MCNC: 8.9 MG/DL (ref 8.4–10.2)
CHLORIDE SERPL-SCNC: 107 MMOL/L (ref 96–108)
CLARITY UR: CLEAR
CO2 SERPL-SCNC: 25 MMOL/L (ref 21–32)
COLOR UR: COLORLESS
CREAT SERPL-MCNC: 1.23 MG/DL (ref 0.6–1.3)
EOSINOPHIL # BLD AUTO: 0.05 THOUSAND/ÂΜL (ref 0–0.61)
EOSINOPHIL NFR BLD AUTO: 1 % (ref 0–6)
ERYTHROCYTE [DISTWIDTH] IN BLOOD BY AUTOMATED COUNT: 15.1 % (ref 11.6–15.1)
GFR SERPL CREATININE-BSD FRML MDRD: 68 ML/MIN/1.73SQ M
GLUCOSE SERPL-MCNC: 95 MG/DL (ref 65–140)
GLUCOSE UR STRIP-MCNC: NEGATIVE MG/DL
HCT VFR BLD AUTO: 38.3 % (ref 36.5–49.3)
HGB BLD-MCNC: 12.2 G/DL (ref 12–17)
HGB UR QL STRIP.AUTO: NEGATIVE
IMM GRANULOCYTES # BLD AUTO: 0.02 THOUSAND/UL (ref 0–0.2)
IMM GRANULOCYTES NFR BLD AUTO: 0 % (ref 0–2)
INR PPP: 1.04 (ref 0.84–1.19)
KETONES UR STRIP-MCNC: NEGATIVE MG/DL
LEUKOCYTE ESTERASE UR QL STRIP: NEGATIVE
LIPASE SERPL-CCNC: 45 U/L (ref 11–82)
LYMPHOCYTES # BLD AUTO: 2.84 THOUSANDS/ÂΜL (ref 0.6–4.47)
LYMPHOCYTES NFR BLD AUTO: 36 % (ref 14–44)
MCH RBC QN AUTO: 26 PG (ref 26.8–34.3)
MCHC RBC AUTO-ENTMCNC: 31.9 G/DL (ref 31.4–37.4)
MCV RBC AUTO: 82 FL (ref 82–98)
MONOCYTES # BLD AUTO: 0.46 THOUSAND/ÂΜL (ref 0.17–1.22)
MONOCYTES NFR BLD AUTO: 6 % (ref 4–12)
NEUTROPHILS # BLD AUTO: 4.57 THOUSANDS/ÂΜL (ref 1.85–7.62)
NEUTS SEG NFR BLD AUTO: 57 % (ref 43–75)
NITRITE UR QL STRIP: NEGATIVE
NRBC BLD AUTO-RTO: 0 /100 WBCS
PH UR STRIP.AUTO: 5 [PH]
PLATELET # BLD AUTO: 313 THOUSANDS/UL (ref 149–390)
PMV BLD AUTO: 10.6 FL (ref 8.9–12.7)
POTASSIUM SERPL-SCNC: 4 MMOL/L (ref 3.5–5.3)
PROT SERPL-MCNC: 7 G/DL (ref 6.4–8.4)
PROT UR STRIP-MCNC: NEGATIVE MG/DL
PROTHROMBIN TIME: 13.8 SECONDS (ref 11.6–14.5)
RBC # BLD AUTO: 4.7 MILLION/UL (ref 3.88–5.62)
SODIUM SERPL-SCNC: 139 MMOL/L (ref 135–147)
SP GR UR STRIP.AUTO: 1.01 (ref 1–1.03)
UROBILINOGEN UR STRIP-ACNC: <2 MG/DL
WBC # BLD AUTO: 7.97 THOUSAND/UL (ref 4.31–10.16)

## 2023-06-27 PROCEDURE — 80076 HEPATIC FUNCTION PANEL: CPT

## 2023-06-27 PROCEDURE — 80048 BASIC METABOLIC PNL TOTAL CA: CPT

## 2023-06-27 PROCEDURE — 85730 THROMBOPLASTIN TIME PARTIAL: CPT

## 2023-06-27 PROCEDURE — 74177 CT ABD & PELVIS W/CONTRAST: CPT

## 2023-06-27 PROCEDURE — 85025 COMPLETE CBC W/AUTO DIFF WBC: CPT

## 2023-06-27 PROCEDURE — 83690 ASSAY OF LIPASE: CPT

## 2023-06-27 PROCEDURE — 85610 PROTHROMBIN TIME: CPT

## 2023-06-27 PROCEDURE — 36415 COLL VENOUS BLD VENIPUNCTURE: CPT

## 2023-06-27 PROCEDURE — G1004 CDSM NDSC: HCPCS

## 2023-06-27 PROCEDURE — 81003 URINALYSIS AUTO W/O SCOPE: CPT

## 2023-06-27 RX ORDER — MORPHINE SULFATE 10 MG/ML
7 INJECTION, SOLUTION INTRAMUSCULAR; INTRAVENOUS ONCE
Status: COMPLETED | OUTPATIENT
Start: 2023-06-27 | End: 2023-06-27

## 2023-06-27 RX ORDER — ONDANSETRON 2 MG/ML
4 INJECTION INTRAMUSCULAR; INTRAVENOUS ONCE
Status: COMPLETED | OUTPATIENT
Start: 2023-06-27 | End: 2023-06-27

## 2023-06-27 RX ADMIN — MORPHINE SULFATE 7 MG: 10 INJECTION INTRAVENOUS at 21:24

## 2023-06-27 RX ADMIN — ONDANSETRON 4 MG: 2 INJECTION INTRAMUSCULAR; INTRAVENOUS at 21:24

## 2023-06-27 RX ADMIN — SODIUM CHLORIDE 1000 ML: 0.9 INJECTION, SOLUTION INTRAVENOUS at 21:22

## 2023-06-27 RX ADMIN — IOHEXOL 100 ML: 350 INJECTION, SOLUTION INTRAVENOUS at 22:03

## 2023-06-28 RX ORDER — MORPHINE SULFATE 4 MG/ML
4 INJECTION, SOLUTION INTRAMUSCULAR; INTRAVENOUS ONCE
Status: COMPLETED | OUTPATIENT
Start: 2023-06-28 | End: 2023-06-28

## 2023-06-28 RX ADMIN — MORPHINE SULFATE 4 MG: 4 INJECTION INTRAVENOUS at 00:29

## 2023-06-28 NOTE — ED ATTENDING ATTESTATION
6/27/2023  IBailey MD, saw and evaluated the patient  I have discussed the patient with the resident/non-physician practitioner and agree with the resident's/non-physician practitioner's findings, Plan of Care, and MDM as documented in the resident's/non-physician practitioner's note, except where noted  All available labs and Radiology studies were reviewed  I was present for key portions of any procedure(s) performed by the resident/non-physician practitioner and I was immediately available to provide assistance  At this point I agree with the current assessment done in the Emergency Department  I have conducted an independent evaluation of this patient a history and physical is as follows: Diffuse abdominal pain worsened after bending over to  trash this evening  Diffuse abdominal tenderness  Multiple previous intra-abdominal operations  Labs unremarkable, symptoms improved with ED management, CT scan report pending at time of signout to Dr Shaun Bravo at 0000  Plan to follow-up on CT results, disposition accordingly      Results Reviewed     Procedure Component Value Units Date/Time    Lipase [[id number]]  (Normal) Collected: 06/27/23 2119    Lab Status: Final result Specimen: Blood from Arm, Right Updated: 06/27/23 2147     Lipase 45 u/L     Basic metabolic panel [[id number]] Collected: 06/27/23 2119    Lab Status: Final result Specimen: Blood from Arm, Right Updated: 06/27/23 2147     Sodium 139 mmol/L      Potassium 4 0 mmol/L      Chloride 107 mmol/L      CO2 25 mmol/L      ANION GAP 7 mmol/L      BUN 15 mg/dL      Creatinine 1 23 mg/dL      Glucose 95 mg/dL      Calcium 8 9 mg/dL      eGFR 68 ml/min/1 73sq m     Narrative:      [facility] guidelines for Chronic Kidney Disease (CKD):   •  Stage 1 with normal or high GFR (GFR > 90 mL/min/1 73 square meters)  •  Stage 2 Mild CKD (GFR = 60-89 mL/min/1 73 square meters)  •  Stage 3A Moderate CKD (GFR = 45-59 mL/min/1 73 square meters)  •  Stage 3B Moderate CKD (GFR = 30-44 mL/min/1 73 square meters)  •  Stage 4 Severe CKD (GFR = 15-29 mL/min/1 73 square meters)  •  Stage 5 End Stage CKD (GFR <15 mL/min/1 73 square meters)  Note: GFR calculation is accurate only with a steady state creatinine    Hepatic function panel [[id number]]  (Normal) Collected: 06/27/23 2119    Lab Status: Final result Specimen: Blood from Arm, Right Updated: 06/27/23 2147     Total Bilirubin 0 40 mg/dL      Bilirubin, Direct 0 08 mg/dL      Alkaline Phosphatase 53 U/L      AST 15 U/L      ALT 15 U/L      Total Protein 7 0 g/dL      Albumin 4 1 g/dL     Protime-INR [[id number]]  (Normal) Collected: 06/27/23 2119    Lab Status: Final result Specimen: Blood from Arm, Right Updated: 06/27/23 2143     Protime 13 8 seconds      INR 1 04    APTT [[id number]]  (Normal) Collected: 06/27/23 2119    Lab Status: Final result Specimen: Blood from Arm, Right Updated: 06/27/23 2143     PTT 24 seconds     UA w Reflex to Microscopic w Reflex to Culture [[id number]] Collected: 06/27/23 2120    Lab Status: Final result Specimen: Urine, Clean Catch Updated: 06/27/23 2134     Color, UA Colorless     Clarity, UA Clear     Specific Gravity, UA 1 007     pH, UA 5 0     Leukocytes, UA Negative     Nitrite, UA Negative     Protein, UA Negative mg/dl      Glucose, UA Negative mg/dl      Ketones, UA Negative mg/dl      Urobilinogen, UA <2 0 mg/dl      Bilirubin, UA Negative     Occult Blood, UA Negative    CBC and differential [[id number]]  (Abnormal) Collected: 06/27/23 2119    Lab Status: Final result Specimen: Blood from Arm, Right Updated: 06/27/23 2133     WBC 7 97 Thousand/uL      RBC 4 70 Million/uL      Hemoglobin 12 2 g/dL      Hematocrit 38 3 %      MCV 82 fL      MCH 26 0 pg      MCHC 31 9 g/dL      RDW 15 1 %      MPV 10 6 fL      Platelets 481 Thousands/uL      nRBC 0 /100 WBCs      Neutrophils Relative 57 %      Immat GRANS % 0 %      Lymphocytes Relative 36 %      Monocytes Relative 6 %      Eosinophils Relative 1 %      Basophils Relative 0 %      Neutrophils Absolute 4 57 Thousands/µL      Immature Grans Absolute 0 02 Thousand/uL      Lymphocytes Absolute 2 84 Thousands/µL      Monocytes Absolute 0 46 Thousand/µL      Eosinophils Absolute 0 05 Thousand/µL      Basophils Absolute 0 03 Thousands/µL         CT abdomen pelvis with contrast    (Results Pending)         ED Course         Critical Care Time  Procedures

## 2023-06-28 NOTE — ED PROVIDER NOTES
History  Chief Complaint   Patient presents with   • Abdominal Pain     Pt c/o abdominal pain x1 month, worsening this past week  Reports nausea, diarrhea, dizziness when walking  Hx diverticulitis  39-year-old male with past medical history of esophageal diverticulum with severe GERD status post esophagomyotomy with fundoplasty in 2023, diverticulitis, anxiety, asthma, remote history of prostate cancer currently in remission presents for evaluation of progressively worsening low abdominal pain x3 to 4 weeks which has worsened specifically over the past 1 to 2 weeks  Patient reports he has also had diarrhea x2 weeks along with chills and nausea, however denies associated symptoms of fever, rash, vomiting, obstipation, constipation, dysuria, chest pain, back pain, shortness of breath, melena or any other symptoms  Patient reports he has noticed early satiety and significant weight loss after fundoplasty was performed and has lost a total of about 40 pounds since then  No other concerns  Prior to Admission Medications   Prescriptions Last Dose Informant Patient Reported? Taking?    albuterol (PROVENTIL HFA,VENTOLIN HFA) 90 mcg/act inhaler   Yes No   Sig: INHALE 2 PUFFS BY MOUTH EVERY 6 HOURS AS NEEDED FOR WHEEZE   amLODIPine (NORVASC) 10 mg tablet  Self Yes No   Sig: Take 10 mg by mouth every morning   atorvastatin (LIPITOR) 20 mg tablet  Self Yes No   Sig: Take 20 mg by mouth daily at bedtime   divalproex sodium (DEPAKOTE ER) 500 mg 24 hr tablet  Self Yes No   Si tab po qam and 2 tabs po qhs   docusate sodium (COLACE) 100 mg capsule   No No   Sig: Take 1 capsule (100 mg total) by mouth 2 (two) times a day   ibuprofen (MOTRIN) 600 mg tablet   No No   Sig: Take 1 tablet (600 mg total) by mouth 3 (three) times a day with meals for 5 days   leuprolide (LUPRON DEPOT 3 MONTH KIT) 22 5 mg injection  Self Yes No   Sig: Inject 22 5 mg into a muscle every 3 (three) months Per pt next dose end of 2023 "  lisinopril (ZESTRIL) 40 mg tablet  Self Yes No   Sig: Take 40 mg by mouth every morning   methylphenidate (RITALIN) 5 mg tablet  Self Yes No   Sig: if needed   oxyCODONE (Roxicodone) 5 immediate release tablet   No No   Sig: Take 1 tablet (5 mg total) by mouth every 6 (six) hours as needed for severe pain Max Daily Amount: 20 mg   prazosin (MINIPRESS) 1 mg capsule  Self Yes No   Si tab po qhs x 1 week then 2 tabs qhs   prochlorperazine (COMPAZINE) 10 mg tablet   Yes No   Sig: TAKE 1 TABLET EVERY 6 HOURS X 30 DAYS AS NEEDED FOR NAUSEA AND VOMITING      Facility-Administered Medications: None       Past Medical History:   Diagnosis Date   • Anxiety    • Arthritis     \"hips and knees\"   • Asthma     per pt \"only if has a cold\"   • Bipolar disorder ([facility] )    • Blood in the stool     per pt \"last time had this 2 mths ago or so\"   • Cancer ([facility] )     prostate   • Chronic pain disorder     per pt \"from the Lupron especially lower body\"   • Colon polyp    • Cracked tooth     lower back right side tooth   • Depression    • Diverticulosis    • Exercises 3 to 4 times per week     treadmill 30 min--light lifting   • GERD (gastroesophageal reflux disease)    • History of psychiatric treatment    • History of therapeutic radiation 2015    x 36 rounds   • Hyperlipidemia    • Hypertension    • Teeth missing    • Wears glasses        Past Surgical History:   Procedure Laterality Date   • COLONOSCOPY     • FRACTURE SURGERY Left     wrist   • PENILE PROSTHESIS IMPLANT     • SD ESOPHAGOGASTRODUODENOSCOPY TRANSORAL DIAGNOSTIC N/A 2/3/2023    Procedure: ESOPHAGOGASTRODUODENOSCOPY (EGD);   Surgeon: Ratna Godinez MD;  Location: [facility];  Service: Thoracic   • SD LAPS ESOPHAGOMYOTOMY W/FUNDOPLASTY IF PERFORMED N/A 2/3/2023    Procedure: robotic assisted laparoscopic esophageal myotomy w/ partial fundoplication;  Surgeon: Ratna Godinez MD;  Location: [facility];  Service: Thoracic   • PROSTATECTOMY     • " UPPER GASTROINTESTINAL ENDOSCOPY     • US GUIDED FINE NEEDLE ASPIRATION (ALL INC)  12/16/2014       Family History   Problem Relation Age of Onset   • Cancer Other    • Hypertension Other    • Hyperlipidemia Other      I have reviewed and agree with the history as documented  E-Cigarette/Vaping   • E-Cigarette Use Never User      E-Cigarette/Vaping Substances     Social History     Tobacco Use   • Smoking status: Never   • Smokeless tobacco: Never   Vaping Use   • Vaping Use: Never used   Substance Use Topics   • Alcohol use: Not Currently   • Drug use: Never        Review of Systems   Constitutional: Positive for chills  Negative for fever  HENT: Negative for ear pain and sore throat  Eyes: Negative for pain and visual disturbance  Respiratory: Negative for cough and shortness of breath  Cardiovascular: Negative for chest pain and palpitations  Gastrointestinal: Positive for abdominal pain, diarrhea and nausea  Negative for vomiting  Genitourinary: Negative for dysuria and hematuria  Musculoskeletal: Negative for arthralgias and back pain  Skin: Negative for color change and rash  Neurological: Negative for seizures and syncope  All other systems reviewed and are negative  Physical Exam  ED Triage Vitals [06/27/23 2014]   Temperature Pulse Respirations Blood Pressure SpO2   98 4 °F (36 9 °C) 70 18 132/72 99 %      Temp Source Heart Rate Source Patient Position - Orthostatic VS BP Location FiO2 (%)   Oral Monitor -- Right arm --      Pain Score       7             Orthostatic Vital Signs  Vitals:    06/27/23 2014 06/27/23 2030   BP: 132/72 118/72   Pulse: 70 69       Physical Exam  Vitals and nursing note reviewed  Constitutional:       General: He is not in acute distress  Appearance: He is well-developed  He is not ill-appearing, toxic-appearing or diaphoretic  HENT:      Head: Normocephalic and atraumatic        Mouth/Throat:      Mouth: Mucous membranes are moist    Eyes: Extraocular Movements: Extraocular movements intact  Conjunctiva/sclera: Conjunctivae normal    Cardiovascular:      Rate and Rhythm: Normal rate and regular rhythm  Heart sounds: Normal heart sounds  No murmur heard  Pulmonary:      Effort: Pulmonary effort is normal  No respiratory distress  Breath sounds: Normal breath sounds  Abdominal:      General: Abdomen is flat  Bowel sounds are increased  Palpations: Abdomen is soft  Tenderness: There is generalized abdominal tenderness and tenderness in the right upper quadrant, epigastric area and left lower quadrant  There is left CVA tenderness  There is no guarding or rebound  Positive signs include Verde's sign  Musculoskeletal:         General: No swelling  Cervical back: Neck supple  Skin:     General: Skin is warm and dry  Capillary Refill: Capillary refill takes less than 2 seconds  Neurological:      General: No focal deficit present  Mental Status: He is alert and oriented to person, place, and time     Psychiatric:         Mood and Affect: Mood normal          Behavior: Behavior normal          ED Medications  Medications   morphine injection 4 mg (has no administration in time range)   ondansetron (ZOFRAN) injection 4 mg (4 mg Intravenous Given 6/27/23 2124)   morphine injection 7 mg (7 mg Intravenous Given 6/27/23 2124)   sodium chloride 0 9 % bolus 1,000 mL (0 mL Intravenous Stopped 6/27/23 2350)   iohexol (OMNIPAQUE) 350 MG/ML injection (SINGLE-DOSE) 100 mL (100 mL Intravenous Given 6/27/23 2203)       Diagnostic Studies  Results Reviewed     Procedure Component Value Units Date/Time    Lipase [[id number]]  (Normal) Collected: 06/27/23 2119    Lab Status: Final result Specimen: Blood from Arm, Right Updated: 06/27/23 2147     Lipase 45 u/L     Basic metabolic panel [[id number]] Collected: 06/27/23 2119    Lab Status: Final result Specimen: Blood from Arm, Right Updated: 06/27/23 2147     Sodium 139 mmol/L Potassium 4 0 mmol/L      Chloride 107 mmol/L      CO2 25 mmol/L      ANION GAP 7 mmol/L      BUN 15 mg/dL      Creatinine 1 23 mg/dL      Glucose 95 mg/dL      Calcium 8 9 mg/dL      eGFR 68 ml/min/1 73sq m     Narrative:      [facility] guidelines for Chronic Kidney Disease (CKD):   •  Stage 1 with normal or high GFR (GFR > 90 mL/min/1 73 square meters)  •  Stage 2 Mild CKD (GFR = 60-89 mL/min/1 73 square meters)  •  Stage 3A Moderate CKD (GFR = 45-59 mL/min/1 73 square meters)  •  Stage 3B Moderate CKD (GFR = 30-44 mL/min/1 73 square meters)  •  Stage 4 Severe CKD (GFR = 15-29 mL/min/1 73 square meters)  •  Stage 5 End Stage CKD (GFR <15 mL/min/1 73 square meters)  Note: GFR calculation is accurate only with a steady state creatinine    Hepatic function panel [[id number]]  (Normal) Collected: 06/27/23 2119    Lab Status: Final result Specimen: Blood from Arm, Right Updated: 06/27/23 2147     Total Bilirubin 0 40 mg/dL      Bilirubin, Direct 0 08 mg/dL      Alkaline Phosphatase 53 U/L      AST 15 U/L      ALT 15 U/L      Total Protein 7 0 g/dL      Albumin 4 1 g/dL     Protime-INR [[id number]]  (Normal) Collected: 06/27/23 2119    Lab Status: Final result Specimen: Blood from Arm, Right Updated: 06/27/23 2143     Protime 13 8 seconds      INR 1 04    APTT [[id number]]  (Normal) Collected: 06/27/23 2119    Lab Status: Final result Specimen: Blood from Arm, Right Updated: 06/27/23 2143     PTT 24 seconds     UA w Reflex to Microscopic w Reflex to Culture [[id number]] Collected: 06/27/23 2120    Lab Status: Final result Specimen: Urine, Clean Catch Updated: 06/27/23 2134     Color, UA Colorless     Clarity, UA Clear     Specific Gravity, UA 1 007     pH, UA 5 0     Leukocytes, UA Negative     Nitrite, UA Negative     Protein, UA Negative mg/dl      Glucose, UA Negative mg/dl      Ketones, UA Negative mg/dl      Urobilinogen, UA <2 0 mg/dl      Bilirubin, UA Negative     Occult Blood, UA Negative CBC and differential [[id number]]  (Abnormal) Collected: 06/27/23 2119    Lab Status: Final result Specimen: Blood from Arm, Right Updated: 06/27/23 2133     WBC 7 97 Thousand/uL      RBC 4 70 Million/uL      Hemoglobin 12 2 g/dL      Hematocrit 38 3 %      MCV 82 fL      MCH 26 0 pg      MCHC 31 9 g/dL      RDW 15 1 %      MPV 10 6 fL      Platelets 265 Thousands/uL      nRBC 0 /100 WBCs      Neutrophils Relative 57 %      Immat GRANS % 0 %      Lymphocytes Relative 36 %      Monocytes Relative 6 %      Eosinophils Relative 1 %      Basophils Relative 0 %      Neutrophils Absolute 4 57 Thousands/µL      Immature Grans Absolute 0 02 Thousand/uL      Lymphocytes Absolute 2 84 Thousands/µL      Monocytes Absolute 0 46 Thousand/µL      Eosinophils Absolute 0 05 Thousand/µL      Basophils Absolute 0 03 Thousands/µL                  CT abdomen pelvis with contrast    (Results Pending)         Procedures  Procedures      ED Course  ED Course as of 06/28/23 0024   Tue Jun 27, 2023   2248 Doing well, pain is improved s/p IV morphine  No new Sx  Stable, NAD  Discussed blood work results  Awaiting CT a/p report  Wed Jun 28, 2023   0017 Reporting mild breakthrough abdominal pain after urinating in the urinal, will provide a smaller, additional dose of IV morphine at this time  9138 Dispo pending CT a/p report  Pt's care signed out to Dr Ivan Phillip and Dr Alli Charles at this time  Pt aware  Medical Decision Making  Patient remained stable throughout ED course and pain was significantly improved with 7 mg IV morphine at 0 1 mg/kg    Given history of diverticulitis as well as esophageal diverticulus repair with fundoplasty, as well as presentation of left lower quadrant and right upper quadrant abdominal pain, there is concern for possible GB pathology including cholecystitis, biliary colic, choledocholithiasis, cholangitis as well as pancreatitis, acute diverticulitis with/without "complication, colitis  Upon further evaluation during ED attendings evaluation, patient reported that he had recently been down to \" trash\" prior to onset of worsening abdominal pain and flank pain  There may be a component of MSK etiology of abdominal/flank pain secondary to an acute muscular strain  CBC, BMP, hepatic function panel, lipase, coags, UA unremarkable  Patient's disposition currently pending secondary to pending CT abdomen/pelvis with contrast official report  Patient's care signed out to ED attending Dr Chiquita Painting and ED resident Dr Sonia Solomon for further management  Pt understands and agreed with plan  All questions answered  No other concerns  Amount and/or Complexity of Data Reviewed  Labs: ordered  Radiology: ordered  Risk  Prescription drug management  Disposition  Final diagnoses:   Nausea   Abdominal pain   Diarrhea     Time reflects when diagnosis was documented in both MDM as applicable and the Disposition within this note     Time User Action Codes Description Comment    6/28/2023 12:21 AM Bia Prudent Add [R11 0] Nausea     6/28/2023 12:21 AM Son-Faith, Sol Sen[place] Add [R10 9] Abdominal pain     6/28/2023 12:21 AM Bia Prudent Add [R19 7] Diarrhea       ED Disposition     None      Follow-up Information     Follow up With Specialties Details Why Contact Info Additional Information    Promise Varela MD Internal Medicine Call in 1 week As needed [address] Emergency Department Emergency Medicine Go to  As needed, If symptoms worsen she has worsening abdominal pain with new constipation or bloody stools, inability to pass gas, nausea/vomiting, high fevers or any other new or worsening concerns   [address] [facility] [address] [facility] Emergency Department, [address], [place], [place], [address] [facility] " [facility] Call  As needed [address] [phone]  Anjelica Figueroa 2015 [facility], [address] [place] [address], [phone]          Patient's Medications   Discharge Prescriptions    No medications on file     No discharge procedures on file  PDMP Review       Value Time User    PDMP Reviewed  Yes 2/9/2023 12:40 PM Clyde Belcher PA-C           ED Provider  Attending physically available and evaluated Sharri Bowling I managed the patient along with the ED Attending      Electronically Signed by         Kelly Grissom MD  06/28/23 7799

## 2023-08-11 ENCOUNTER — OFFICE VISIT (OUTPATIENT)
Dept: GASTROENTEROLOGY | Facility: AMBULARY SURGERY CENTER | Age: 48
End: 2023-08-11
Payer: MEDICARE

## 2023-08-11 ENCOUNTER — OFFICE VISIT (OUTPATIENT)
Dept: CARDIAC SURGERY | Facility: CLINIC | Age: 48
End: 2023-08-11
Payer: MEDICARE

## 2023-08-11 VITALS
BODY MASS INDEX: 35.19 KG/M2 | WEIGHT: 251.4 LBS | OXYGEN SATURATION: 98 % | SYSTOLIC BLOOD PRESSURE: 120 MMHG | HEART RATE: 83 BPM | DIASTOLIC BLOOD PRESSURE: 78 MMHG | HEIGHT: 71 IN

## 2023-08-11 VITALS
DIASTOLIC BLOOD PRESSURE: 80 MMHG | TEMPERATURE: 98.2 F | BODY MASS INDEX: 34.72 KG/M2 | OXYGEN SATURATION: 100 % | WEIGHT: 248 LBS | HEART RATE: 86 BPM | SYSTOLIC BLOOD PRESSURE: 132 MMHG | HEIGHT: 71 IN | RESPIRATION RATE: 16 BRPM

## 2023-08-11 DIAGNOSIS — Q39.6 ESOPHAGEAL DIVERTICULUM: Primary | ICD-10-CM

## 2023-08-11 DIAGNOSIS — R14.1 ABDOMINAL GAS PAIN: ICD-10-CM

## 2023-08-11 DIAGNOSIS — K21.9 GASTROESOPHAGEAL REFLUX DISEASE, UNSPECIFIED WHETHER ESOPHAGITIS PRESENT: ICD-10-CM

## 2023-08-11 DIAGNOSIS — K59.00 CONSTIPATION, UNSPECIFIED CONSTIPATION TYPE: ICD-10-CM

## 2023-08-11 PROCEDURE — 99214 OFFICE O/P EST MOD 30 MIN: CPT | Performed by: INTERNAL MEDICINE

## 2023-08-11 PROCEDURE — 99214 OFFICE O/P EST MOD 30 MIN: CPT | Performed by: PHYSICIAN ASSISTANT

## 2023-08-11 RX ORDER — VENLAFAXINE 37.5 MG/1
TABLET ORAL
COMMUNITY
Start: 2023-08-09

## 2023-08-11 RX ORDER — FAMOTIDINE 20 MG/1
TABLET, FILM COATED ORAL
COMMUNITY
Start: 2023-08-01

## 2023-08-11 RX ORDER — POLYETHYLENE GLYCOL 3350 17 G/17G
POWDER, FOR SOLUTION ORAL
COMMUNITY
Start: 2023-08-01

## 2023-08-11 RX ORDER — GABAPENTIN 300 MG/1
CAPSULE ORAL
COMMUNITY
Start: 2023-05-18

## 2023-08-11 RX ORDER — FAMOTIDINE 20 MG/1
TABLET, FILM COATED ORAL
COMMUNITY
Start: 2023-08-01 | End: 2024-07-31

## 2023-08-11 RX ORDER — SENNOSIDES 8.6 MG
CAPSULE ORAL
COMMUNITY
Start: 2023-08-01

## 2023-08-11 RX ORDER — SENNOSIDES 8.6 MG
CAPSULE ORAL
COMMUNITY
Start: 2023-08-01 | End: 2023-08-11

## 2023-08-11 RX ORDER — VALPROIC ACID 250 MG/5ML
SOLUTION ORAL
COMMUNITY
Start: 2023-08-09

## 2023-08-11 RX ORDER — SENNOSIDES A AND B 8.6 MG/1
TABLET, FILM COATED ORAL
COMMUNITY
Start: 2023-08-01 | End: 2024-07-31

## 2023-08-11 RX ORDER — IBUPROFEN 800 MG/1
TABLET ORAL
COMMUNITY
Start: 2023-08-01

## 2023-08-11 NOTE — PROGRESS NOTES
[facility]  Progress Note - Kristyn Brar 50 y.o. male MRN: [id number]    Unit/Bed#:  Encounter: [id number]    Assessment/Plan:    1. Lower abdominal pain associate with abdominal bloating and gas-likely constipated in setting of recent surgery last week and stopping fiber supplements.  -Recommend to restart on Metamucil 1 capful on daily basis. If ineffective, take MiraLAX 1-2 capfuls on daily basis. -Reintroduce high-fiber diet with at least 25 to 30 g of fiber daily basis. -Colonoscopy less than 1 year ago was notable only for diverticulosis and hemorrhoids. -If symptoms do not improve, can consider checking thyroid function, magnesium levels. Calcium levels are normal on the previous admission. 2.  Esophageal dysphagia-unclear etiology,possibly esophageal dysmotility or narrowing of the wrap, patient was seen by thoracic surgery and is scheduled for barium swallow.    -I agree with famotidine.  -Avoid the use of NSAIDs, use acetaminophen when possible. Subjective: This is a 49-year-old male with history of esophageal epiphrenic diverticulum status post robotic resection of the diverticulum, Heller's myotomy, fundoplication, recent inguinal hernia repair last week, presents for evaluation of abdominal pain, gas and constipation. Patient underwent colonoscopy in October 2022 which was notable only for diverticulosis and medium size internal hemorrhoids. Patient was recommended Anusol suppository for this. Patient reports that he has been having lower abdominal pain which is typically relieved with bowel movement. Patient reports that he has been experiencing more constipation lately. He has not been taking Metamucil which she was previously taking after his colonoscopy. He also has not been eating foods that are high in fiber. He denies any rectal bleeding. He was also seen by thoracic surgery today for symptoms of dysphagia and is pending barium swallow.   He reports that he dysphagia occurs intermittently approximately once a week to drier foods such as steak or bread. No hematemesis, coffee and emesis or melena. Objective:     Vitals: Blood pressure 120/78, pulse 83, height 5' 11" (1.803 m), weight 114 kg (251 lb 6.4 oz), SpO2 98 %. ,Body mass index is 35.06 kg/m². [unfilled]    Physical Exam:    GEN: wn/wd, NAD  HEENT: MMM, no cervical or supraclavicular LAD, anciteric  CV: RRR, no m/r/g  CHEST: CTA b/l, no w/r/r  ABD: +BS, soft, NT/ND, no hepatosplenomegaly  EXT: no c/c/e  SKIN: no rashes  NEURO: aaox3      Invasive Devices     None                         Lab, Imaging and other studies:     No visits with results within 1 Day(s) from this visit.    Latest known visit with results is:   Admission on 06/27/2023, Discharged on 06/28/2023   Component Date Value   • WBC 06/27/2023 7.97    • RBC 06/27/2023 4.70    • Hemoglobin 06/27/2023 12.2    • Hematocrit 06/27/2023 38.3    • MCV 06/27/2023 82    • MCH 06/27/2023 26.0 (L)    • MCHC 06/27/2023 31.9    • RDW 06/27/2023 15.1    • MPV 06/27/2023 10.6    • Platelets 56/84/3552 313    • nRBC 06/27/2023 0    • Neutrophils Relative 06/27/2023 57    • Immat GRANS % 06/27/2023 0    • Lymphocytes Relative 06/27/2023 36    • Monocytes Relative 06/27/2023 6    • Eosinophils Relative 06/27/2023 1    • Basophils Relative 06/27/2023 0    • Neutrophils Absolute 06/27/2023 4.57    • Immature Grans Absolute 06/27/2023 0.02    • Lymphocytes Absolute 06/27/2023 2.84    • Monocytes Absolute 06/27/2023 0.46    • Eosinophils Absolute 06/27/2023 0.05    • Basophils Absolute 06/27/2023 0.03    • Lipase 06/27/2023 45    • Protime 06/27/2023 13.8    • INR 06/27/2023 1.04    • PTT 06/27/2023 24    • Color, UA 06/27/2023 Colorless    • Clarity, UA 06/27/2023 Clear    • Specific Gravity, UA 06/27/2023 1.007    • pH, UA 06/27/2023 5.0    • Leukocytes, UA 06/27/2023 Negative    • Nitrite, UA 06/27/2023 Negative    • Protein, UA 06/27/2023 Negative • Glucose, UA 06/27/2023 Negative    • Ketones, UA 06/27/2023 Negative    • Urobilinogen, UA 06/27/2023 <2.0    • Bilirubin, UA 06/27/2023 Negative    • Occult Blood, UA 06/27/2023 Negative    • Sodium 06/27/2023 139    • Potassium 06/27/2023 4.0    • Chloride 06/27/2023 107    • CO2 06/27/2023 25    • ANION GAP 06/27/2023 7    • BUN 06/27/2023 15    • Creatinine 06/27/2023 1.23    • Glucose 06/27/2023 95    • Calcium 06/27/2023 8.9    • eGFR 06/27/2023 68    • Total Bilirubin 06/27/2023 0.40    • Bilirubin, Direct 06/27/2023 0.08    • Alkaline Phosphatase 06/27/2023 53    • AST 06/27/2023 15    • ALT 06/27/2023 15    • Total Protein 06/27/2023 7.0    • Albumin 06/27/2023 4.1          I have personally reviewed pertinent films in PACS    No current facility-administered medications for this visit.              Answers for HPI/ROS submitted by the patient on 8/4/2023  Chronicity: new  Onset: more than 1 month ago  Onset quality: gradual  Frequency: intermittently  Episode duration: 2 Hours  Progression since onset: waxing and waning  Pain location: epigastric region  Pain - numeric: 7/10  Pain quality: aching, cramping, a sensation of fullness  Radiates to: RLQ  anorexia: Yes  arthralgias: Yes  belching: No  constipation: Yes  diarrhea: No  dysuria: No  fever: No  flatus: Yes  frequency: Yes  headaches: Yes  hematochezia: No  hematuria: No  melena: No  myalgias: Yes  nausea: Yes  weight loss: Yes  vomiting: No  Aggravated by: certain positions  Relieved by: nothing  Diagnostic workup: CT scan, lower endoscopy

## 2023-08-11 NOTE — PROGRESS NOTES
Thoracic Follow-Up  Assessment/Plan:    Esophageal diverticulum  Mr July Abebe is 6 months out from a robotic resection of epiphrenic diverticulum, Heller myotomy, Jaskaran fundoplication. He is having episodes of dysphagia about once a week with foods like steak and bread. We discussed modifications including cutting his food into small pieces, chewing well, eating slowly, and having water with his meals. He is concerned about these episodes of dysphagia as they have brought on panic attacks in the past. Therefore we will proceed with a  Barium swallow for further evaluation. I will call him to discuss results and if any further evaluation is necessary. All questions were answered and he is in agreement with this plan. Diagnoses and all orders for this visit:    Esophageal diverticulum  -     FL barium swallow ROUTINE esophagus; Future    Gastroesophageal reflux disease, unspecified whether esophagitis present    Other orders  -     acetaminophen (TYLENOL) 650 mg CR tablet; TAKE 1 TABLET BY MOUTH 4 TIMES A DAY FOR 3 DAYS, THEN 4 TIMES A DAY AS NEEDED FOR 4 DAYS. -     famotidine (PEPCID) 20 mg tablet; TAKE 1 TABLET BY MOUTH TWICE A DAY FOR 3 DAYS THEN TWICE A DAY FOR 4 DAYS IF TAKING IBUPROFEN  -     gabapentin (NEURONTIN) 300 mg capsule; TAKE 1 CAPSULE IN THE MORNING AND 1 CAPSULE MID-DAY,AND TAKE 2 CAPSULES AT BEDTIME (Patient not taking: Reported on 8/11/2023)  -     ibuprofen (MOTRIN) 800 mg tablet; TAKE 1 TABLET BY MOUTH 3 TIMES A DAY FOR 3 DAYS, THEN 3 TIMES A DAY AS NEEDED FOR PAIN FOR 4 DAYS. -     polyethylene glycol (GLYCOLAX) 17 GM/SCOOP powder; TAKE 17 GM 1 TIME A DAY FOR 3 DAYS, THEN 1 TIME A DAY AS NEEDED FOR CONSTIPATION FOR 4 DAYS. (Patient not taking: Reported on 8/11/2023)  -     valproic acid (DEPAKENE) 250 MG/5ML soln  -     senna (SENOKOT) 8.6 MG tablet; Take 2 tablets by mouth 2 times a day for 3 days, then 2 times a day as needed for constipation for 4 days.  (Patient not taking: Reported on 8/11/2023)  -     venlafaxine (EFFEXOR) 37.5 mg tablet          Thoracic History    Diagnosis: Epiphrenic diverticulum  Procedure: EGD, robotic resection of epiphrenic diverticulum, Heller myotomy, Jaskaran fundoplication on February 3, 2023         Subjective:    Patient ID: Anna Sanchez is a 50 y.o. male. ECOG 0    HPI  Mr Michael Cuevas is  A 50year old male who presents about 6 months out from a robotic resection of epiphrenic diverticulum, Heller myotomy and Jaskaran fundoplication on 8/0/6177. He was last seen in our office on 3/20/2023 at which point he was doing very well and tolerating a regular diet for the  On discussion today he is continuing to have dysphagia with drier foods including steaks and bread. He has an episode about once a week where he feels food, usually meat or bread, gets stuck. He drinks water and stands/sits up and the food eventually passes. These episodes have upset him a lot in the past and led to panic attacks. His panic attacks over these episodes have gone away as it has become a more frequent event, but he still feels uneasy. He never brings the food back up. Denies regurgitation, vomiting, abdominal pain. He has baseline nausea that he's struggle with related to his prostate cancer. The following portions of the patient's history were reviewed and updated as appropriate: allergies, current medications, past family history, past medical history, past social history, past surgical history and problem list.    Review of Systems   Constitutional: Negative for chills, diaphoresis and unexpected weight change. HENT: Negative. Eyes: Negative. Respiratory: Negative for cough, shortness of breath and wheezing. Cardiovascular: Negative for chest pain and leg swelling. Gastrointestinal: Negative for abdominal pain, diarrhea and nausea. +dysphagia   Endocrine: Negative. Genitourinary: Negative. Musculoskeletal: Negative. Skin: Negative.     Allergic/Immunologic: Negative. Neurological: Negative. Hematological: Negative for adenopathy. Does not bruise/bleed easily. Psychiatric/Behavioral: Negative. All other systems reviewed and are negative. Objective:   Physical Exam  Vitals reviewed. Constitutional:       General: He is not in acute distress. Appearance: Normal appearance. He is not ill-appearing. HENT:      Head: Normocephalic. Nose: Nose normal.      Mouth/Throat:      Mouth: Mucous membranes are moist.   Eyes:      Pupils: Pupils are equal, round, and reactive to light. Cardiovascular:      Rate and Rhythm: Normal rate and regular rhythm. Heart sounds: Normal heart sounds. Pulmonary:      Effort: Pulmonary effort is normal.      Breath sounds: Normal breath sounds. No wheezing, rhonchi or rales. Abdominal:      Palpations: Abdomen is soft. Musculoskeletal:         General: No swelling. Normal range of motion. Lymphadenopathy:      Cervical: No cervical adenopathy. Skin:     General: Skin is warm. Neurological:      General: No focal deficit present. Mental Status: He is alert and oriented to person, place, and time. Psychiatric:         Mood and Affect: Mood normal.     /80 (BP Location: Left arm, Patient Position: Sitting, Cuff Size: Large)   Pulse 86   Temp 98.2 °F (36.8 °C) (Temporal)   Resp 16   Ht 5' 11" (1.803 m)   Wt 112 kg (248 lb)   SpO2 100%   BMI 34.59 kg/m²     XR chest pa & lateral    Result Date: 2/19/2023  Impression No acute cardiopulmonary disease. Workstation performed: [id number]      No CT Chest results available for this patient. No CT Chest,Abdomen,Pelvis results available for this patient. No NM PET CT results available for this patient. FL esophagram complete    Result Date: 2/4/2023  Narrative BARIUM SWALLOW-ESOPHAGRAM INDICATION:   s/p heller myotomy.  COMPARISON:  None IMAGES:  11 FLUOROSCOPY TIME:   50 seconds  TECHNIQUE: The patient was given water-soluble contrast by mouth and images of the esophagus were obtained. FINDINGS: The esophagus is normal in caliber. There is normal emptying through the GE junction. There is no evidence of leak. Impression Unremarkable postoperative appearance. Workstation performed: [id number]     FL barium swallow ROUTINE esophagus    Result Date: 11/22/2022  Narrative BARIUM SWALLOW-ESOPHAGRAM INDICATION:   Q39.6: Congenital diverticulum of esophagus. COMPARISON:  CT abdomen/pelvis 8/24/2022. EGD 10/19/2022. IMAGES:  197. FLUOROSCOPY TIME:   0.8 minutes  TECHNIQUE: The patient was given effervescent granules and barium by mouth and images of the esophagus were obtained. FINDINGS: The esophagus is normal in caliber. Esophageal motility is normal and emptying of contrast from the esophagus is prompt. No mucosal lesion, ulceration, or evidence of fold thickening is seen. Arising from the right lateral aspect of the distal esophagus just above the hemidiaphragm, is a smooth rounded outpouching with the approximate size of 2 vertebral bodies and narrow communication to the esophagus, consistent with epiphrenic diverticulum. Contrast flows freely past the diverticulum into the stomach, and also flows freely into the diverticulum. There is retention of contrast within the diverticulum, with emptying dependent on patient positioning. Gastroesophageal reflux was not observed. Impression Epiphrenic diverticulum similar to prior CT examinations.  Workstation performed: [id number]

## 2023-08-11 NOTE — ASSESSMENT & PLAN NOTE
Mr Zoey Knowles is 6 months out from a robotic resection of epiphrenic diverticulum, Heller myotomy, Jaskaran fundoplication. He is having episodes of dysphagia about once a week with foods like steak and bread. We discussed modifications including cutting his food into small pieces, chewing well, eating slowly, and having water with his meals. He is concerned about these episodes of dysphagia as they have brought on panic attacks in the past. Therefore we will proceed with a  Barium swallow for further evaluation. I will call him to discuss results and if any further evaluation is necessary. All questions were answered and he is in agreement with this plan.

## 2023-08-14 ENCOUNTER — TELEPHONE (OUTPATIENT)
Dept: CARDIAC SURGERY | Facility: CLINIC | Age: 48
End: 2023-08-14

## 2023-08-14 ENCOUNTER — TELEPHONE (OUTPATIENT)
Dept: HEMATOLOGY ONCOLOGY | Facility: CLINIC | Age: 48
End: 2023-08-14

## 2023-08-14 NOTE — TELEPHONE ENCOUNTER
I called and LVM for pt with our office number as well as the number for central scheduling so that he can get his barium swallow scheduled.

## 2023-08-14 NOTE — TELEPHONE ENCOUNTER
PT needs to have Barrium Swallow test scheduled. I left a VM with [facility] and Central scheduling for him to call back and reschedule or to call us and we will be happy to schedule for him.

## 2023-10-05 ENCOUNTER — HOSPITAL ENCOUNTER (OUTPATIENT)
Dept: RADIOLOGY | Facility: HOSPITAL | Age: 48
Discharge: HOME | End: 2023-10-05
Payer: COMMERCIAL

## 2023-10-05 DIAGNOSIS — Q39.6 ESOPHAGEAL DIVERTICULUM: ICD-10-CM

## 2023-10-05 PROCEDURE — 74220 X-RAY XM ESOPHAGUS 1CNTRST: CPT

## 2023-10-10 ENCOUNTER — OFFICE VISIT (OUTPATIENT)
Dept: CARDIAC SURGERY | Facility: CLINIC | Age: 48
End: 2023-10-10
Payer: MEDICARE

## 2023-10-10 VITALS
TEMPERATURE: 97.2 F | HEART RATE: 78 BPM | BODY MASS INDEX: 34.69 KG/M2 | OXYGEN SATURATION: 98 % | RESPIRATION RATE: 24 BRPM | WEIGHT: 247.8 LBS | HEIGHT: 71 IN | SYSTOLIC BLOOD PRESSURE: 132 MMHG | DIASTOLIC BLOOD PRESSURE: 84 MMHG

## 2023-10-10 DIAGNOSIS — R13.19 ESOPHAGEAL DYSPHAGIA: Primary | ICD-10-CM

## 2023-10-10 PROBLEM — R13.10 DYSPHAGIA: Status: ACTIVE | Noted: 2023-10-10

## 2023-10-10 PROCEDURE — 99213 OFFICE O/P EST LOW 20 MIN: CPT | Performed by: THORACIC SURGERY (CARDIOTHORACIC VASCULAR SURGERY)

## 2023-10-10 NOTE — H&P (VIEW-ONLY)
Assessment/Plan:    Dysphagia  We had a discussion with Nikki Milligan after reviewing his imaging. After discussing his symptoms, Dr. Chris Burns thinks he might be having episodes of dumping syndrome. We recommended avoiding simple carbohydrates and not drink anything within 30 min of eating. He should increased his complex carbohydrates and protein. Hopefully, this will improve over time, along with his diet changes. We can perform an EGD for further evaluation. He is in agreement with the plan. We will schedule this for the next couple of weeks. Consent was signed. Labs are unnecessary. Diagnoses and all orders for this visit:    Esophageal dysphagia  -     Case request operating room: ESOPHAGOGASTRODUODENOSCOPY (EGD); Standing  -     Case request operating room: ESOPHAGOGASTRODUODENOSCOPY (EGD)    Other orders  -     Diet NPO; Sips with meds; Standing  -     Void on call to OR; Standing  -     Insert peripheral IV; Standing  -     Place sequential compression device; Standing          Thoracic History      Diagnosis: Epiphrenic diverticulum  Procedure: EGD, robotic resection of epiphrenic diverticulum, Heller myotomy, Jaskaran fundoplication on February 3, 2023  Cancer Staging   No matching staging information was found for the patient. Oncology History    No history exists. Patient ID: Lyly Bauman is a 50 y.o. male. ECOG 0     HPI     Nikki Pedrozagila is a 49 yo gentleman who underwent an EGD, robotic resection of epiphrenic diverticulum, heller myotomy, and Jaskaran fundoplication on 1/1/17. He was last seen on 8/11/23 at which point he was having problems with meats and breads, because he feels it get stuck. These episodes have led to panic attacks. Therefore, we recommended a barium swallow. He returns today after having a barium swallow on 10/5/23, which demonstrated no esophageal narrowing and no reflux observed. On discussion, he is still having issues swallowing, but it is improving.  He is also having intermittent abdominal pain, for which he went to the ED on 9/30/23. However, he just left because his symptoms resolved. However, he continues to have abdominal pain about 1x month. He is also able to get food down, with only rare occasions of dysphagia. He is very gassy. He is also having diarrhea daily. The following portions of the patient's history were reviewed and updated as appropriate: allergies, current medications, past family history, past medical history, past social history, past surgical history and problem list.      Past Medical History:   Diagnosis Date   • Anxiety    • Arthritis     "hips and knees"   • Asthma     per pt "only if has a cold"   • Bipolar disorder ([address])    • Blood in the stool     per pt "last time had this 2 mths ago or so"   • Cancer [facility]) 2014    prostate   • Chronic pain disorder     per pt "from the Lupron especially lower body"   • Colon polyp    • Cracked tooth     lower back right side tooth   • Depression    • Diverticulosis    • Exercises 3 to 4 times per week     treadmill 30 min--light lifting   • GERD (gastroesophageal reflux disease)    • History of psychiatric treatment    • History of therapeutic radiation 2015    x 36 rounds   • Hyperlipidemia    • Hypertension    • Teeth missing    • Wears glasses      Past Surgical History:   Procedure Laterality Date   • COLONOSCOPY     • FRACTURE SURGERY Left     wrist   • INGUINAL HERNIA REPAIR  08/01/2023   • PENILE PROSTHESIS IMPLANT     • CT ESOPHAGOGASTRODUODENOSCOPY TRANSORAL DIAGNOSTIC N/A 02/03/2023    Procedure: ESOPHAGOGASTRODUODENOSCOPY (EGD);   Surgeon: Haley Pruett MD;  Location: [facility];  Service: Thoracic   • CT LAPS ESOPHAGOMYOTOMY W/FUNDOPLASTY IF PERFORMED N/A 02/03/2023    Procedure: robotic assisted laparoscopic esophageal myotomy w/ partial fundoplication;  Surgeon: Haely Pruett MD;  Location: [facility];  Service: Thoracic   • PROSTATECTOMY  2014   • UPPER GASTROINTESTINAL ENDOSCOPY     • US GUIDED FINE NEEDLE ASPIRATION (ALL INC)  12/16/2014     Family History   Problem Relation Age of Onset   • Cancer Other    • Hypertension Other    • Hyperlipidemia Other      Social History     Socioeconomic History   • Marital status: /Civil Union     Spouse name: Not on file   • Number of children: Not on file   • Years of education: Not on file   • Highest education level: Not on file   Occupational History   • Not on file   Tobacco Use   • Smoking status: Never   • Smokeless tobacco: Never   Vaping Use   • Vaping Use: Never used   Substance and Sexual Activity   • Alcohol use: Not Currently   • Drug use: Never   • Sexual activity: Not on file     Comment: defer   Other Topics Concern   • Not on file   Social History Narrative   • Not on file     Social Determinants of Health     Financial Resource Strain: Not on file   Food Insecurity: Not on file   Transportation Needs: Not on file   Physical Activity: Not on file   Stress: Not on file   Social Connections: Not on file   Intimate Partner Violence: Not on file   Housing Stability: Not on file     Allergies   Allergen Reactions   • Amoxicillin Diarrhea and Other (See Comments)     Current Outpatient Medications on File Prior to Visit   Medication Sig Dispense Refill   • acetaminophen (TYLENOL) 650 mg CR tablet TAKE 1 TABLET BY MOUTH 4 TIMES A DAY FOR 3 DAYS, THEN 4 TIMES A DAY AS NEEDED FOR 4 DAYS.      • albuterol (PROVENTIL HFA,VENTOLIN HFA) 90 mcg/act inhaler INHALE 2 PUFFS BY MOUTH EVERY 6 HOURS AS NEEDED FOR WHEEZE     • amLODIPine (NORVASC) 10 mg tablet Take 10 mg by mouth every morning     • atorvastatin (LIPITOR) 20 mg tablet Take 20 mg by mouth daily at bedtime     • divalproex sodium (DEPAKOTE ER) 500 mg 24 hr tablet 2 tab po qam and 2 tabs po qhs     • docusate sodium (COLACE) 100 mg capsule Take 1 capsule (100 mg total) by mouth 2 (two) times a day 20 capsule 0   • gabapentin (NEURONTIN) 300 mg capsule      • ibuprofen (MOTRIN) 800 mg tablet      • leuprolide (LUPRON DEPOT 3 MONTH KIT) 22.5 mg injection Inject 22.5 mg into a muscle every 3 (three) months Per pt next dose end of 2/2023     • lisinopril (ZESTRIL) 40 mg tablet Take 40 mg by mouth every morning     • methylphenidate (RITALIN) 5 mg tablet if needed     • prazosin (MINIPRESS) 1 mg capsule 1 tab po qhs x 1 week then 2 tabs qhs     • prochlorperazine (COMPAZINE) 10 mg tablet TAKE 1 TABLET EVERY 6 HOURS X 30 DAYS AS NEEDED FOR NAUSEA AND VOMITING     • senna (SENOKOT) 8.6 MG tablet      • valproic acid (DEPAKENE) 250 MG/5ML soln      • venlafaxine (EFFEXOR) 37.5 mg tablet      • famotidine (PEPCID) 20 mg tablet TAKE 1 TABLET BY MOUTH TWICE A DAY FOR 3 DAYS THEN TWICE A DAY FOR 4 DAYS IF TAKING IBUPROFEN (Patient not taking: Reported on 10/10/2023)     • famotidine (PEPCID) 20 mg tablet      • ibuprofen (MOTRIN) 600 mg tablet Take 1 tablet (600 mg total) by mouth 3 (three) times a day with meals for 5 days 30 tablet 0   • oxyCODONE (Roxicodone) 5 immediate release tablet Take 1 tablet (5 mg total) by mouth every 6 (six) hours as needed for severe pain Max Daily Amount: 20 mg 15 tablet 0   • polyethylene glycol (GLYCOLAX) 17 GM/SCOOP powder        No current facility-administered medications on file prior to visit. Review of Systems   Constitutional: Negative for activity change, appetite change, chills, fatigue and fever. HENT: Positive for trouble swallowing. Negative for postnasal drip, sore throat and voice change. Eyes: Negative for visual disturbance. Respiratory: Negative for cough, chest tightness, shortness of breath and wheezing. Cardiovascular: Negative for chest pain and leg swelling. Gastrointestinal: Positive for abdominal pain and diarrhea. Negative for constipation, nausea and vomiting. Increased flatus     Musculoskeletal: Negative for arthralgias and myalgias. Skin: Negative for color change.    Neurological: Negative for dizziness, syncope, weakness, light-headedness and headaches. Hematological: Negative for adenopathy. Psychiatric/Behavioral: Negative for agitation and behavioral problems. The patient is not nervous/anxious. Objective:   Physical Exam  Vitals reviewed. Constitutional:       General: He is not in acute distress. Appearance: Normal appearance. He is well-developed. He is not diaphoretic. HENT:      Head:      Comments: Hat on   Eyes:      General: No scleral icterus. Extraocular Movements: Extraocular movements intact. Neck:      Trachea: No tracheal deviation. Cardiovascular:      Rate and Rhythm: Normal rate and regular rhythm. Pulses: Normal pulses. Heart sounds: Normal heart sounds. No murmur heard. Pulmonary:      Effort: Pulmonary effort is normal. No respiratory distress. Breath sounds: Normal breath sounds. No wheezing. Abdominal:      General: Bowel sounds are normal. There is no distension. Palpations: Abdomen is soft. Tenderness: There is no abdominal tenderness. Musculoskeletal:         General: Normal range of motion. Cervical back: Normal range of motion and neck supple. Right lower leg: No edema. Left lower leg: No edema. Skin:     General: Skin is warm and dry. Neurological:      Mental Status: He is alert and oriented to person, place, and time. Cranial Nerves: No cranial nerve deficit. Psychiatric:         Mood and Affect: Mood normal.         Behavior: Behavior normal.         Thought Content: Thought content normal.     /84   Pulse 78   Temp (!) 97.2 °F (36.2 °C)   Resp (!) 24   Ht 5' 11" (1.803 m)   Wt 112 kg (247 lb 12.8 oz)   SpO2 98%   BMI 34.56 kg/m²     Result Date: 10/5/2023  Narrative BARIUM SWALLOW-ESOPHAGRAM INDICATION:   Q39.6: Congenital diverticulum of esophagus.  History of esophageal diverticular repair; Heller myotomy; DAVY fundoplication; patient with dysphagia to solid foods has developed since the surgery and not improved COMPARISON: Barium swallow from 2/4/2023 and 11/22/2022; CT abdomen from 6/27/2023 IMAGES: 4 with additional screen saves FLUOROSCOPY TIME:   2.1 minutes TECHNIQUE: The patient was given thin barium by mouth and images of the esophagus were obtained. FINDINGS: No clips are seen on  projection. The cervical esophagus is unremarkable without evidence of plaques. There is prompt emptying of the proximal thoracic esophagus to the level of the GE junction where there is mild narrowing which intermittently distends without significant delay. As compared to the immediate postoperative study there is more distention of the distal esophagus with a short segment of narrowing prior to the GE junction. No stricture is seen. Prone imaging there is protrusion of the gastric lumen beyond the diaphragm adjacent to the esophagus which becomes larger than the original supine studies. Some air contrast images also show this to be persistent. This probably represents some unwrapping of the patient's DAVY fundoplication showing patency with the remainder of the gastric lumen. This consequently has the appearance of a paraesophageal hernia. The esophageal/gastric junction still appears to be at the esophageal hiatus without significant sliding component. No significant reflux was seen during the course of the examination. Impression Paraesophageal gastric lumen most likely represents partially unwrapped fundoplication. No esophageal narrowing is seen elsewhere. No reflux was observed during the course examination.  . Workstation performed: [id number]

## 2023-10-10 NOTE — ASSESSMENT & PLAN NOTE
We had a discussion with Dianna Osborne after reviewing his imaging. After discussing his symptoms, Dr. Keegan Luis thinks he might be having episodes of dumping syndrome. We recommended avoiding simple carbohydrates and not drink anything within 30 min of eating. He should increased his complex carbohydrates and protein. Hopefully, this will improve over time, along with his diet changes. We can perform an EGD for further evaluation. He is in agreement with the plan. We will schedule this for the next couple of weeks. Consent was signed. Labs are unnecessary.

## 2023-10-10 NOTE — PROGRESS NOTES
Assessment/Plan:    Dysphagia  We had a discussion with Barrington Jacques after reviewing his imaging. After discussing his symptoms, Dr. Scott Mejia thinks he might be having episodes of dumping syndrome. We recommended avoiding simple carbohydrates and not drink anything within 30 min of eating. He should increased his complex carbohydrates and protein. Hopefully, this will improve over time, along with his diet changes. We can perform an EGD for further evaluation. He is in agreement with the plan. We will schedule this for the next couple of weeks. Consent was signed. Labs are unnecessary. Diagnoses and all orders for this visit:    Esophageal dysphagia  -     Case request operating room: ESOPHAGOGASTRODUODENOSCOPY (EGD); Standing  -     Case request operating room: ESOPHAGOGASTRODUODENOSCOPY (EGD)    Other orders  -     Diet NPO; Sips with meds; Standing  -     Void on call to OR; Standing  -     Insert peripheral IV; Standing  -     Place sequential compression device; Standing          Thoracic History      Diagnosis: Epiphrenic diverticulum  Procedure: EGD, robotic resection of epiphrenic diverticulum, Heller myotomy, Jaskaran fundoplication on February 3, 2023  Cancer Staging   No matching staging information was found for the patient. Oncology History    No history exists. Patient ID: Eve Jolly is a 50 y.o. male. ECOG 0     HPI     Barrington Jacques is a 49 yo gentleman who underwent an EGD, robotic resection of epiphrenic diverticulum, heller myotomy, and Jaskaran fundoplication on 8/0/66. He was last seen on 8/11/23 at which point he was having problems with meats and breads, because he feels it get stuck. These episodes have led to panic attacks. Therefore, we recommended a barium swallow. He returns today after having a barium swallow on 10/5/23, which demonstrated no esophageal narrowing and no reflux observed. On discussion, he is still having issues swallowing, but it is improving.  He is also having intermittent abdominal pain, for which he went to the ED on 9/30/23. However, he just left because his symptoms resolved. However, he continues to have abdominal pain about 1x month. He is also able to get food down, with only rare occasions of dysphagia. He is very gassy. He is also having diarrhea daily. The following portions of the patient's history were reviewed and updated as appropriate: allergies, current medications, past family history, past medical history, past social history, past surgical history and problem list.      Past Medical History:   Diagnosis Date   • Anxiety    • Arthritis     "hips and knees"   • Asthma     per pt "only if has a cold"   • Bipolar disorder ([address])    • Blood in the stool     per pt "last time had this 2 mths ago or so"   • Cancer [facility]) 2014    prostate   • Chronic pain disorder     per pt "from the Lupron especially lower body"   • Colon polyp    • Cracked tooth     lower back right side tooth   • Depression    • Diverticulosis    • Exercises 3 to 4 times per week     treadmill 30 min--light lifting   • GERD (gastroesophageal reflux disease)    • History of psychiatric treatment    • History of therapeutic radiation 2015    x 36 rounds   • Hyperlipidemia    • Hypertension    • Teeth missing    • Wears glasses      Past Surgical History:   Procedure Laterality Date   • COLONOSCOPY     • FRACTURE SURGERY Left     wrist   • INGUINAL HERNIA REPAIR  08/01/2023   • PENILE PROSTHESIS IMPLANT     • OH ESOPHAGOGASTRODUODENOSCOPY TRANSORAL DIAGNOSTIC N/A 02/03/2023    Procedure: ESOPHAGOGASTRODUODENOSCOPY (EGD);   Surgeon: Antoinette Small MD;  [facility]: [facility];  Service: Thoracic   • OH LAPS ESOPHAGOMYOTOMY W/FUNDOPLASTY IF PERFORMED N/A 02/03/2023    Procedure: robotic assisted laparoscopic esophageal myotomy w/ partial fundoplication;  Surgeon: Antoinette Small MD;  Location: [facility];  Service: Thoracic   • PROSTATECTOMY  2014   • UPPER GASTROINTESTINAL ENDOSCOPY     • US GUIDED FINE NEEDLE ASPIRATION (ALL INC)  12/16/2014     Family History   Problem Relation Age of Onset   • Cancer Other    • Hypertension Other    • Hyperlipidemia Other      Social History     Socioeconomic History   • Marital status: /Civil Union     Spouse name: Not on file   • Number of children: Not on file   • Years of education: Not on file   • Highest education level: Not on file   Occupational History   • Not on file   Tobacco Use   • Smoking status: Never   • Smokeless tobacco: Never   Vaping Use   • Vaping Use: Never used   Substance and Sexual Activity   • Alcohol use: Not Currently   • Drug use: Never   • Sexual activity: Not on file     Comment: defer   Other Topics Concern   • Not on file   Social History Narrative   • Not on file     Social Determinants of Health     Financial Resource Strain: Not on file   Food Insecurity: Not on file   Transportation Needs: Not on file   Physical Activity: Not on file   Stress: Not on file   Social Connections: Not on file   Intimate Partner Violence: Not on file   Housing Stability: Not on file     Allergies   Allergen Reactions   • Amoxicillin Diarrhea and Other (See Comments)     Current Outpatient Medications on File Prior to Visit   Medication Sig Dispense Refill   • acetaminophen (TYLENOL) 650 mg CR tablet TAKE 1 TABLET BY MOUTH 4 TIMES A DAY FOR 3 DAYS, THEN 4 TIMES A DAY AS NEEDED FOR 4 DAYS.      • albuterol (PROVENTIL HFA,VENTOLIN HFA) 90 mcg/act inhaler INHALE 2 PUFFS BY MOUTH EVERY 6 HOURS AS NEEDED FOR WHEEZE     • amLODIPine (NORVASC) 10 mg tablet Take 10 mg by mouth every morning     • atorvastatin (LIPITOR) 20 mg tablet Take 20 mg by mouth daily at bedtime     • divalproex sodium (DEPAKOTE ER) 500 mg 24 hr tablet 2 tab po qam and 2 tabs po qhs     • docusate sodium (COLACE) 100 mg capsule Take 1 capsule (100 mg total) by mouth 2 (two) times a day 20 capsule 0   • gabapentin (NEURONTIN) 300 mg capsule      • ibuprofen (MOTRIN) 800 mg tablet      • leuprolide (LUPRON DEPOT 3 MONTH KIT) 22.5 mg injection Inject 22.5 mg into a muscle every 3 (three) months Per pt next dose end of 2/2023     • lisinopril (ZESTRIL) 40 mg tablet Take 40 mg by mouth every morning     • methylphenidate (RITALIN) 5 mg tablet if needed     • prazosin (MINIPRESS) 1 mg capsule 1 tab po qhs x 1 week then 2 tabs qhs     • prochlorperazine (COMPAZINE) 10 mg tablet TAKE 1 TABLET EVERY 6 HOURS X 30 DAYS AS NEEDED FOR NAUSEA AND VOMITING     • senna (SENOKOT) 8.6 MG tablet      • valproic acid (DEPAKENE) 250 MG/5ML soln      • venlafaxine (EFFEXOR) 37.5 mg tablet      • famotidine (PEPCID) 20 mg tablet TAKE 1 TABLET BY MOUTH TWICE A DAY FOR 3 DAYS THEN TWICE A DAY FOR 4 DAYS IF TAKING IBUPROFEN (Patient not taking: Reported on 10/10/2023)     • famotidine (PEPCID) 20 mg tablet      • ibuprofen (MOTRIN) 600 mg tablet Take 1 tablet (600 mg total) by mouth 3 (three) times a day with meals for 5 days 30 tablet 0   • oxyCODONE (Roxicodone) 5 immediate release tablet Take 1 tablet (5 mg total) by mouth every 6 (six) hours as needed for severe pain Max Daily Amount: 20 mg 15 tablet 0   • polyethylene glycol (GLYCOLAX) 17 GM/SCOOP powder        No current facility-administered medications on file prior to visit. Review of Systems   Constitutional: Negative for activity change, appetite change, chills, fatigue and fever. HENT: Positive for trouble swallowing. Negative for postnasal drip, sore throat and voice change. Eyes: Negative for visual disturbance. Respiratory: Negative for cough, chest tightness, shortness of breath and wheezing. Cardiovascular: Negative for chest pain and leg swelling. Gastrointestinal: Positive for abdominal pain and diarrhea. Negative for constipation, nausea and vomiting. Increased flatus     Musculoskeletal: Negative for arthralgias and myalgias. Skin: Negative for color change.    Neurological: Negative for dizziness, syncope, weakness, light-headedness and headaches. Hematological: Negative for adenopathy. Psychiatric/Behavioral: Negative for agitation and behavioral problems. The patient is not nervous/anxious. Objective:   Physical Exam  Vitals reviewed. Constitutional:       General: He is not in acute distress. Appearance: Normal appearance. He is well-developed. He is not diaphoretic. HENT:      Head:      Comments: Hat on   Eyes:      General: No scleral icterus. Extraocular Movements: Extraocular movements intact. Neck:      Trachea: No tracheal deviation. Cardiovascular:      Rate and Rhythm: Normal rate and regular rhythm. Pulses: Normal pulses. Heart sounds: Normal heart sounds. No murmur heard. Pulmonary:      Effort: Pulmonary effort is normal. No respiratory distress. Breath sounds: Normal breath sounds. No wheezing. Abdominal:      General: Bowel sounds are normal. There is no distension. Palpations: Abdomen is soft. Tenderness: There is no abdominal tenderness. Musculoskeletal:         General: Normal range of motion. Cervical back: Normal range of motion and neck supple. Right lower leg: No edema. Left lower leg: No edema. Skin:     General: Skin is warm and dry. Neurological:      Mental Status: He is alert and oriented to person, place, and time. Cranial Nerves: No cranial nerve deficit. Psychiatric:         Mood and Affect: Mood normal.         Behavior: Behavior normal.         Thought Content: Thought content normal.     /84   Pulse 78   Temp (!) 97.2 °F (36.2 °C)   Resp (!) 24   Ht 5' 11" (1.803 m)   Wt 112 kg (247 lb 12.8 oz)   SpO2 98%   BMI 34.56 kg/m²     Result Date: 10/5/2023  Narrative BARIUM SWALLOW-ESOPHAGRAM INDICATION:   Q39.6: Congenital diverticulum of esophagus.  History of esophageal diverticular repair; Heller myotomy; DAVY fundoplication; patient with dysphagia to solid foods has developed since the surgery and not improved COMPARISON: Barium swallow from 2/4/2023 and 11/22/2022; CT abdomen from 6/27/2023 IMAGES: 4 with additional screen saves FLUOROSCOPY TIME:   2.1 minutes TECHNIQUE: The patient was given thin barium by mouth and images of the esophagus were obtained. FINDINGS: No clips are seen on  projection. The cervical esophagus is unremarkable without evidence of plaques. There is prompt emptying of the proximal thoracic esophagus to the level of the GE junction where there is mild narrowing which intermittently distends without significant delay. As compared to the immediate postoperative study there is more distention of the distal esophagus with a short segment of narrowing prior to the GE junction. No stricture is seen. Prone imaging there is protrusion of the gastric lumen beyond the diaphragm adjacent to the esophagus which becomes larger than the original supine studies. Some air contrast images also show this to be persistent. This probably represents some unwrapping of the patient's DAVY fundoplication showing patency with the remainder of the gastric lumen. This consequently has the appearance of a paraesophageal hernia. The esophageal/gastric junction still appears to be at the esophageal hiatus without significant sliding component. No significant reflux was seen during the course of the examination. Impression Paraesophageal gastric lumen most likely represents partially unwrapped fundoplication. No esophageal narrowing is seen elsewhere. No reflux was observed during the course examination.  . Workstation performed: [id number]

## 2023-10-24 NOTE — PRE-PROCEDURE INSTRUCTIONS
Pre-Surgery Instructions:   Medication Instructions    acetaminophen (TYLENOL) 650 mg CR tablet Uses PRN- OK to take day of surgery    albuterol (PROVENTIL HFA,VENTOLIN HFA) 90 mcg/act inhaler Uses PRN- OK to take day of surgery    amLODIPine (NORVASC) 10 mg tablet Take day of surgery. atorvastatin (LIPITOR) 20 mg tablet Take night before surgery    divalproex sodium (DEPAKOTE ER) 500 mg 24 hr tablet Take day of surgery. famotidine (PEPCID) 20 mg tablet Take day of surgery. gabapentin (NEURONTIN) 300 mg capsule Take day of surgery. ibuprofen (MOTRIN) 800 mg tablet Stop taking 7 days prior to surgery. leuprolide (LUPRON DEPOT 3 MONTH KIT) 22.5 mg injection Q 6 months    lisinopril (ZESTRIL) 40 mg tablet Hold day of surgery. methylphenidate (RITALIN) 5 mg tablet Hold day of surgery. prazosin (MINIPRESS) 1 mg capsule Take night before surgery    prochlorperazine (COMPAZINE) 10 mg tablet Uses PRN- OK to take day of surgery    senna (SENOKOT) 8.6 MG tablet Uses PRN- DO NOT take day of surgery    valproic acid (DEPAKENE) 250 MG/5ML soln Take day of surgery. venlafaxine (EFFEXOR) 37.5 mg tablet Take day of surgery. Medication instructions for day surgery reviewed. Please use only a sip of water to take your instructed medications. Avoid all over the counter vitamins, supplements and NSAIDS for one week prior to surgery per anesthesia guidelines. Tylenol is ok to take as needed. You will receive a call one business day prior to surgery with an arrival time and hospital directions. If your surgery is scheduled on a Monday, the hospital will be calling you on the Friday prior to your surgery. If you have not heard from anyone by 8pm, please call the hospital supervisor through the hospital  at [phone]. Do not eat or drink anything after midnight the night before your surgery, including candy, mints, lifesavers, or chewing gum. Do not drink alcohol 24hrs before your surgery.  Try not to smoke at least 24hrs before your surgery. Follow the pre surgery showering instructions as listed in the [facility] Surgical Experience Booklet” or otherwise provided by your surgeon's office. Do not shave the surgical area 24 hours before surgery. Do not apply any lotions, creams, including makeup, cologne, deodorant, or perfumes after showering on the day of your surgery. No contact lenses, eye make-up, or artificial eyelashes. Remove nail polish, including gel polish, and any artificial, gel, or acrylic nails if possible. Remove all jewelry including rings and body piercing jewelry. Wear causal clothing that is easy to take on and off. Consider your type of surgery. Keep any valuables, jewelry, piercings at home. Please bring any specially ordered equipment (sling, braces) if indicated. Arrange for a responsible person to drive you to and from the hospital on the day of your surgery. Visitor Guidelines discussed. Call the surgeon's office with any new illnesses, exposures, or additional questions prior to surgery. Please reference your [facility] Surgical Experience Booklet” for additional information to prepare for your upcoming surgery.

## 2023-11-02 ENCOUNTER — HOSPITAL ENCOUNTER (OUTPATIENT)
Facility: HOSPITAL | Age: 48
Setting detail: OUTPATIENT SURGERY
Discharge: HOME | End: 2023-11-02
Attending: THORACIC SURGERY (CARDIOTHORACIC VASCULAR SURGERY) | Admitting: THORACIC SURGERY (CARDIOTHORACIC VASCULAR SURGERY)
Payer: COMMERCIAL

## 2023-11-02 ENCOUNTER — ANESTHESIA (OUTPATIENT)
Dept: PERIOP | Facility: HOSPITAL | Age: 48
End: 2023-11-02
Payer: COMMERCIAL

## 2023-11-02 ENCOUNTER — ANESTHESIA EVENT (OUTPATIENT)
Dept: PERIOP | Facility: HOSPITAL | Age: 48
End: 2023-11-02
Payer: COMMERCIAL

## 2023-11-02 VITALS
OXYGEN SATURATION: 98 % | HEART RATE: 56 BPM | WEIGHT: 242 LBS | HEIGHT: 71 IN | SYSTOLIC BLOOD PRESSURE: 125 MMHG | RESPIRATION RATE: 16 BRPM | BODY MASS INDEX: 33.88 KG/M2 | DIASTOLIC BLOOD PRESSURE: 74 MMHG | TEMPERATURE: 96.3 F

## 2023-11-02 PROCEDURE — 43235 EGD DIAGNOSTIC BRUSH WASH: CPT | Performed by: THORACIC SURGERY (CARDIOTHORACIC VASCULAR SURGERY)

## 2023-11-02 RX ORDER — DIPHENHYDRAMINE HYDROCHLORIDE 50 MG/ML
12.5 INJECTION INTRAMUSCULAR; INTRAVENOUS ONCE AS NEEDED
Status: DISCONTINUED | OUTPATIENT
Start: 2023-11-02 | End: 2023-11-02 | Stop reason: HOSPADM

## 2023-11-02 RX ORDER — DEXAMETHASONE SODIUM PHOSPHATE 10 MG/ML
INJECTION, SOLUTION INTRAMUSCULAR; INTRAVENOUS AS NEEDED
Status: DISCONTINUED | OUTPATIENT
Start: 2023-11-02 | End: 2023-11-02

## 2023-11-02 RX ORDER — SUCCINYLCHOLINE/SOD CL,ISO/PF 100 MG/5ML
SYRINGE (ML) INTRAVENOUS AS NEEDED
Status: DISCONTINUED | OUTPATIENT
Start: 2023-11-02 | End: 2023-11-02

## 2023-11-02 RX ORDER — ONDANSETRON 2 MG/ML
INJECTION INTRAMUSCULAR; INTRAVENOUS AS NEEDED
Status: DISCONTINUED | OUTPATIENT
Start: 2023-11-02 | End: 2023-11-02

## 2023-11-02 RX ORDER — LIDOCAINE HYDROCHLORIDE 10 MG/ML
INJECTION, SOLUTION EPIDURAL; INFILTRATION; INTRACAUDAL; PERINEURAL AS NEEDED
Status: DISCONTINUED | OUTPATIENT
Start: 2023-11-02 | End: 2023-11-02

## 2023-11-02 RX ORDER — PROPOFOL 10 MG/ML
INJECTION, EMULSION INTRAVENOUS AS NEEDED
Status: DISCONTINUED | OUTPATIENT
Start: 2023-11-02 | End: 2023-11-02

## 2023-11-02 RX ORDER — ONDANSETRON 2 MG/ML
4 INJECTION INTRAMUSCULAR; INTRAVENOUS ONCE AS NEEDED
Status: DISCONTINUED | OUTPATIENT
Start: 2023-11-02 | End: 2023-11-02 | Stop reason: HOSPADM

## 2023-11-02 RX ORDER — HYDROMORPHONE HCL/PF 1 MG/ML
0.5 SYRINGE (ML) INJECTION
Status: DISCONTINUED | OUTPATIENT
Start: 2023-11-02 | End: 2023-11-02 | Stop reason: HOSPADM

## 2023-11-02 RX ORDER — FENTANYL CITRATE 50 UG/ML
INJECTION, SOLUTION INTRAMUSCULAR; INTRAVENOUS AS NEEDED
Status: DISCONTINUED | OUTPATIENT
Start: 2023-11-02 | End: 2023-11-02

## 2023-11-02 RX ORDER — ONDANSETRON 2 MG/ML
4 INJECTION INTRAMUSCULAR; INTRAVENOUS EVERY 6 HOURS PRN
Status: DISCONTINUED | OUTPATIENT
Start: 2023-11-02 | End: 2023-11-02 | Stop reason: HOSPADM

## 2023-11-02 RX ORDER — SODIUM CHLORIDE, SODIUM LACTATE, POTASSIUM CHLORIDE, CALCIUM CHLORIDE 600; 310; 30; 20 MG/100ML; MG/100ML; MG/100ML; MG/100ML
INJECTION, SOLUTION INTRAVENOUS CONTINUOUS PRN
Status: DISCONTINUED | OUTPATIENT
Start: 2023-11-02 | End: 2023-11-02

## 2023-11-02 RX ORDER — MIDAZOLAM HYDROCHLORIDE 2 MG/2ML
INJECTION, SOLUTION INTRAMUSCULAR; INTRAVENOUS AS NEEDED
Status: DISCONTINUED | OUTPATIENT
Start: 2023-11-02 | End: 2023-11-02

## 2023-11-02 RX ORDER — FENTANYL CITRATE/PF 50 MCG/ML
25 SYRINGE (ML) INJECTION
Status: DISCONTINUED | OUTPATIENT
Start: 2023-11-02 | End: 2023-11-02 | Stop reason: HOSPADM

## 2023-11-02 RX ADMIN — Medication 100 MG: at 08:09

## 2023-11-02 RX ADMIN — PROPOFOL 200 MG: 10 INJECTION, EMULSION INTRAVENOUS at 08:08

## 2023-11-02 RX ADMIN — FENTANYL CITRATE 100 MCG: 50 INJECTION, SOLUTION INTRAMUSCULAR; INTRAVENOUS at 08:08

## 2023-11-02 RX ADMIN — LIDOCAINE HYDROCHLORIDE 50 MG: 10 INJECTION, SOLUTION EPIDURAL; INFILTRATION; INTRACAUDAL; PERINEURAL at 08:08

## 2023-11-02 RX ADMIN — DEXAMETHASONE SODIUM PHOSPHATE 10 MG: 10 INJECTION, SOLUTION INTRAMUSCULAR; INTRAVENOUS at 08:19

## 2023-11-02 RX ADMIN — SODIUM CHLORIDE, SODIUM LACTATE, POTASSIUM CHLORIDE, AND CALCIUM CHLORIDE: .6; .31; .03; .02 INJECTION, SOLUTION INTRAVENOUS at 08:00

## 2023-11-02 RX ADMIN — MIDAZOLAM HYDROCHLORIDE 2 MG: 2 INJECTION, SOLUTION INTRAMUSCULAR; INTRAVENOUS at 08:00

## 2023-11-02 RX ADMIN — ONDANSETRON 4 MG: 2 INJECTION INTRAMUSCULAR; INTRAVENOUS at 08:32

## 2023-11-02 NOTE — ANESTHESIA PREPROCEDURE EVALUATION
Procedure:  ESOPHAGOGASTRODUODENOSCOPY (EGD) (Esophagus)    Relevant Problems   ANESTHESIA (within normal limits)      CARDIO   (+) HTN (hypertension)      GI/HEPATIC   (+) Dysphagia   (+) Gastroesophageal reflux disease   (+) Lower GI bleed      NEURO/PSYCH   (+) Chronic pain after cancer treatment   (+) Depression      PULMONARY   (+) Asthma        Physical Exam    Airway    Mallampati score: III  TM Distance: >3 FB  Neck ROM: full     Dental   No notable dental hx     Cardiovascular      Pulmonary      Other Findings        Anesthesia Plan  ASA Score- 2     Anesthesia Type- IV sedation with anesthesia with ASA Monitors. Additional Monitors:     Airway Plan:            Plan Factors-Exercise tolerance (METS): >4 METS. Chart reviewed. EKG reviewed. Existing labs reviewed. Patient summary reviewed. Patient is not a current smoker. Obstructive sleep apnea risk education given perioperatively. Induction- intravenous. Postoperative Plan- Plan for postoperative opioid use. Informed Consent- Anesthetic plan and risks discussed with patient. I personally reviewed this patient with the CRNA. Discussed and agreed on the Anesthesia Plan with the CRNA. Alexus Lopez

## 2023-11-02 NOTE — ANESTHESIA POSTPROCEDURE EVALUATION
Post-Op Assessment Note    CV Status:  Stable  Pain Score: 2    Pain management: adequate     Mental Status:  Alert and awake   Hydration Status:  Euvolemic   PONV Controlled:  Controlled   Airway Patency:  Patent      Post Op Vitals Reviewed: Yes      Staff: CRNA         No notable events documented.     BP   134/73   Temp   97.6   Pulse  79   Resp   14   SpO2   100% ra

## 2023-11-02 NOTE — OP NOTE
OPERATIVE REPORT  PATIENT NAME: Isidoro Burns    :  1975  MRN: [id number]  Pt Location: [facility] OR ROOM 08    SURGERY DATE: 2023    Surgeon(s) and Role:     * Lilian Mcgrath MD - Primary     * Thu Hein MD - Assisting    Preop Diagnosis:  Esophageal dysphagia [R13.19]    Post-Op Diagnosis Codes:     * Esophageal dysphagia [R13.19]    Procedure(s):  ESOPHAGOGASTRODUODENOSCOPY (EGD)    Specimen(s):  * No specimens in log *    Estimated Blood Loss:   Minimal    Drains:  * No LDAs found *    Anesthesia Type:   General    Operative Indications:  Esophageal dysphagia [R13.19]  Mr. Karly Bettencourt has a history of a robotic resection of a epiphrenic diverticulum, Heller myotomy, and Jaskaran fundoplication and has been experiencing some intermittent dysphagia. His barium swallow was unrevealing and he is brought to the operating room for EGD. Operative Findings:  Esophagus appears normal with a well-healed staple line from prior diverticulectomy. Very mild narrowing at the GE junction which is at 45 cm but no difficulty the passage of the scope through this area. Normal retroflexed view after Jaskaran fundoplication. Complications:   None    Procedure and Technique:  Patient was brought to the operating room placed in the supine position. After institution of adequate general anesthesia, the esophagoscope was inserted. The proximal mid and distal esophagus appear normal.  The GE junction is located just below 45 cm and above this the prior diverticulum repair site is visualized with a well-healed staple line. There is very mild narrowing at the GE junction though the endoscope goes through this without any significant pressure. The stomach distends normally and the pylorus appears to be functioning normally the stomach is somewhat U-shaped. Retroflexed view demonstrates a normal appearance of a intact Jaskaran fundoplication. There are no significant mucosal lesions.   The excess air was suctioned from the GI tract and the scope was removed. I was present for the entire procedure. Retroflexed view      Distal esophagus with prior diverticular staple line in bottom right.     Patient Disposition:  PACU         SIGNATURE: Brooks De Dios MD  DATE: November 2, 2023  TIME: 8:36 AM

## 2024-03-18 NOTE — PROGRESS NOTES
Assessment/Plan:    Esophageal diverticulum  Mr Aniket Muñoz is doing well postoperatively  I have asked him to liberalize his diet  He will remain upright while he eats and then for 3 to 4 hours after eating  We will refrain from heavy lifting for at least 2 months but can walk as far as he wants or use a treadmill  We will see him back in 4 weeks  Diagnoses and all orders for this visit:    Esophageal diverticulum          Thoracic History    Diagnosis: Epiphrenic diverticulum  Procedure: EGD, robotic resection of epiphrenic diverticulum, Heller myotomy, Jaskaran fundoplication on February 3, 2023         Subjective:    Patient ID: Simi Black is a 50 y o  male  Mr Aniket Muñoz returns to the office today for routine postsurgical care  He is now approximately 2 weeks postoperative from robotic resection of an epiphrenic diverticulum, Heller myotomy, and Jaskaran fundoplication  Overall, he is doing well postoperatively  He is largely pain-free  He has been able to eat chicken, salmon, and noodles  He did have one episode where he felt that oatmeal got stuck in his upper to mid esophagus  He has had no trouble with liquids  He denies fever, chills, nausea, or vomiting  He has felt like he has had a little more gas than normal   He underwent a chest x-ray on February 17, 2023 this is personally reviewed  There is no evidence of pneumothorax, consolidation, or effusions  The following portions of the patient's history were reviewed and updated as appropriate: allergies, current medications, past family history, past medical history, past social history, past surgical history and problem list     Review of Systems   Constitutional: Negative for chills, fatigue and fever  Respiratory: Negative for cough and shortness of breath  Cardiovascular: Negative for chest pain and leg swelling  Gastrointestinal: Negative for abdominal pain, nausea and vomiting           Objective:   Physical Clear Exam  Constitutional:       Appearance: Normal appearance  He is not ill-appearing  HENT:      Head: Normocephalic and atraumatic  Eyes:      General: No scleral icterus  Cardiovascular:      Rate and Rhythm: Normal rate and regular rhythm  Pulmonary:      Effort: Pulmonary effort is normal       Breath sounds: Normal breath sounds  No wheezing  Abdominal:      General: There is no distension  Palpations: Abdomen is soft  Musculoskeletal:      Right lower leg: No edema  Left lower leg: No edema  Neurological:      Mental Status: He is alert  /78 (BP Location: Right arm, Patient Position: Sitting, Cuff Size: Large)   Pulse 86   Temp 98 6 °F (37 °C) (Temporal)   Resp 17   Ht 5' 11" (1 803 m)   Wt 121 kg (266 lb 15 6 oz)   SpO2 97%   BMI 37 24 kg/m²       FL esophagram complete    Result Date: 2/4/2023  Narrative BARIUM SWALLOW-ESOPHAGRAM INDICATION:   s/p heller myotomy  COMPARISON:  None IMAGES:  11 FLUOROSCOPY TIME:   50 seconds  TECHNIQUE: The patient was given water-soluble contrast by mouth and images of the esophagus were obtained  FINDINGS: The esophagus is normal in caliber  There is normal emptying through the GE junction  There is no evidence of leak  Impression Unremarkable postoperative appearance   Workstation performed: [id number]

## 2024-05-25 ENCOUNTER — APPOINTMENT (EMERGENCY)
Dept: CT IMAGING | Facility: HOSPITAL | Age: 49
End: 2024-05-25
Payer: MEDICARE

## 2024-05-25 ENCOUNTER — APPOINTMENT (EMERGENCY)
Dept: RADIOLOGY | Facility: HOSPITAL | Age: 49
End: 2024-05-25
Payer: MEDICARE

## 2024-05-25 ENCOUNTER — HOSPITAL ENCOUNTER (EMERGENCY)
Facility: HOSPITAL | Age: 49
Discharge: HOME | End: 2024-05-26
Attending: EMERGENCY MEDICINE | Admitting: EMERGENCY MEDICINE
Payer: MEDICARE

## 2024-05-25 VITALS
HEART RATE: 66 BPM | SYSTOLIC BLOOD PRESSURE: 142 MMHG | OXYGEN SATURATION: 98 % | DIASTOLIC BLOOD PRESSURE: 93 MMHG | TEMPERATURE: 98.3 F | RESPIRATION RATE: 18 BRPM

## 2024-05-25 DIAGNOSIS — R10.9 ABDOMINAL PAIN: ICD-10-CM

## 2024-05-25 DIAGNOSIS — K44.9 HIATAL HERNIA: ICD-10-CM

## 2024-05-25 DIAGNOSIS — R07.9 CHEST PAIN: Primary | ICD-10-CM

## 2024-05-25 LAB
ALBUMIN SERPL BCP-MCNC: 4.5 G/DL (ref 3.5–5)
ALP SERPL-CCNC: 62 U/L (ref 34–104)
ALT SERPL W P-5'-P-CCNC: 21 U/L (ref 7–52)
ANION GAP SERPL CALCULATED.3IONS-SCNC: 8 MMOL/L (ref 4–13)
AST SERPL W P-5'-P-CCNC: 18 U/L (ref 13–39)
ATRIAL RATE: 208 BPM
BASOPHILS # BLD AUTO: 0.03 THOUSANDS/ÂΜL (ref 0–0.1)
BASOPHILS NFR BLD AUTO: 0 % (ref 0–1)
BILIRUB SERPL-MCNC: 0.33 MG/DL (ref 0.2–1)
BILIRUB UR QL STRIP: NEGATIVE
BUN SERPL-MCNC: 14 MG/DL (ref 5–25)
CALCIUM SERPL-MCNC: 9.3 MG/DL (ref 8.4–10.2)
CARDIAC TROPONIN I PNL SERPL HS: 3 NG/L
CHLORIDE SERPL-SCNC: 105 MMOL/L (ref 96–108)
CK SERPL-CCNC: 188 U/L (ref 39–308)
CLARITY UR: CLEAR
CO2 SERPL-SCNC: 26 MMOL/L (ref 21–32)
COLOR UR: NORMAL
CREAT SERPL-MCNC: 1.18 MG/DL (ref 0.6–1.3)
EOSINOPHIL # BLD AUTO: 0.06 THOUSAND/ÂΜL (ref 0–0.61)
EOSINOPHIL NFR BLD AUTO: 1 % (ref 0–6)
ERYTHROCYTE [DISTWIDTH] IN BLOOD BY AUTOMATED COUNT: 15.5 % (ref 11.6–15.1)
GFR SERPL CREATININE-BSD FRML MDRD: 71 ML/MIN/1.73SQ M
GLUCOSE SERPL-MCNC: 108 MG/DL (ref 65–140)
GLUCOSE UR STRIP-MCNC: NEGATIVE MG/DL
HCT VFR BLD AUTO: 41.1 % (ref 36.5–49.3)
HGB BLD-MCNC: 13.1 G/DL (ref 12–17)
HGB UR QL STRIP.AUTO: NEGATIVE
IMM GRANULOCYTES # BLD AUTO: 0.01 THOUSAND/UL (ref 0–0.2)
IMM GRANULOCYTES NFR BLD AUTO: 0 % (ref 0–2)
KETONES UR STRIP-MCNC: NEGATIVE MG/DL
LEUKOCYTE ESTERASE UR QL STRIP: NEGATIVE
LIPASE SERPL-CCNC: 32 U/L (ref 11–82)
LYMPHOCYTES # BLD AUTO: 2.7 THOUSANDS/ÂΜL (ref 0.6–4.47)
LYMPHOCYTES NFR BLD AUTO: 36 % (ref 14–44)
MCH RBC QN AUTO: 25.8 PG (ref 26.8–34.3)
MCHC RBC AUTO-ENTMCNC: 31.9 G/DL (ref 31.4–37.4)
MCV RBC AUTO: 81 FL (ref 82–98)
MONOCYTES # BLD AUTO: 0.44 THOUSAND/ÂΜL (ref 0.17–1.22)
MONOCYTES NFR BLD AUTO: 6 % (ref 4–12)
NEUTROPHILS # BLD AUTO: 4.24 THOUSANDS/ÂΜL (ref 1.85–7.62)
NEUTS SEG NFR BLD AUTO: 57 % (ref 43–75)
NITRITE UR QL STRIP: NEGATIVE
NRBC BLD AUTO-RTO: 0 /100 WBCS
P AXIS: 41 DEGREES
PH UR STRIP.AUTO: 5.5 [PH]
PLATELET # BLD AUTO: 318 THOUSANDS/UL (ref 149–390)
PMV BLD AUTO: 9.9 FL (ref 8.9–12.7)
POTASSIUM SERPL-SCNC: 3.8 MMOL/L (ref 3.5–5.3)
PR INTERVAL: 144 MS
PROT SERPL-MCNC: 7.7 G/DL (ref 6.4–8.4)
PROT UR STRIP-MCNC: NEGATIVE MG/DL
QRS AXIS: 69 DEGREES
QRSD INTERVAL: 74 MS
QT INTERVAL: 412 MS
QTC INTERVAL: 431 MS
RBC # BLD AUTO: 5.07 MILLION/UL (ref 3.88–5.62)
SODIUM SERPL-SCNC: 139 MMOL/L (ref 135–147)
SP GR UR STRIP.AUTO: 1.01 (ref 1–1.03)
T WAVE AXIS: 49 DEGREES
UROBILINOGEN UR STRIP-ACNC: <2 MG/DL
VENTRICULAR RATE: 66 BPM
WBC # BLD AUTO: 7.48 THOUSAND/UL (ref 4.31–10.16)

## 2024-05-25 PROCEDURE — 74174 CTA ABD&PLVS W/CONTRAST: CPT

## 2024-05-25 PROCEDURE — 36415 COLL VENOUS BLD VENIPUNCTURE: CPT

## 2024-05-25 PROCEDURE — 99285 EMERGENCY DEPT VISIT HI MDM: CPT | Performed by: EMERGENCY MEDICINE

## 2024-05-25 PROCEDURE — 85025 COMPLETE CBC W/AUTO DIFF WBC: CPT | Performed by: EMERGENCY MEDICINE

## 2024-05-25 PROCEDURE — 81003 URINALYSIS AUTO W/O SCOPE: CPT | Performed by: EMERGENCY MEDICINE

## 2024-05-25 PROCEDURE — 71275 CT ANGIOGRAPHY CHEST: CPT

## 2024-05-25 PROCEDURE — 96361 HYDRATE IV INFUSION ADD-ON: CPT

## 2024-05-25 PROCEDURE — 82550 ASSAY OF CK (CPK): CPT | Performed by: EMERGENCY MEDICINE

## 2024-05-25 PROCEDURE — 80053 COMPREHEN METABOLIC PANEL: CPT | Performed by: EMERGENCY MEDICINE

## 2024-05-25 PROCEDURE — 96374 THER/PROPH/DIAG INJ IV PUSH: CPT

## 2024-05-25 PROCEDURE — 99285 EMERGENCY DEPT VISIT HI MDM: CPT

## 2024-05-25 PROCEDURE — 93005 ELECTROCARDIOGRAM TRACING: CPT

## 2024-05-25 PROCEDURE — 96375 TX/PRO/DX INJ NEW DRUG ADDON: CPT

## 2024-05-25 PROCEDURE — 83690 ASSAY OF LIPASE: CPT | Performed by: EMERGENCY MEDICINE

## 2024-05-25 PROCEDURE — 71045 X-RAY EXAM CHEST 1 VIEW: CPT

## 2024-05-25 PROCEDURE — 84484 ASSAY OF TROPONIN QUANT: CPT | Performed by: EMERGENCY MEDICINE

## 2024-05-25 PROCEDURE — 73030 X-RAY EXAM OF SHOULDER: CPT

## 2024-05-25 RX ORDER — ONDANSETRON 2 MG/ML
4 INJECTION INTRAMUSCULAR; INTRAVENOUS ONCE
Status: COMPLETED | OUTPATIENT
Start: 2024-05-25 | End: 2024-05-25

## 2024-05-25 RX ORDER — FAMOTIDINE 10 MG/ML
20 INJECTION, SOLUTION INTRAVENOUS ONCE
Status: COMPLETED | OUTPATIENT
Start: 2024-05-25 | End: 2024-05-25

## 2024-05-25 RX ORDER — SODIUM CHLORIDE 9 MG/ML
150 INJECTION, SOLUTION INTRAVENOUS CONTINUOUS
Status: DISCONTINUED | OUTPATIENT
Start: 2024-05-25 | End: 2024-05-26 | Stop reason: HOSPADM

## 2024-05-25 RX ORDER — HYDROMORPHONE HCL/PF 1 MG/ML
0.5 SYRINGE (ML) INJECTION ONCE
Status: COMPLETED | OUTPATIENT
Start: 2024-05-25 | End: 2024-05-25

## 2024-05-25 RX ADMIN — FAMOTIDINE 20 MG: 10 INJECTION INTRAVENOUS at 22:38

## 2024-05-25 RX ADMIN — ONDANSETRON 4 MG: 2 INJECTION INTRAMUSCULAR; INTRAVENOUS at 22:22

## 2024-05-25 RX ADMIN — HYDROMORPHONE HYDROCHLORIDE 0.5 MG: 1 INJECTION, SOLUTION INTRAMUSCULAR; INTRAVENOUS; SUBCUTANEOUS at 22:20

## 2024-05-25 RX ADMIN — SODIUM CHLORIDE 1000 ML: 0.9 INJECTION, SOLUTION INTRAVENOUS at 22:20

## 2024-05-25 RX ADMIN — IOHEXOL 50 ML: 350 INJECTION, SOLUTION INTRAVENOUS at 22:33

## 2024-05-26 LAB
2HR DELTA HS TROPONIN: <-1 NG/L
CARDIAC TROPONIN I PNL SERPL HS: <2 NG/L

## 2024-05-26 PROCEDURE — 84484 ASSAY OF TROPONIN QUANT: CPT | Performed by: EMERGENCY MEDICINE

## 2024-05-26 PROCEDURE — 36415 COLL VENOUS BLD VENIPUNCTURE: CPT | Performed by: EMERGENCY MEDICINE

## 2024-05-26 PROCEDURE — C9113 INJ PANTOPRAZOLE SODIUM, VIA: HCPCS | Performed by: EMERGENCY MEDICINE

## 2024-05-26 PROCEDURE — 96375 TX/PRO/DX INJ NEW DRUG ADDON: CPT

## 2024-05-26 RX ORDER — OMEPRAZOLE 20 MG/1
20 CAPSULE, DELAYED RELEASE ORAL DAILY
Qty: 20 CAPSULE | Refills: 0 | Status: SHIPPED | OUTPATIENT
Start: 2024-05-27 | End: 2024-06-16

## 2024-05-26 RX ORDER — PANTOPRAZOLE SODIUM 40 MG/10ML
40 INJECTION, POWDER, LYOPHILIZED, FOR SOLUTION INTRAVENOUS ONCE
Status: COMPLETED | OUTPATIENT
Start: 2024-05-26 | End: 2024-05-26

## 2024-05-26 RX ADMIN — PANTOPRAZOLE SODIUM 40 MG: 40 INJECTION, POWDER, FOR SOLUTION INTRAVENOUS at 01:19

## 2024-05-26 NOTE — ED PROVIDER NOTES
History  Chief Complaint   Patient presents with    Chest Pain     Pt states he began with CP a few days ago that has been worsening and is now radiating to his back and shoulder blades. Pt also reports he is having mild SOB and nausea.     Patient is a 48 year old male with a few days of constant worsening chest pain with radiation to the back and shoulder and abdomen. No fever. (+) nausea and diarrhea. No urinary sx. No GI bleeding. No cough or sob. Was in [place] 1 week ago. No raw meat, eggs, fish or recent abx use. No known ill contacts. Has had prior hernia surgery and esophageal surgery. Has prostate cancer and recently started on lupron. Was last seen at [facility] on 3/19/24 for bilateral impacted cerumen. [facility] website checked on this patient and last Rx filled was on 5/15/24 for vicodin for 30 day supply.       History provided by:  Patient and spouse   used: No    Chest Pain  Associated symptoms: abdominal pain, back pain and nausea    Associated symptoms: no cough, no fever, no shortness of breath and not vomiting        Prior to Admission Medications   Prescriptions Last Dose Informant Patient Reported? Taking?   MELATONIN PO   Yes No   Sig: Take 20 mg by mouth daily   acetaminophen (TYLENOL) 650 mg CR tablet   Yes No   Sig: TAKE 1 TABLET BY MOUTH 4 TIMES A DAY FOR 3 DAYS, THEN 4 TIMES A DAY AS NEEDED FOR 4 DAYS.   albuterol (PROVENTIL HFA,VENTOLIN HFA) 90 mcg/act inhaler   Yes No   Sig: INHALE 2 PUFFS BY MOUTH EVERY 6 HOURS AS NEEDED FOR WHEEZE   amLODIPine (NORVASC) 10 mg tablet  Self Yes No   Sig: Take 10 mg by mouth every morning   atorvastatin (LIPITOR) 20 mg tablet  Self Yes No   Sig: Take 20 mg by mouth daily at bedtime   divalproex sodium (DEPAKOTE ER) 500 mg 24 hr tablet  Self Yes No   Si tab po qam and 2 tabs po qhs   famotidine (PEPCID) 20 mg tablet   Yes No   Sig: Take 20 mg by mouth every morning   gabapentin (NEURONTIN) 300 mg capsule   Yes No   Sig: Take  "300 mg by mouth 2 (two) times a day   ibuprofen (MOTRIN) 800 mg tablet   Yes No   leuprolide (LUPRON DEPOT 3 MONTH KIT) 22.5 mg injection  Self Yes No   Sig: Inject 22.5 mg into a muscle every 6 (six) months Per pt next dose end of 2023   lisinopril (ZESTRIL) 40 mg tablet  Self Yes No   Sig: Take 40 mg by mouth every morning   methylphenidate (RITALIN) 5 mg tablet  Self Yes No   Sig: if needed   prazosin (MINIPRESS) 1 mg capsule  Self Yes No   Si tab po qhs x 1 week then 2 tabs qhs   prochlorperazine (COMPAZINE) 10 mg tablet   Yes No   Sig: TAKE 1 TABLET EVERY 6 HOURS X 30 DAYS AS NEEDED FOR NAUSEA AND VOMITING   senna (SENOKOT) 8.6 MG tablet   Yes No   valproic acid (DEPAKENE) 250 MG/5ML soln   Yes No   venlafaxine (EFFEXOR) 37.5 mg tablet   Yes No      Facility-Administered Medications: None       Past Medical History:   Diagnosis Date    Anxiety     Arthritis     \"hips and knees\"    Asthma     per pt \"only if has a cold\"    Bipolar disorder (HCC)     Blood in the stool     per pt \"last time had this 2 mths ago or so\"    Cancer (HCC)     prostate    Chronic pain disorder     per pt \"from the Lupron especially lower body\"    Colon polyp     Cracked tooth     lower back right side tooth    Depression     Diverticulosis     Exercises 3 to 4 times per week     treadmill 30 min--light lifting    GERD (gastroesophageal reflux disease)     History of psychiatric treatment     History of therapeutic radiation 2015    x 36 rounds    Hyperlipidemia     Hypertension     Teeth missing     Wears glasses        Past Surgical History:   Procedure Laterality Date    COLONOSCOPY      FRACTURE SURGERY Left     wrist    INGUINAL HERNIA REPAIR  2023    PENILE PROSTHESIS IMPLANT      IL ESOPHAGOGASTRODUODENOSCOPY TRANSORAL DIAGNOSTIC N/A 2023    Procedure: ESOPHAGOGASTRODUODENOSCOPY (EGD);  Surgeon: Jose Iraheta MD;  Location: [facility];  Service: Thoracic    IL ESOPHAGOGASTRODUODENOSCOPY TRANSORAL " DIAGNOSTIC N/A 11/2/2023    Procedure: ESOPHAGOGASTRODUODENOSCOPY (EGD);  Surgeon: Jose Iraheta MD;  Location: [facility];  Service: Thoracic    MA LAPS ESOPHAGOMYOTOMY W/FUNDOPLASTY IF PERFORMED N/A 02/03/2023    Procedure: robotic assisted laparoscopic esophageal myotomy w/ partial fundoplication;  Surgeon: Jose Iraheta MD;  Location: [facility];  Service: Thoracic    PROSTATECTOMY  2014    UPPER GASTROINTESTINAL ENDOSCOPY      US GUIDED FINE NEEDLE ASPIRATION (ALL INC)  12/16/2014       Family History   Problem Relation Age of Onset    Cancer Other     Hypertension Other     Hyperlipidemia Other      I have reviewed and agree with the history as documented.    E-Cigarette/Vaping    E-Cigarette Use Never User      E-Cigarette/Vaping Substances     Social History     Tobacco Use    Smoking status: Never    Smokeless tobacco: Never   Vaping Use    Vaping status: Never Used   Substance Use Topics    Alcohol use: Not Currently    Drug use: Never       Review of Systems   Constitutional:  Negative for fever.   Respiratory:  Negative for cough and shortness of breath.    Cardiovascular:  Positive for chest pain.   Gastrointestinal:  Positive for abdominal pain, diarrhea and nausea. Negative for vomiting.   Genitourinary:  Negative for difficulty urinating.   Musculoskeletal:  Positive for arthralgias and back pain.   All other systems reviewed and are negative.      Physical Exam  Physical Exam  Vitals and nursing note reviewed.   Constitutional:       General: He is in acute distress (moderate).   HENT:      Head: Normocephalic and atraumatic.      Mouth/Throat:      Mouth: Mucous membranes are moist.   Eyes:      General: No scleral icterus.  Cardiovascular:      Rate and Rhythm: Normal rate and regular rhythm.      Heart sounds: Normal heart sounds. No murmur heard.  Pulmonary:      Effort: Pulmonary effort is normal. No respiratory distress.      Breath sounds: Normal breath sounds. No  stridor. No wheezing, rhonchi or rales.   Chest:      Chest wall: No tenderness.   Abdominal:      General: Bowel sounds are normal. There is no distension.      Palpations: Abdomen is soft.      Tenderness: There is abdominal tenderness (diffuse upper). There is no right CVA tenderness, left CVA tenderness, guarding or rebound.   Musculoskeletal:         General: No tenderness or deformity.      Cervical back: Normal range of motion and neck supple.      Right lower leg: No edema.      Left lower leg: No edema.   Skin:     General: Skin is warm and dry.      Coloration: Skin is not jaundiced.      Findings: No erythema or rash.   Neurological:      General: No focal deficit present.      Mental Status: He is alert and oriented to person, place, and time.   Psychiatric:         Mood and Affect: Mood normal.         Vital Signs  ED Triage Vitals   Temperature Pulse Respirations Blood Pressure SpO2   05/25/24 2212 05/25/24 2114 05/25/24 2114 05/25/24 2114 05/25/24 2114   98.3 °F (36.8 °C) 66 18 142/93 98 %      Temp Source Heart Rate Source Patient Position - Orthostatic VS BP Location FiO2 (%)   05/25/24 2212 05/25/24 2114 05/25/24 2114 05/25/24 2114 --   Oral Monitor Lying Right arm       Pain Score       05/25/24 2220       9           Vitals:    05/25/24 2114   BP: 142/93   Pulse: 66   Patient Position - Orthostatic VS: Lying         Visual Acuity      ED Medications  Medications   sodium chloride 0.9 % infusion (has no administration in time range)   pantoprazole (PROTONIX) injection 40 mg (has no administration in time range)   sodium chloride 0.9 % bolus 1,000 mL (1,000 mL Intravenous New Bag 5/25/24 2220)   HYDROmorphone (DILAUDID) injection 0.5 mg (0.5 mg Intravenous Given 5/25/24 2220)   ondansetron (ZOFRAN) injection 4 mg (4 mg Intravenous Given 5/25/24 2222)   Famotidine (PF) (PEPCID) injection 20 mg (20 mg Intravenous Given 5/25/24 2238)   iohexol (OMNIPAQUE) 350 MG/ML injection (MULTI-DOSE) 50 mL (50 mL  Intravenous Given 5/25/24 2233)       Diagnostic Studies  Results Reviewed       Procedure Component Value Units Date/Time    HS Troponin I 2hr [[id number]]  (Normal) Collected: 05/26/24 0028    Lab Status: Final result Specimen: Blood from Arm, Left Updated: 05/26/24 0101     hs TnI 2hr <2 ng/L      Delta 2hr hsTnI <-1 ng/L     UA w Reflex to Microscopic w Reflex to Culture [[id number]] Collected: 05/25/24 2224    Lab Status: Final result Specimen: Urine, Clean Catch Updated: 05/25/24 2234     Color, UA Light Yellow     Clarity, UA Clear     Specific Gravity, UA 1.015     pH, UA 5.5     Leukocytes, UA Negative     Nitrite, UA Negative     Protein, UA Negative mg/dl      Glucose, UA Negative mg/dl      Ketones, UA Negative mg/dl      Urobilinogen, UA <2.0 mg/dl      Bilirubin, UA Negative     Occult Blood, UA Negative    Lipase [[id number]]  (Normal) Collected: 05/25/24 2119    Lab Status: Final result Specimen: Blood from Arm, Left Updated: 05/25/24 2219     Lipase 32 u/L     CK [[id number]]  (Normal) Collected: 05/25/24 2119    Lab Status: Final result Specimen: Blood from Arm, Left Updated: 05/25/24 2219     Total  U/L     Stool Enteric Bacterial Panel by PCR [[id number]]     Lab Status: No result Specimen: Stool from Rectum     Clostridium difficile toxin by PCR with EIA [[id number]]     Lab Status: No result Specimen: Stool from Per Rectum     HS Troponin 0hr (reflex protocol) [[id number]]  (Normal) Collected: 05/25/24 2119    Lab Status: Final result Specimen: Blood from Arm, Left Updated: 05/25/24 2147     hs TnI 0hr 3 ng/L     Comprehensive metabolic panel [[id number]] Collected: 05/25/24 2119    Lab Status: Final result Specimen: Blood from Arm, Left Updated: 05/25/24 2144     Sodium 139 mmol/L      Potassium 3.8 mmol/L      Chloride 105 mmol/L      CO2 26 mmol/L      ANION GAP 8 mmol/L      BUN 14 mg/dL      Creatinine 1.18 mg/dL      Glucose 108 mg/dL      Calcium 9.3 mg/dL      AST 18 U/L      ALT 21 U/L       Alkaline Phosphatase 62 U/L      Total Protein 7.7 g/dL      Albumin 4.5 g/dL      Total Bilirubin 0.33 mg/dL      eGFR 71 ml/min/1.73sq m     Narrative:      National Kidney Disease Foundation guidelines for Chronic Kidney Disease (CKD):     Stage 1 with normal or high GFR (GFR > 90 mL/min/1.73 square meters)    Stage 2 Mild CKD (GFR = 60-89 mL/min/1.73 square meters)    Stage 3A Moderate CKD (GFR = 45-59 mL/min/1.73 square meters)    Stage 3B Moderate CKD (GFR = 30-44 mL/min/1.73 square meters)    Stage 4 Severe CKD (GFR = 15-29 mL/min/1.73 square meters)    Stage 5 End Stage CKD (GFR <15 mL/min/1.73 square meters)  Note: GFR calculation is accurate only with a steady state creatinine    CBC and differential [[id number]]  (Abnormal) Collected: 05/25/24 2119    Lab Status: Final result Specimen: Blood from Arm, Left Updated: 05/25/24 2126     WBC 7.48 Thousand/uL      RBC 5.07 Million/uL      Hemoglobin 13.1 g/dL      Hematocrit 41.1 %      MCV 81 fL      MCH 25.8 pg      MCHC 31.9 g/dL      RDW 15.5 %      MPV 9.9 fL      Platelets 318 Thousands/uL      nRBC 0 /100 WBCs      Segmented % 57 %      Immature Grans % 0 %      Lymphocytes % 36 %      Monocytes % 6 %      Eosinophils Relative 1 %      Basophils Relative 0 %      Absolute Neutrophils 4.24 Thousands/µL      Absolute Immature Grans 0.01 Thousand/uL      Absolute Lymphocytes 2.70 Thousands/µL      Absolute Monocytes 0.44 Thousand/µL      Eosinophils Absolute 0.06 Thousand/µL      Basophils Absolute 0.03 Thousands/µL                    CTA dissection protocol chest abdomen pelvis w wo contrast   ED Interpretation by Stan Deleon MD (05/26 0053)   Narrative & Impression  CTA - CHEST, ABDOMEN AND PELVIS - WITHOUT AND WITH IV CONTRAST     INDICATION: chest pain, back pain, upper abdominal pain.     COMPARISON: CT of the abdomen and pelvis on July 27, 2023.     TECHNIQUE: CT examination of the chest, abdomen and pelvis was performed both prior to and  after the administration of intravenous contrast. The noncontrast portion of this examination was performed utilizing low radiation dose technique. Thin section   angiographic arterial phase post contrast technique was used in order to evaluate for aortic dissection. 3D reformatted images and volume rendering were performed on an independent workstation. Additionally, axial, sagittal, and coronal 2D reformatted   images were created from the source data and submitted for interpretation.     Radiation dose length product (DLP) for this visit: 690 mGy-cm . This examination, like all CT scans performed in the [facility], was performed utilizing techniques to minimize rad   iation dose exposure, including the use of iterative   reconstruction and automated exposure control.     IV Contrast: 50 mL of iohexol (OMNIPAQUE)  Enteric Contrast: Not administered.     FINDINGS:     AORTA: No aortic dissection or intramural hematoma. No aortic aneurysm.     Mild atherosclerotic disease. No flow limiting atherosclerotic stenosis of aorta or major aortic branch vessel in the chest, abdomen or pelvis. Bilateral dual renal arteries.        CHEST     LUNGS: No focal consolidation.. No tracheal or endobronchial lesion.     PLEURA: Unremarkable.     HEART/PULMONARY ARTERIAL TREE: Coronary artery calcifications. No significant pericardial effusion.     MEDIASTINUM AND JOVANI: Unremarkable.     CHEST WALL AND LOWER NECK: Unremarkable.     ABDOMEN     LIVER/BILIARY TREE: Unremarkable.     GALLBLADDER: No calcified gallstones. No pericholecystic inflammatory change.     SPLEEN: Unremarkable.     PANCREAS: Unremarkable.     ADRENAL GLANDS: Unremarkable.     KIDNEYS/URETERS: No    hydronephrosis or urinary tract calculi. Subcentimeter hypoattenuating renal lesion(s), too small to characterize but statistically likely benign, which do not warrant follow-up (Radiology June 2019).     STOMACH AND BOWEL: Postsurgical changes at the  gastroesophageal junction. Small hiatal hernia. Mild distention of the stomach. No bowel obstruction.     APPENDIX: Normal.     ABDOMINOPELVIC CAVITY: Small amount of pelvic free fluid. No pneumoperitoneum. No lymphadenopathy.     PELVIS     REPRODUCTIVE ORGANS: Penile implant in place with reservoir in the left lower quadrant.     URINARY BLADDER: Unremarkable.     ABDOMINAL WALL/INGUINAL REGIONS: Stable small amount of fluid at the umbilicus     BONES: No acute fracture or suspicious osseous lesion. Hemangioma in the T5 vertebral body.     IMPRESSION:     1.  No evidence of aortic dissection or aneurysm.  2.  Coronary artery calcifications.  3.  Small hiatal hernia. Mild distention of the stomach. No bowel obstruction           Worksta   tion performed: [id number]             Final Result by Nghia Dee MD (05/26 0046)      1.  No evidence of aortic dissection or aneurysm.   2.  Coronary artery calcifications.   3.  Small hiatal hernia. Mild distention of the stomach. No bowel obstruction            Workstation performed: [id number]         XR chest 1 view portable   ED Interpretation by Stan Deleon MD (05/25 2214)   No acute disease read by me.       XR shoulder 2+ views LEFT   ED Interpretation by Stan Deleon MD (05/25 2214)   Calcification and no fx or dislocation read by me.                  Procedures  ECG 12 Lead Documentation Only    Date/Time: 5/25/2024 10:01 PM    Performed by: Stan Deleon MD  Authorized by: Stan Deleon MD    Indications / Diagnosis:  Chest pain  ECG reviewed by me, the ED Provider: yes    Patient location:  ED  Previous ECG:     Previous ECG:  Compared to current    Comparison ECG info:  8/24/22    Similarity:  Changes noted (not s. sylvain now)  Quality:     Tracing quality:  Limited by artifact  Rate:     ECG rate:  66    ECG rate assessment: normal    Rhythm:     Rhythm: sinus rhythm    Ectopy:     Ectopy: none    QRS:     QRS axis:  Normal    QRS  intervals:  Normal  Conduction:     Conduction: normal    ST segments:     ST segments:  Normal  T waves:     T waves: normal    Comments:      I do not agree with computer reading of undetermined rhythm.            ED Course  ED Course as of 05/26/24 0115   Sat May 25, 2024   2214 Labs and EKG d/w patient and wife with patient's permission   2328 X-rays d/w patient and family. Pain improving.              HEART Risk Score      Flowsheet Row Most Recent Value   Heart Score Risk Calculator    History 2 Filed at: 05/26/2024 0112   ECG 0 Filed at: 05/26/2024 0112   Age 1 Filed at: 05/26/2024 0112   Risk Factors 1 Filed at: 05/26/2024 0112   Troponin 0 Filed at: 05/26/2024 0112   HEART Score 4 Filed at: 05/26/2024 0112                          SBIRT 22yo+      Flowsheet Row Most Recent Value   Initial Alcohol Screen: US AUDIT-C     1. How often do you have a drink containing alcohol? 0 Filed at: 05/25/2024 2123   2. How many drinks containing alcohol do you have on a typical day you are drinking?  0 Filed at: 05/25/2024 2123   3a. Male UNDER 65: How often do you have five or more drinks on one occasion? 0 Filed at: 05/25/2024 2123   Audit-C Score 0 Filed at: 05/25/2024 2123   AQUILES: How many times in the past year have you...    Used an illegal drug or used a prescription medication for non-medical reasons? Never Filed at: 05/25/2024 2123                      Medical Decision Making  DDX including but not limited to: ACS, MI, PE, PTX, pneumonia, dissection, pleurisy, pericarditis, myocarditis, rhabdomyolysis. DDx including but not limited to: appendicitis, gastroenteritis, gastritis, PUD, GERD, gastroparesis, hepatitis, pancreatitis, colitis, enteritis, diverticulitis, food poisoning, epiploic appendagitis, mesenteric adenitis, mesenteric panniculitis, mesenteric ischemia, IBD, IBS, ileus, bowel obstruction, volvulus, internal hernia, cholecystitis, biliary colic, choledocholithiasis, perforated viscus, tumor, splenic  etiology, constipation, AAA, doubt testicular torsion; renal colic, pyelonephritis, UTI.       Amount and/or Complexity of Data Reviewed  Labs: ordered. Decision-making details documented in ED Course.  Radiology: ordered and independent interpretation performed. Decision-making details documented in ED Course.  ECG/medicine tests: ordered and independent interpretation performed. Decision-making details documented in ED Course.    Risk  Prescription drug management.  Parenteral controlled substances.             Disposition  Final diagnoses:   Chest pain   Abdominal pain   Hiatal hernia - small     Time reflects when diagnosis was documented in both MDM as applicable and the Disposition within this note       Time User Action Codes Description Comment    5/26/2024 12:54 AM Stan Deleon Add [R07.9] Chest pain     5/26/2024 12:54 AM Stan Deleon Add [R10.9] Abdominal pain     5/26/2024 12:54 AM Stan Deleon Add [K44.9] Hiatal hernia     5/26/2024 12:54 AM Stan Deleon Modify [K44.9] Hiatal hernia small          ED Disposition       ED Disposition   Discharge    Condition   Stable    Date/Time   Sun May 26, 2024 0113    Comment   Brodie Dutton discharge to home/self care.                   Follow-up Information       Follow up With Specialties Details Why Contact Info Additional Information    Michel Godinez MD Internal Medicine Call in 2 days Return sooner if increased pain, fever, vomiting, worsening diarrhea, difficulty breathing or urinating, rash. [address]  [address]  [facility] [address]  [phone]       [facility] Cardiology Call in 2 days  [address] [address] [phone] [facility], [address] [address], [place], [place], [address] [phone]            Patient's Medications   Discharge Prescriptions    OMEPRAZOLE (PRILOSEC) 20 MG DELAYED RELEASE CAPSULE     Take 1 capsule (20 mg total) by mouth daily for 20 days Do not start before May 27, 2024.       Start Date: 5/27/2024 End Date: 6/16/2024       Order Dose: 20 mg       Quantity: 20 capsule    Refills: 0           PDMP Review         Value Time User    PDMP Reviewed  Yes 5/25/2024 10:00 PM Stan Deleon MD            ED Provider  Electronically Signed by             Stan Deleon MD  05/26/24 0115

## 2024-05-27 LAB
ATRIAL RATE: 208 BPM
P AXIS: 41 DEGREES
PR INTERVAL: 144 MS
QRS AXIS: 69 DEGREES
QRSD INTERVAL: 74 MS
QT INTERVAL: 412 MS
QTC INTERVAL: 431 MS
T WAVE AXIS: 49 DEGREES
VENTRICULAR RATE: 66 BPM

## 2024-05-27 PROCEDURE — 93010 ELECTROCARDIOGRAM REPORT: CPT | Performed by: INTERNAL MEDICINE

## 2024-06-07 ENCOUNTER — APPOINTMENT (EMERGENCY)
Dept: RADIOLOGY | Facility: HOSPITAL | Age: 49
End: 2024-06-07
Payer: COMMERCIAL

## 2024-06-07 ENCOUNTER — HOSPITAL ENCOUNTER (EMERGENCY)
Facility: HOSPITAL | Age: 49
Discharge: HOME | End: 2024-06-07
Attending: EMERGENCY MEDICINE
Payer: COMMERCIAL

## 2024-06-07 ENCOUNTER — APPOINTMENT (EMERGENCY)
Dept: CT IMAGING | Facility: HOSPITAL | Age: 49
End: 2024-06-07
Payer: COMMERCIAL

## 2024-06-07 VITALS
HEART RATE: 64 BPM | HEIGHT: 71 IN | OXYGEN SATURATION: 98 % | SYSTOLIC BLOOD PRESSURE: 139 MMHG | BODY MASS INDEX: 35.49 KG/M2 | RESPIRATION RATE: 17 BRPM | DIASTOLIC BLOOD PRESSURE: 96 MMHG | WEIGHT: 253.53 LBS

## 2024-06-07 DIAGNOSIS — R07.9 CHEST PAIN: ICD-10-CM

## 2024-06-07 DIAGNOSIS — R51.9 HEADACHE: ICD-10-CM

## 2024-06-07 DIAGNOSIS — T78.3XXA ANGIOEDEMA, INITIAL ENCOUNTER: Primary | ICD-10-CM

## 2024-06-07 DIAGNOSIS — T46.4X5A ADVERSE EFFECT OF LISINOPRIL, INITIAL ENCOUNTER: ICD-10-CM

## 2024-06-07 LAB
ALBUMIN SERPL BCP-MCNC: 4 G/DL (ref 3.5–5)
ALP SERPL-CCNC: 57 U/L (ref 34–104)
ALT SERPL W P-5'-P-CCNC: 24 U/L (ref 7–52)
ANION GAP SERPL CALCULATED.3IONS-SCNC: 7 MMOL/L (ref 4–13)
AST SERPL W P-5'-P-CCNC: 30 U/L (ref 13–39)
ATRIAL RATE: 77 BPM
BASOPHILS # BLD AUTO: 0.02 THOUSANDS/ÂΜL (ref 0–0.1)
BASOPHILS NFR BLD AUTO: 0 % (ref 0–1)
BILIRUB SERPL-MCNC: 0.45 MG/DL (ref 0.2–1)
BUN SERPL-MCNC: 18 MG/DL (ref 5–25)
CALCIUM SERPL-MCNC: 9.1 MG/DL (ref 8.4–10.2)
CARDIAC TROPONIN I PNL SERPL HS: 6 NG/L
CHLORIDE SERPL-SCNC: 108 MMOL/L (ref 96–108)
CO2 SERPL-SCNC: 26 MMOL/L (ref 21–32)
CREAT SERPL-MCNC: 1.01 MG/DL (ref 0.6–1.3)
EOSINOPHIL # BLD AUTO: 0.1 THOUSAND/ÂΜL (ref 0–0.61)
EOSINOPHIL NFR BLD AUTO: 2 % (ref 0–6)
ERYTHROCYTE [DISTWIDTH] IN BLOOD BY AUTOMATED COUNT: 14.9 % (ref 11.6–15.1)
GFR SERPL CREATININE-BSD FRML MDRD: 86 ML/MIN/1.73SQ M
GLUCOSE SERPL-MCNC: 89 MG/DL (ref 65–140)
HCT VFR BLD AUTO: 39.3 % (ref 36.5–49.3)
HGB BLD-MCNC: 12.8 G/DL (ref 12–17)
IMM GRANULOCYTES # BLD AUTO: 0.01 THOUSAND/UL (ref 0–0.2)
IMM GRANULOCYTES NFR BLD AUTO: 0 % (ref 0–2)
LYMPHOCYTES # BLD AUTO: 2.04 THOUSANDS/ÂΜL (ref 0.6–4.47)
LYMPHOCYTES NFR BLD AUTO: 37 % (ref 14–44)
MCH RBC QN AUTO: 26.3 PG (ref 26.8–34.3)
MCHC RBC AUTO-ENTMCNC: 32.6 G/DL (ref 31.4–37.4)
MCV RBC AUTO: 81 FL (ref 82–98)
MONOCYTES # BLD AUTO: 0.38 THOUSAND/ÂΜL (ref 0.17–1.22)
MONOCYTES NFR BLD AUTO: 7 % (ref 4–12)
NEUTROPHILS # BLD AUTO: 2.99 THOUSANDS/ÂΜL (ref 1.85–7.62)
NEUTS SEG NFR BLD AUTO: 54 % (ref 43–75)
NRBC BLD AUTO-RTO: 0 /100 WBCS
P AXIS: 18 DEGREES
PLATELET # BLD AUTO: 284 THOUSANDS/UL (ref 149–390)
PMV BLD AUTO: 9.7 FL (ref 8.9–12.7)
POTASSIUM SERPL-SCNC: 3.8 MMOL/L (ref 3.5–5.3)
PR INTERVAL: 162 MS
PROT SERPL-MCNC: 7.2 G/DL (ref 6.4–8.4)
QRS AXIS: 67 DEGREES
QRSD INTERVAL: 72 MS
QT INTERVAL: 418 MS
QTC INTERVAL: 473 MS
RBC # BLD AUTO: 4.86 MILLION/UL (ref 3.88–5.62)
SODIUM SERPL-SCNC: 141 MMOL/L (ref 135–147)
T WAVE AXIS: 50 DEGREES
VENTRICULAR RATE: 77 BPM
WBC # BLD AUTO: 5.54 THOUSAND/UL (ref 4.31–10.16)

## 2024-06-07 PROCEDURE — 96374 THER/PROPH/DIAG INJ IV PUSH: CPT

## 2024-06-07 PROCEDURE — 96375 TX/PRO/DX INJ NEW DRUG ADDON: CPT

## 2024-06-07 PROCEDURE — 99285 EMERGENCY DEPT VISIT HI MDM: CPT | Performed by: EMERGENCY MEDICINE

## 2024-06-07 PROCEDURE — 70450 CT HEAD/BRAIN W/O DYE: CPT

## 2024-06-07 PROCEDURE — 71046 X-RAY EXAM CHEST 2 VIEWS: CPT

## 2024-06-07 PROCEDURE — 99284 EMERGENCY DEPT VISIT MOD MDM: CPT

## 2024-06-07 PROCEDURE — 85025 COMPLETE CBC W/AUTO DIFF WBC: CPT

## 2024-06-07 PROCEDURE — 36415 COLL VENOUS BLD VENIPUNCTURE: CPT

## 2024-06-07 PROCEDURE — 93005 ELECTROCARDIOGRAM TRACING: CPT

## 2024-06-07 PROCEDURE — 84484 ASSAY OF TROPONIN QUANT: CPT

## 2024-06-07 PROCEDURE — 93010 ELECTROCARDIOGRAM REPORT: CPT | Performed by: INTERNAL MEDICINE

## 2024-06-07 PROCEDURE — 80053 COMPREHEN METABOLIC PANEL: CPT

## 2024-06-07 RX ORDER — METHOCARBAMOL 750 MG/1
750 TABLET, FILM COATED ORAL EVERY 6 HOURS PRN
COMMUNITY

## 2024-06-07 RX ORDER — MELOXICAM 15 MG/1
15 TABLET ORAL DAILY
COMMUNITY

## 2024-06-07 RX ORDER — ACETAMINOPHEN 325 MG/1
975 TABLET ORAL ONCE
Status: COMPLETED | OUTPATIENT
Start: 2024-06-07 | End: 2024-06-07

## 2024-06-07 RX ORDER — DIPHENHYDRAMINE HYDROCHLORIDE 50 MG/ML
25 INJECTION INTRAMUSCULAR; INTRAVENOUS ONCE
Status: COMPLETED | OUTPATIENT
Start: 2024-06-07 | End: 2024-06-07

## 2024-06-07 RX ORDER — PREDNISONE 20 MG/1
60 TABLET ORAL ONCE
Status: COMPLETED | OUTPATIENT
Start: 2024-06-07 | End: 2024-06-07

## 2024-06-07 RX ORDER — FAMOTIDINE 10 MG/ML
20 INJECTION, SOLUTION INTRAVENOUS ONCE
Status: COMPLETED | OUTPATIENT
Start: 2024-06-07 | End: 2024-06-07

## 2024-06-07 RX ADMIN — FAMOTIDINE 20 MG: 10 INJECTION INTRAVENOUS at 08:50

## 2024-06-07 RX ADMIN — PREDNISONE 60 MG: 20 TABLET ORAL at 08:49

## 2024-06-07 RX ADMIN — DIPHENHYDRAMINE HYDROCHLORIDE 25 MG: 50 INJECTION, SOLUTION INTRAMUSCULAR; INTRAVENOUS at 08:50

## 2024-06-07 RX ADMIN — ACETAMINOPHEN 975 MG: 325 TABLET, FILM COATED ORAL at 08:49

## 2024-06-07 NOTE — DISCHARGE INSTRUCTIONS
Stop taking lisinopril. Follow up with your primary doctor soon to get a new high blood pressure medication.    There is a chance of recurrence of the swelling after you stop lisinopril for the first few months.    Come back if you notice your throat, tongue start to swell or start having difficulty breathing.

## 2024-06-07 NOTE — ED PROVIDER NOTES
History  Chief Complaint   Patient presents with    Headache     Patient reports B/L temporal pain that started about an hour an a half ago.    Facial Swelling     Swelling on the right upper and bottom lip that start about an hour ago. Denies any current new medication or any new foods.       Brodie Dutton is a 48 y.o. male     They presented to the emergency department on June 7, 2024. Patient presents with:  Headache, right sided facial swelling, and nausea that started an hour and a half ago. The patient right cheek and right upper and bottom lip are swollen but does not cross the midline. The patient just started taking meloxicam and methocarbamol a week and a half ago. The patient denies any throat swelling, tongue swelling or difficulty breathing. The patient hasn't eaten anything he hasn't eaten before. The patient has been taking lisinopril for over a decade. PMH includes prostatectomy for prostate cancer, leuprolide injections, lower GI bleed that he is going to gastroenterology next week. Patient is also complaining of retrosternal chest pain that comes and goes for the last few weeks. The patient was recently worked up for cardiac issues in the emergency department. The patient denies fevers, chills, cough, SOB, abdominal pain, vomiting, diarrhea, constipation, dysuria, polyuria, hematuria, or any other complaints at this time.                Prior to Admission Medications   Prescriptions Last Dose Informant Patient Reported? Taking?   MELATONIN PO   Yes No   Sig: Take 20 mg by mouth daily   acetaminophen (TYLENOL) 650 mg CR tablet   Yes No   Sig: TAKE 1 TABLET BY MOUTH 4 TIMES A DAY FOR 3 DAYS, THEN 4 TIMES A DAY AS NEEDED FOR 4 DAYS.   albuterol (PROVENTIL HFA,VENTOLIN HFA) 90 mcg/act inhaler   Yes No   Sig: INHALE 2 PUFFS BY MOUTH EVERY 6 HOURS AS NEEDED FOR WHEEZE   amLODIPine (NORVASC) 10 mg tablet  Self Yes No   Sig: Take 10 mg by mouth every morning   atorvastatin (LIPITOR) 20 mg tablet   "Self Yes No   Sig: Take 20 mg by mouth daily at bedtime   divalproex sodium (DEPAKOTE ER) 500 mg 24 hr tablet  Self Yes No   Si tab po qam and 2 tabs po qhs   famotidine (PEPCID) 20 mg tablet   Yes No   Sig: Take 20 mg by mouth every morning   gabapentin (NEURONTIN) 300 mg capsule   Yes No   Sig: Take 300 mg by mouth 2 (two) times a day   ibuprofen (MOTRIN) 800 mg tablet   Yes No   leuprolide (LUPRON DEPOT 3 MONTH KIT) 22.5 mg injection  Self Yes No   Sig: Inject 22.5 mg into a muscle every 6 (six) months Per pt next dose end of 2023   lisinopril (ZESTRIL) 40 mg tablet  Self Yes No   Sig: Take 40 mg by mouth every morning   meloxicam (MOBIC) 15 mg tablet   Yes Yes   Sig: Take 15 mg by mouth daily   methocarbamol (ROBAXIN) 750 mg tablet   Yes Yes   Sig: Take 750 mg by mouth every 6 (six) hours as needed for muscle spasms   methylphenidate (RITALIN) 5 mg tablet  Self Yes No   Sig: if needed   omeprazole (PriLOSEC) 20 mg delayed release capsule   No No   Sig: Take 1 capsule (20 mg total) by mouth daily for 20 days Do not start before May 27, 2024.   prazosin (MINIPRESS) 1 mg capsule  Self Yes No   Si tab po qhs x 1 week then 2 tabs qhs   prochlorperazine (COMPAZINE) 10 mg tablet   Yes No   Sig: TAKE 1 TABLET EVERY 6 HOURS X 30 DAYS AS NEEDED FOR NAUSEA AND VOMITING   senna (SENOKOT) 8.6 MG tablet   Yes No   valproic acid (DEPAKENE) 250 MG/5ML soln   Yes No   venlafaxine (EFFEXOR) 37.5 mg tablet   Yes No      Facility-Administered Medications: None       Past Medical History:   Diagnosis Date    Anxiety     Arthritis     \"hips and knees\"    Asthma     per pt \"only if has a cold\"    Bipolar disorder (HCC)     Blood in the stool     per pt \"last time had this 2 mths ago or so\"    Cancer (HCC)     prostate    Chronic pain disorder     per pt \"from the Lupron especially lower body\"    Colon polyp     Cracked tooth     lower back right side tooth    Depression     Diverticulosis     Exercises 3 to 4 times per " week     treadmill 30 min--light lifting    GERD (gastroesophageal reflux disease)     History of psychiatric treatment     History of therapeutic radiation 2015    x 36 rounds    Hyperlipidemia     Hypertension     Teeth missing     Wears glasses        Past Surgical History:   Procedure Laterality Date    COLONOSCOPY      FRACTURE SURGERY Left     wrist    INGUINAL HERNIA REPAIR  08/01/2023    PENILE PROSTHESIS IMPLANT      DC ESOPHAGOGASTRODUODENOSCOPY TRANSORAL DIAGNOSTIC N/A 02/03/2023    Procedure: ESOPHAGOGASTRODUODENOSCOPY (EGD);  Surgeon: Jose Iraheta MD;  Location: [facility];  Service: Thoracic    DC ESOPHAGOGASTRODUODENOSCOPY TRANSORAL DIAGNOSTIC N/A 11/2/2023    Procedure: ESOPHAGOGASTRODUODENOSCOPY (EGD);  Surgeon: Jose Iraheta MD;  Location: [facility];  Service: Thoracic    DC LAPS ESOPHAGOMYOTOMY W/FUNDOPLASTY IF PERFORMED N/A 02/03/2023    Procedure: robotic assisted laparoscopic esophageal myotomy w/ partial fundoplication;  Surgeon: Jose Iraheta MD;  Location: [facility];  Service: Thoracic    PROSTATECTOMY  2014    UPPER GASTROINTESTINAL ENDOSCOPY      US GUIDED FINE NEEDLE ASPIRATION (ALL INC)  12/16/2014       Family History   Problem Relation Age of Onset    Cancer Other     Hypertension Other     Hyperlipidemia Other      I have reviewed and agree with the history as documented.    E-Cigarette/Vaping    E-Cigarette Use Never User      E-Cigarette/Vaping Substances     Social History     Tobacco Use    Smoking status: Never    Smokeless tobacco: Never   Vaping Use    Vaping status: Never Used   Substance Use Topics    Alcohol use: Not Currently    Drug use: Never        Review of Systems   Constitutional:  Negative for chills and fever.   HENT:  Negative for ear pain and sore throat.    Eyes:  Negative for pain and visual disturbance.   Respiratory:  Negative for cough and shortness of breath.    Cardiovascular:  Negative for chest pain and palpitations.    Gastrointestinal:  Negative for abdominal pain and vomiting.   Genitourinary:  Negative for dysuria and hematuria.   Musculoskeletal:  Negative for arthralgias and back pain.   Skin:  Negative for color change and rash.   Neurological:  Negative for seizures and syncope.   All other systems reviewed and are negative.      Physical Exam  ED Triage Vitals [06/07/24 0809]   Temp Pulse Respirations Blood Pressure SpO2   -- 64 17 139/96 98 %      Temp src Heart Rate Source Patient Position - Orthostatic VS BP Location FiO2 (%)   -- Monitor Sitting Right arm --      Pain Score       7             Orthostatic Vital Signs  Vitals:    06/07/24 0809   BP: 139/96   Pulse: 64   Patient Position - Orthostatic VS: Sitting       Physical Exam  Vitals and nursing note reviewed.   Constitutional:       General: He is not in acute distress.     Appearance: He is well-developed.   HENT:      Head: Normocephalic and atraumatic.        Mouth/Throat:      Pharynx: Uvula midline. No pharyngeal swelling, oropharyngeal exudate, posterior oropharyngeal erythema or uvula swelling.      Tonsils: No tonsillar exudate. 1+ on the right. 1+ on the left.   Eyes:      Conjunctiva/sclera: Conjunctivae normal.   Cardiovascular:      Rate and Rhythm: Normal rate and regular rhythm.      Pulses: Normal pulses.      Heart sounds: Normal heart sounds. No murmur heard.     No friction rub. No gallop.   Pulmonary:      Effort: Pulmonary effort is normal. No respiratory distress.      Breath sounds: Normal breath sounds. No stridor. No wheezing, rhonchi or rales.   Abdominal:      Palpations: Abdomen is soft.      Tenderness: There is no abdominal tenderness. There is no guarding.      Hernia: No hernia is present.   Musculoskeletal:         General: No swelling.      Cervical back: Neck supple.   Lymphadenopathy:      Cervical: Cervical adenopathy (bilaterally enlarged) present.   Skin:     General: Skin is warm and dry.      Capillary Refill: Capillary  refill takes less than 2 seconds.   Neurological:      Mental Status: He is alert.      Comments: A&Ox4. Face symmetric, Tongue midline. CN II-XII intact. 5/5 strength in the bilateral upper extremity with intact sensation to light touch throughout. Lower extremity unable to move due to chronic pain, with intact sensation throughout. Normal Finger-to-nose and Rapid alternating movements bilaterally. No pronator drift. Normal speech.    Psychiatric:         Mood and Affect: Mood normal.         ED Medications  Medications   Famotidine (PF) (PEPCID) injection 20 mg (20 mg Intravenous Given 6/7/24 0850)   diphenhydrAMINE (BENADRYL) injection 25 mg (25 mg Intravenous Given 6/7/24 0850)   predniSONE tablet 60 mg (60 mg Oral Given 6/7/24 0849)   acetaminophen (TYLENOL) tablet 975 mg (975 mg Oral Given 6/7/24 0849)       Diagnostic Studies  Results Reviewed       Procedure Component Value Units Date/Time    HS Troponin 0hr (reflex protocol) [[id number]]  (Normal) Collected: 06/07/24 0848    Lab Status: Final result Specimen: Blood from Arm, Left Updated: 06/07/24 0915     hs TnI 0hr 6 ng/L     Comprehensive metabolic panel [[id number]] Collected: 06/07/24 0848    Lab Status: Final result Specimen: Blood from Arm, Left Updated: 06/07/24 0908     Sodium 141 mmol/L      Potassium 3.8 mmol/L      Chloride 108 mmol/L      CO2 26 mmol/L      ANION GAP 7 mmol/L      BUN 18 mg/dL      Creatinine 1.01 mg/dL      Glucose 89 mg/dL      Calcium 9.1 mg/dL      AST 30 U/L      ALT 24 U/L      Alkaline Phosphatase 57 U/L      Total Protein 7.2 g/dL      Albumin 4.0 g/dL      Total Bilirubin 0.45 mg/dL      eGFR 86 ml/min/1.73sq m     Narrative:      [employer] guidelines for Chronic Kidney Disease (CKD):     Stage 1 with normal or high GFR (GFR > 90 mL/min/1.73 square meters)    Stage 2 Mild CKD (GFR = 60-89 mL/min/1.73 square meters)    Stage 3A Moderate CKD (GFR = 45-59 mL/min/1.73 square meters)    Stage 3B  Moderate CKD (GFR = 30-44 mL/min/1.73 square meters)    Stage 4 Severe CKD (GFR = 15-29 mL/min/1.73 square meters)    Stage 5 End Stage CKD (GFR <15 mL/min/1.73 square meters)  Note: GFR calculation is accurate only with a steady state creatinine    CBC and differential [[id number]]  (Abnormal) Collected: 06/07/24 0848    Lab Status: Final result Specimen: Blood from Arm, Left Updated: 06/07/24 0855     WBC 5.54 Thousand/uL      RBC 4.86 Million/uL      Hemoglobin 12.8 g/dL      Hematocrit 39.3 %      MCV 81 fL      MCH 26.3 pg      MCHC 32.6 g/dL      RDW 14.9 %      MPV 9.7 fL      Platelets 284 Thousands/uL      nRBC 0 /100 WBCs      Segmented % 54 %      Immature Grans % 0 %      Lymphocytes % 37 %      Monocytes % 7 %      Eosinophils Relative 2 %      Basophils Relative 0 %      Absolute Neutrophils 2.99 Thousands/µL      Absolute Immature Grans 0.01 Thousand/uL      Absolute Lymphocytes 2.04 Thousands/µL      Absolute Monocytes 0.38 Thousand/µL      Eosinophils Absolute 0.10 Thousand/µL      Basophils Absolute 0.02 Thousands/µL                    XR chest 2 views   ED Interpretation by Mike Delarosa MD (06/07 0946)   No acute cardiopulmonary abnormalities present. Unchanged from x-ray in may.      CT head without contrast   Final Result by Dmitri Mcfarland MD (06/07 0933)      No acute intracranial abnormality.                  Workstation performed: [id number]               Procedures  ECG 12 Lead Documentation Only    Date/Time: 6/7/2024 8:59 AM    Performed by: Mike Delarosa MD  Authorized by: Mike Delarosa MD    Indications / Diagnosis:  Chest pain  ECG reviewed by me, the ED Provider: yes    Patient location:  ED  Previous ECG:     Previous ECG:  Compared to current    Similarity:  No change  Interpretation:     Interpretation: normal    Rate:     ECG rate:  77    ECG rate assessment: normal    Rhythm:     Rhythm: sinus rhythm    Ectopy:     Ectopy: none    QRS:     QRS axis:  Normal    QRS intervals:   Normal  Conduction:     Conduction: normal    ST segments:     ST segments:  Normal  T waves:     T waves: normal          ED Course  ED Course as of 06/07/24 1631   Fri Jun 07, 2024   0934 Patient re-evaluated. Headache pain was 9/10 now 4/10. Nausea completely resolved. Right cheek mildly decreased swelling. Lip swelling the same.             HEART Risk Score      Flowsheet Row Most Recent Value   Heart Score Risk Calculator    History 1 Filed at: 06/07/2024 1630   ECG 0 Filed at: 06/07/2024 1630   Age 1 Filed at: 06/07/2024 1630   Risk Factors 1 Filed at: 06/07/2024 1630   Troponin 0 Filed at: 06/07/2024 1630   HEART Score 3 Filed at: 06/07/2024 1630                        SBIRT 20yo+      Flowsheet Row Most Recent Value   Initial Alcohol Screen: US AUDIT-C     1. How often do you have a drink containing alcohol? 0 Filed at: 06/07/2024 0809   2. How many drinks containing alcohol do you have on a typical day you are drinking?  0 Filed at: 06/07/2024 0809   3b. FEMALE Any Age, or MALE 65+: How often do you have 4 or more drinks on one occassion? 0 Filed at: 06/07/2024 0809   Audit-C Score 0 Filed at: 06/07/2024 0809   AQUILES: How many times in the past year have you...    Used an illegal drug or used a prescription medication for non-medical reasons? Never Filed at: 06/07/2024 0809                  Medical Decision Making  Patient presents with:  Headache, right sided facial swelling, and nausea that started an hour and a half ago. The patient right cheek and right upper and bottom lip are swollen but does not cross the midline. The patient just started taking meloxicam and methocarbamol a week and a half ago. The patient denies any throat swelling, tongue swelling or difficulty breathing. The patient hasn't eaten anything he hasn't eaten before. The patient has been taking lisinopril for over a decade. PMH includes prostatectomy for prostate cancer, leuprolide injections, lower GI bleed that he is going to  gastroenterology next week. Patient is also complaining of retrosternal chest pain that comes and goes for the last few weeks. The patient was recently worked up for cardiac issues in the emergency department.     Patient seen and examined noted to have angioedema of the right cheek and right upper and lower lip that does not cross the midline.      Differential diagnosis includes but is not limited to ACE inhibitor induced angioedema, anaphylaxis.      Patient's labs notable for: unremarkable CBC< CMP, troponin, Imaging revealed: no acute cardiopulmonary or intracranial abnormalities present, and EKG: interpreted by myself as above.    Heart Score: 3     Patient appears well, is nontoxic appearing, Brodie Dutton expresses understanding and agrees with plan of care at this time.  In light of this patient would benefit from outpatient management.    Patient was rx'd as below       Amount and/or Complexity of Data Reviewed  Labs: ordered.  Radiology: ordered and independent interpretation performed.    Risk  OTC drugs.  Prescription drug management.          Disposition  Final diagnoses:   Adverse effect of lisinopril, initial encounter   Headache   Chest pain   Angioedema, initial encounter     Time reflects when diagnosis was documented in both MDM as applicable and the Disposition within this note       Time User Action Codes Description Comment    6/7/2024 10:02 AM Mike Delarosa Add [T46.4X5A] Adverse effect of lisinopril, initial encounter     6/7/2024 10:02 AM Mike Delarosa Add [R51.9] Headache     6/7/2024 10:02 AM Mike Delarosa Add [R07.9] Chest pain     6/7/2024 10:02 AM Mike Delarosa Add [T78.3XXA] Angioedema, initial encounter     6/7/2024 10:02 Mike Cook Modify [T46.4X5A] Adverse effect of lisinopril, initial encounter     6/7/2024 10:02 AM Mike Delarosa Modify [T78.3XXA] Angioedema, initial encounter           ED Disposition       ED Disposition   Discharge    Condition   Stable    Date/Time   Fri Jun 7, 2024  1013    Comment   Brodie Dutton discharge to home/self care.                   Follow-up Information       Follow up With Specialties Details Why Contact Info    Michel Godinez MD Internal Medicine Schedule an appointment as soon as possible for a visit   [address] [place] [address]  [address]  [place] [id number] [phone]              Discharge Medication List as of 6/7/2024 10:13 AM        CONTINUE these medications which have NOT CHANGED    Details   meloxicam (MOBIC) 15 mg tablet Take 15 mg by mouth daily, Historical Med      methocarbamol (ROBAXIN) 750 mg tablet Take 750 mg by mouth every 6 (six) hours as needed for muscle spasms, Historical Med      acetaminophen (TYLENOL) 650 mg CR tablet TAKE 1 TABLET BY MOUTH 4 TIMES A DAY FOR 3 DAYS, THEN 4 TIMES A DAY AS NEEDED FOR 4 DAYS., Historical Med      albuterol (PROVENTIL HFA,VENTOLIN HFA) 90 mcg/act inhaler INHALE 2 PUFFS BY MOUTH EVERY 6 HOURS AS NEEDED FOR WHEEZE, Historical Med      amLODIPine (NORVASC) 10 mg tablet Take 10 mg by mouth every morning, Starting Mon 8/15/2011, Historical Med      atorvastatin (LIPITOR) 20 mg tablet Take 20 mg by mouth daily at bedtime, Starting Tue 8/24/2021, Historical Med      divalproex sodium (DEPAKOTE ER) 500 mg 24 hr tablet 2 tab po qam and 2 tabs po qhs, Historical Med      famotidine (PEPCID) 20 mg tablet Take 20 mg by mouth every morning, Starting Tue 8/1/2023, Until Wed 7/31/2024, Historical Med      gabapentin (NEURONTIN) 300 mg capsule Take 300 mg by mouth 2 (two) times a day, Starting Thu 5/18/2023, Historical Med      ibuprofen (MOTRIN) 800 mg tablet Historical Med      leuprolide (LUPRON DEPOT 3 MONTH KIT) 22.5 mg injection Inject 22.5 mg into a muscle every 6 (six) months Per pt next dose end of 2/2023, Historical Med      MELATONIN PO Take 20 mg by mouth daily, Historical Med      methylphenidate (RITALIN) 5 mg tablet if needed, Starting Fri 8/12/2022, Historical Med      omeprazole (PriLOSEC) 20 mg delayed  release capsule Take 1 capsule (20 mg total) by mouth daily for 20 days Do not start before May 27, 2024., Starting Mon 5/27/2024, Until Sun 6/16/2024, Normal      prazosin (MINIPRESS) 1 mg capsule 1 tab po qhs x 1 week then 2 tabs qhs, Historical Med      prochlorperazine (COMPAZINE) 10 mg tablet TAKE 1 TABLET EVERY 6 HOURS X 30 DAYS AS NEEDED FOR NAUSEA AND VOMITING, Historical Med      senna (SENOKOT) 8.6 MG tablet Historical Med      valproic acid (DEPAKENE) 250 MG/5ML soln Starting Wed 8/9/2023, Historical Med      venlafaxine (EFFEXOR) 37.5 mg tablet Starting Wed 8/9/2023, Historical Med           STOP taking these medications       lisinopril (ZESTRIL) 40 mg tablet Comments:   Reason for Stopping:             No discharge procedures on file.    PDMP Review         Value Time User    PDMP Reviewed  Yes 5/25/2024 10:00 PM Stan Deleon MD             ED Provider  Attending physically available and evaluated Brodie Dutton. I managed the patient along with the ED Attending.    Electronically Signed by           Mike Delarosa MD  06/07/24 7252

## 2024-06-07 NOTE — ED ATTENDING ATTESTATION
6/7/2024  I, Brent Mathis MD, saw and evaluated the patient. I have discussed the patient with the resident/non-physician practitioner and agree with the resident's/non-physician practitioner's findings, Plan of Care, and MDM as documented in the resident's/non-physician practitioner's note, except where noted. All available labs and Radiology studies were reviewed.  I was present for key portions of any procedure(s) performed by the resident/non-physician practitioner and I was immediately available to provide assistance.       At this point I agree with the current assessment done in the Emergency Department.  I have conducted an independent evaluation of this patient a history and physical is as follows:    S:  Chief Complaint   Patient presents with    Headache     Patient reports B/L temporal pain that started about an hour an a half ago.    Facial Swelling     Swelling on the right upper and bottom lip that start about an hour ago. Denies any current new medication or any new foods.      Brodie is a 48 y.o. male who presents with the chief complaint of right sided swelling of his lips.  He reports he woke up with a headache this morning but when he went and looked in the mirror he noted this swelling in his lips.  He denies shortness of breath, dysphagia, rash.  No similar symptoms in the past.  He did have recent prescriptions for methocarbamol and meloxicam as well as an increase in his leupride.  He has a pmh significant for prostate cancer.  Of note, he is on lisinopril.    O:  ED Triage Vitals [06/07/24 0809]   Temp Pulse Respirations Blood Pressure SpO2   -- 64 17 139/96 98 %      Temp src Heart Rate Source Patient Position - Orthostatic VS BP Location FiO2 (%)   -- Monitor Sitting Right arm --      Pain Score       7         Physical Exam  Vitals and nursing note reviewed.   Constitutional:       General: He is in acute distress (mild).      Appearance: He is well-developed.   HENT:      Head:  Normocephalic and atraumatic.      Mouth/Throat:      Comments: He has right sided angio-edema - mild of the lips only, no tongue involvement, no posterior pharynx involvement  Eyes:      Pupils: Pupils are equal, round, and reactive to light.   Neck:      Vascular: No JVD.   Cardiovascular:      Rate and Rhythm: Normal rate and regular rhythm.      Heart sounds: Normal heart sounds. No murmur heard.     No friction rub. No gallop.   Pulmonary:      Effort: Pulmonary effort is normal. No respiratory distress.      Breath sounds: Normal breath sounds. No wheezing or rales.   Chest:      Chest wall: No tenderness.   Musculoskeletal:         General: No tenderness. Normal range of motion.      Cervical back: Normal range of motion.   Lymphadenopathy:      Cervical: Cervical adenopathy present.   Skin:     General: Skin is warm and dry.   Neurological:      General: No focal deficit present.      Mental Status: He is alert and oriented to person, place, and time.   Psychiatric:         Behavior: Behavior normal.         Thought Content: Thought content normal.         Judgment: Judgment normal.       A/P:  Background: 48 y.o. male presents with mild right sided angio edema of the lips; somewhat severe headache    Differential DX includes but is not limited to: suspect ace-induced angioedema given the laterality and severity of symptoms, less likely allergic reaction to meloxicam/methocarbamol (no rash or other allergic symptoms); headache likely tension headache but is described as severe could be SAH    Plan: cbc, cmp, treat with steroid/diphenhydramine, ct head to rule out SAH as onset was in the last 6 hours        ED Course     Labs Reviewed   CBC AND DIFFERENTIAL - Abnormal       Result Value Ref Range Status    WBC 5.54  4.31 - 10.16 Thousand/uL Final    RBC 4.86  3.88 - 5.62 Million/uL Final    Hemoglobin 12.8  12.0 - 17.0 g/dL Final    Hematocrit 39.3  36.5 - 49.3 % Final    MCV 81 (*) 82 - 98 fL Final    MCH  "26.3 (*) 26.8 - 34.3 pg Final    MCHC 32.6  31.4 - 37.4 g/dL Final    RDW 14.9  11.6 - 15.1 % Final    MPV 9.7  8.9 - 12.7 fL Final    Platelets 284  149 - 390 Thousands/uL Final    nRBC 0  /100 WBCs Final    Segmented % 54  43 - 75 % Final    Immature Grans % 0  0 - 2 % Final    Lymphocytes % 37  14 - 44 % Final    Monocytes % 7  4 - 12 % Final    Eosinophils Relative 2  0 - 6 % Final    Basophils Relative 0  0 - 1 % Final    Absolute Neutrophils 2.99  1.85 - 7.62 Thousands/µL Final    Absolute Immature Grans 0.01  0.00 - 0.20 Thousand/uL Final    Absolute Lymphocytes 2.04  0.60 - 4.47 Thousands/µL Final    Absolute Monocytes 0.38  0.17 - 1.22 Thousand/µL Final    Eosinophils Absolute 0.10  0.00 - 0.61 Thousand/µL Final    Basophils Absolute 0.02  0.00 - 0.10 Thousands/µL Final   HS TROPONIN I 0HR - Normal    hs TnI 0hr 6  \"Refer to ACS Flowchart\"- see link ng/L Final    Comment:                                              Initial (time 0) result  If >=50 ng/L, Myocardial injury suggested ;  Type of myocardial injury and treatment strategy  to be determined.  If 5-49 ng/L, a delta result at 2 hours and or 4 hours will be needed to further evaluate.  If <4 ng/L, and chest pain has been >3 hours since onset, patient may qualify for discharge based on the HEART score in the ED.  If <5 ng/L and <3hours since onset of chest pain, a delta result at 2 hours will be needed to further evaluate.    HS Troponin 99th Percentile URL of a Health Population=12 ng/L with a 95% Confidence Interval of 8-18 ng/L.    Second Troponin (time 2 hours)  If calculated delta >= 20 ng/L,  Myocardial injury suggested ; Type of myocardial injury and treatment strategy to be determined.  If 5-49 ng/L and the calculated delta is 5-19 ng/L, consult medical service for evaluation.  Continue evaluation for ischemia on ecg and other possible etiology and repeat hs troponin at 4 hours.  If delta is <5 ng/L at 2 hours, consider discharge based on risk " stratification via the HEART score (if in ED), or RANDALL risk score in IP/Observation.    HS Troponin 99th Percentile URL of a Health Population=12 ng/L with a 95% Confidence Interval of 8-18 ng/L.   COMPREHENSIVE METABOLIC PANEL    Sodium 141  135 - 147 mmol/L Final    Potassium 3.8  3.5 - 5.3 mmol/L Final    Chloride 108  96 - 108 mmol/L Final    CO2 26  21 - 32 mmol/L Final    ANION GAP 7  4 - 13 mmol/L Final    BUN 18  5 - 25 mg/dL Final    Creatinine 1.01  0.60 - 1.30 mg/dL Final    Comment: Standardized to IDMS reference method    Glucose 89  65 - 140 mg/dL Final    Comment: If the patient is fasting, the ADA then defines impaired fasting glucose as > 100 mg/dL and diabetes as > or equal to 123 mg/dL.    Calcium 9.1  8.4 - 10.2 mg/dL Final    AST 30  13 - 39 U/L Final    ALT 24  7 - 52 U/L Final    Comment: Specimen collection should occur prior to Sulfasalazine administration due to the potential for falsely depressed results.     Alkaline Phosphatase 57  34 - 104 U/L Final    Total Protein 7.2  6.4 - 8.4 g/dL Final    Albumin 4.0  3.5 - 5.0 g/dL Final    Total Bilirubin 0.45  0.20 - 1.00 mg/dL Final    Comment: Use of this assay is not recommended for patients undergoing treatment with eltrombopag due to the potential for falsely elevated results.  N-acetyl-p-benzoquinone imine (metabolite of Acetaminophen) will generate erroneously low results in samples for patients that have taken an overdose of Acetaminophen.    eGFR 86  ml/min/1.73sq m Final    Narrative:     National Kidney Disease Foundation guidelines for Chronic Kidney Disease (CKD):     Stage 1 with normal or high GFR (GFR > 90 mL/min/1.73 square meters)    Stage 2 Mild CKD (GFR = 60-89 mL/min/1.73 square meters)    Stage 3A Moderate CKD (GFR = 45-59 mL/min/1.73 square meters)    Stage 3B Moderate CKD (GFR = 30-44 mL/min/1.73 square meters)    Stage 4 Severe CKD (GFR = 15-29 mL/min/1.73 square meters)    Stage 5 End Stage CKD (GFR <15 mL/min/1.73  square meters)  Note: GFR calculation is accurate only with a steady state creatinine     XR chest 2 views   ED Interpretation   No acute cardiopulmonary abnormalities present. Unchanged from x-ray in may.      CT head without contrast   Final Result      No acute intracranial abnormality.                  Workstation performed: [id number]           Medications   Famotidine (PF) (PEPCID) injection 20 mg (20 mg Intravenous Given 6/7/24 0850)   diphenhydrAMINE (BENADRYL) injection 25 mg (25 mg Intravenous Given 6/7/24 0850)   predniSONE tablet 60 mg (60 mg Oral Given 6/7/24 0849)   acetaminophen (TYLENOL) tablet 975 mg (975 mg Oral Given 6/7/24 0849)         Critical Care Time  Procedures  Time reflects when diagnosis was documented in both MDM as applicable and the Disposition within this note       Time User Action Codes Description Comment    6/7/2024 10:02 AM Mike Delarosa Add [T46.4X5A] Adverse effect of lisinopril, initial encounter     6/7/2024 10:02 AM Mike Delarosa Add [R51.9] Headache     6/7/2024 10:02 AM Mike Delarosa Add [R07.9] Chest pain     6/7/2024 10:02 AM Mike Delarosa Add [T78.3XXA] Angioedema, initial encounter     6/7/2024 10:02 AM Mike Delarosa Modify [T46.4X5A] Adverse effect of lisinopril, initial encounter     6/7/2024 10:02 AM Mike Delarosa Modify [T78.3XXA] Angioedema, initial encounter           ED Disposition       ED Disposition   Discharge    Condition   Stable    Date/Time   Fri Jun 7, 2024 10:13 AM    Comment   Brodie Dutton discharge to home/self care.                   Follow-up Information       Follow up With Specialties Details Why Contact Info    Michel Godinez MD Internal Medicine Schedule an appointment as soon as possible for a visit   [address] [place] [address]  [address]  [facility] [id number] [phone]

## 2024-07-03 ENCOUNTER — APPOINTMENT (OUTPATIENT)
Dept: LAB | Facility: CLINIC | Age: 49
End: 2024-07-03
Payer: COMMERCIAL

## 2024-07-03 DIAGNOSIS — K92.1 BLOOD IN STOOL: ICD-10-CM

## 2024-07-03 LAB
ALBUMIN SERPL BCG-MCNC: 4.2 G/DL (ref 3.5–5)
ALP SERPL-CCNC: 60 U/L (ref 34–104)
ALT SERPL W P-5'-P-CCNC: 35 U/L (ref 7–52)
ANION GAP SERPL CALCULATED.3IONS-SCNC: 4 MMOL/L (ref 4–13)
AST SERPL W P-5'-P-CCNC: 27 U/L (ref 13–39)
BASOPHILS # BLD AUTO: 0.02 THOUSANDS/ÂΜL (ref 0–0.1)
BASOPHILS NFR BLD AUTO: 1 % (ref 0–1)
BILIRUB SERPL-MCNC: 0.52 MG/DL (ref 0.2–1)
BUN SERPL-MCNC: 12 MG/DL (ref 5–25)
CALCIUM SERPL-MCNC: 9 MG/DL (ref 8.4–10.2)
CHLORIDE SERPL-SCNC: 107 MMOL/L (ref 96–108)
CO2 SERPL-SCNC: 29 MMOL/L (ref 21–32)
CREAT SERPL-MCNC: 1.09 MG/DL (ref 0.6–1.3)
EOSINOPHIL # BLD AUTO: 0.08 THOUSAND/ÂΜL (ref 0–0.61)
EOSINOPHIL NFR BLD AUTO: 2 % (ref 0–6)
ERYTHROCYTE [DISTWIDTH] IN BLOOD BY AUTOMATED COUNT: 15.6 % (ref 11.6–15.1)
GFR SERPL CREATININE-BSD FRML MDRD: 78 ML/MIN/1.73SQ M
GLUCOSE P FAST SERPL-MCNC: 91 MG/DL (ref 65–99)
HCT VFR BLD AUTO: 43.7 % (ref 36.5–49.3)
HGB BLD-MCNC: 13.9 G/DL (ref 12–17)
IMM GRANULOCYTES # BLD AUTO: 0.01 THOUSAND/UL (ref 0–0.2)
IMM GRANULOCYTES NFR BLD AUTO: 0 % (ref 0–2)
LYMPHOCYTES # BLD AUTO: 1.28 THOUSANDS/ÂΜL (ref 0.6–4.47)
LYMPHOCYTES NFR BLD AUTO: 30 % (ref 14–44)
MCH RBC QN AUTO: 26.1 PG (ref 26.8–34.3)
MCHC RBC AUTO-ENTMCNC: 31.8 G/DL (ref 31.4–37.4)
MCV RBC AUTO: 82 FL (ref 82–98)
MONOCYTES # BLD AUTO: 0.37 THOUSAND/ÂΜL (ref 0.17–1.22)
MONOCYTES NFR BLD AUTO: 9 % (ref 4–12)
NEUTROPHILS # BLD AUTO: 2.5 THOUSANDS/ÂΜL (ref 1.85–7.62)
NEUTS SEG NFR BLD AUTO: 58 % (ref 43–75)
NRBC BLD AUTO-RTO: 0 /100 WBCS
PLATELET # BLD AUTO: 276 THOUSANDS/UL (ref 149–390)
PMV BLD AUTO: 9.9 FL (ref 8.9–12.7)
POTASSIUM SERPL-SCNC: 4.3 MMOL/L (ref 3.5–5.3)
PROT SERPL-MCNC: 7.2 G/DL (ref 6.4–8.4)
RBC # BLD AUTO: 5.33 MILLION/UL (ref 3.88–5.62)
SODIUM SERPL-SCNC: 140 MMOL/L (ref 135–147)
WBC # BLD AUTO: 4.26 THOUSAND/UL (ref 4.31–10.16)

## 2024-07-03 PROCEDURE — 85025 COMPLETE CBC W/AUTO DIFF WBC: CPT

## 2024-07-03 PROCEDURE — 36415 COLL VENOUS BLD VENIPUNCTURE: CPT

## 2024-07-03 PROCEDURE — 80053 COMPREHEN METABOLIC PANEL: CPT

## 2024-09-08 ENCOUNTER — HOSPITAL ENCOUNTER (OUTPATIENT)
Facility: HOSPITAL | Age: 49
Setting detail: OBSERVATION
Discharge: HOME | End: 2024-09-08
Attending: EMERGENCY MEDICINE | Admitting: STUDENT IN AN ORGANIZED HEALTH CARE EDUCATION/TRAINING PROGRAM
Payer: COMMERCIAL

## 2024-09-08 ENCOUNTER — APPOINTMENT (EMERGENCY)
Dept: RADIOLOGY | Facility: HOSPITAL | Age: 49
End: 2024-09-08
Payer: COMMERCIAL

## 2024-09-08 VITALS
BODY MASS INDEX: 34.84 KG/M2 | SYSTOLIC BLOOD PRESSURE: 123 MMHG | HEART RATE: 49 BPM | OXYGEN SATURATION: 99 % | DIASTOLIC BLOOD PRESSURE: 71 MMHG | RESPIRATION RATE: 18 BRPM | TEMPERATURE: 97.8 F | WEIGHT: 249.78 LBS

## 2024-09-08 DIAGNOSIS — R07.9 CHEST PAIN: Primary | ICD-10-CM

## 2024-09-08 PROBLEM — F31.9 BIPOLAR DISORDER (HCC): Status: ACTIVE | Noted: 2024-09-08

## 2024-09-08 PROBLEM — C61 PROSTATE CANCER (HCC): Status: ACTIVE | Noted: 2024-09-08

## 2024-09-08 LAB
2HR DELTA HS TROPONIN: >6 NG/L
4HR DELTA HS TROPONIN: >11 NG/L
ALBUMIN SERPL BCG-MCNC: 4 G/DL (ref 3.5–5)
ALP SERPL-CCNC: 46 U/L (ref 34–104)
ALT SERPL W P-5'-P-CCNC: 31 U/L (ref 7–52)
ANION GAP SERPL CALCULATED.3IONS-SCNC: 6 MMOL/L (ref 4–13)
ANION GAP SERPL CALCULATED.3IONS-SCNC: 7 MMOL/L (ref 4–13)
AST SERPL W P-5'-P-CCNC: 25 U/L (ref 13–39)
ATRIAL RATE: 101 BPM
ATRIAL RATE: 277 BPM
ATRIAL RATE: 47 BPM
ATRIAL RATE: 50 BPM
ATRIAL RATE: 51 BPM
ATRIAL RATE: 54 BPM
ATRIAL RATE: 57 BPM
ATRIAL RATE: 66 BPM
BASOPHILS # BLD AUTO: 0.01 THOUSANDS/ÂΜL (ref 0–0.1)
BASOPHILS # BLD AUTO: 0.03 THOUSANDS/ÂΜL (ref 0–0.1)
BASOPHILS NFR BLD AUTO: 0 % (ref 0–1)
BASOPHILS NFR BLD AUTO: 1 % (ref 0–1)
BILIRUB SERPL-MCNC: 0.31 MG/DL (ref 0.2–1)
BUN SERPL-MCNC: 15 MG/DL (ref 5–25)
BUN SERPL-MCNC: 17 MG/DL (ref 5–25)
CALCIUM SERPL-MCNC: 8.7 MG/DL (ref 8.4–10.2)
CALCIUM SERPL-MCNC: 8.7 MG/DL (ref 8.4–10.2)
CARDIAC TROPONIN I PNL SERPL HS: 13 NG/L
CARDIAC TROPONIN I PNL SERPL HS: 8 NG/L
CARDIAC TROPONIN I PNL SERPL HS: <2 NG/L
CHLORIDE SERPL-SCNC: 107 MMOL/L (ref 96–108)
CHLORIDE SERPL-SCNC: 109 MMOL/L (ref 96–108)
CHOLEST SERPL-MCNC: 130 MG/DL
CO2 SERPL-SCNC: 25 MMOL/L (ref 21–32)
CO2 SERPL-SCNC: 27 MMOL/L (ref 21–32)
CREAT SERPL-MCNC: 0.88 MG/DL (ref 0.6–1.3)
CREAT SERPL-MCNC: 1 MG/DL (ref 0.6–1.3)
EOSINOPHIL # BLD AUTO: 0.12 THOUSAND/ÂΜL (ref 0–0.61)
EOSINOPHIL # BLD AUTO: 0.15 THOUSAND/ÂΜL (ref 0–0.61)
EOSINOPHIL NFR BLD AUTO: 2 % (ref 0–6)
EOSINOPHIL NFR BLD AUTO: 3 % (ref 0–6)
ERYTHROCYTE [DISTWIDTH] IN BLOOD BY AUTOMATED COUNT: 15.9 % (ref 11.6–15.1)
ERYTHROCYTE [DISTWIDTH] IN BLOOD BY AUTOMATED COUNT: 15.9 % (ref 11.6–15.1)
EST. AVERAGE GLUCOSE BLD GHB EST-MCNC: 117 MG/DL
GFR SERPL CREATININE-BSD FRML MDRD: 100 ML/MIN/1.73SQ M
GFR SERPL CREATININE-BSD FRML MDRD: 87 ML/MIN/1.73SQ M
GLUCOSE SERPL-MCNC: 120 MG/DL (ref 65–140)
GLUCOSE SERPL-MCNC: 95 MG/DL (ref 65–140)
HBA1C MFR BLD: 5.7 %
HCT VFR BLD AUTO: 37.2 % (ref 36.5–49.3)
HCT VFR BLD AUTO: 37.2 % (ref 36.5–49.3)
HDLC SERPL-MCNC: 61 MG/DL
HGB BLD-MCNC: 12.1 G/DL (ref 12–17)
HGB BLD-MCNC: 12.1 G/DL (ref 12–17)
IMM GRANULOCYTES # BLD AUTO: 0.01 THOUSAND/UL (ref 0–0.2)
IMM GRANULOCYTES # BLD AUTO: 0.01 THOUSAND/UL (ref 0–0.2)
IMM GRANULOCYTES NFR BLD AUTO: 0 % (ref 0–2)
IMM GRANULOCYTES NFR BLD AUTO: 0 % (ref 0–2)
LDLC SERPL CALC-MCNC: 62 MG/DL (ref 0–100)
LYMPHOCYTES # BLD AUTO: 1.9 THOUSANDS/ÂΜL (ref 0.6–4.47)
LYMPHOCYTES # BLD AUTO: 2.71 THOUSANDS/ÂΜL (ref 0.6–4.47)
LYMPHOCYTES NFR BLD AUTO: 36 % (ref 14–44)
LYMPHOCYTES NFR BLD AUTO: 45 % (ref 14–44)
MAGNESIUM SERPL-MCNC: 2.1 MG/DL (ref 1.9–2.7)
MCH RBC QN AUTO: 26.2 PG (ref 26.8–34.3)
MCH RBC QN AUTO: 26.2 PG (ref 26.8–34.3)
MCHC RBC AUTO-ENTMCNC: 32.5 G/DL (ref 31.4–37.4)
MCHC RBC AUTO-ENTMCNC: 32.5 G/DL (ref 31.4–37.4)
MCV RBC AUTO: 81 FL (ref 82–98)
MCV RBC AUTO: 81 FL (ref 82–98)
MONOCYTES # BLD AUTO: 0.36 THOUSAND/ÂΜL (ref 0.17–1.22)
MONOCYTES # BLD AUTO: 0.48 THOUSAND/ÂΜL (ref 0.17–1.22)
MONOCYTES NFR BLD AUTO: 7 % (ref 4–12)
MONOCYTES NFR BLD AUTO: 8 % (ref 4–12)
NEUTROPHILS # BLD AUTO: 2.56 THOUSANDS/ÂΜL (ref 1.85–7.62)
NEUTROPHILS # BLD AUTO: 2.93 THOUSANDS/ÂΜL (ref 1.85–7.62)
NEUTS SEG NFR BLD AUTO: 43 % (ref 43–75)
NEUTS SEG NFR BLD AUTO: 55 % (ref 43–75)
NRBC BLD AUTO-RTO: 0 /100 WBCS
NRBC BLD AUTO-RTO: 0 /100 WBCS
P AXIS: 32 DEGREES
P AXIS: 33 DEGREES
P AXIS: 41 DEGREES
P AXIS: 44 DEGREES
P AXIS: 61 DEGREES
P AXIS: 63 DEGREES
P AXIS: 65 DEGREES
P AXIS: 68 DEGREES
P AXIS: 86 DEGREES
PLATELET # BLD AUTO: 264 THOUSANDS/UL (ref 149–390)
PLATELET # BLD AUTO: 282 THOUSANDS/UL (ref 149–390)
PMV BLD AUTO: 10.3 FL (ref 8.9–12.7)
PMV BLD AUTO: 9.9 FL (ref 8.9–12.7)
POTASSIUM SERPL-SCNC: 3.7 MMOL/L (ref 3.5–5.3)
POTASSIUM SERPL-SCNC: 4.1 MMOL/L (ref 3.5–5.3)
PR INTERVAL: 178 MS
PR INTERVAL: 180 MS
PR INTERVAL: 188 MS
PR INTERVAL: 190 MS
PR INTERVAL: 190 MS
PR INTERVAL: 194 MS
PR INTERVAL: 196 MS
PR INTERVAL: 196 MS
PR INTERVAL: 216 MS
PROT SERPL-MCNC: 6.5 G/DL (ref 6.4–8.4)
QRS AXIS: 37 DEGREES
QRS AXIS: 50 DEGREES
QRS AXIS: 52 DEGREES
QRS AXIS: 52 DEGREES
QRS AXIS: 57 DEGREES
QRS AXIS: 58 DEGREES
QRS AXIS: 66 DEGREES
QRS AXIS: 67 DEGREES
QRS AXIS: 68 DEGREES
QRS AXIS: 69 DEGREES
QRSD INTERVAL: 70 MS
QRSD INTERVAL: 72 MS
QRSD INTERVAL: 74 MS
QRSD INTERVAL: 78 MS
QRSD INTERVAL: 80 MS
QRSD INTERVAL: 82 MS
QRSD INTERVAL: 86 MS
QRSD INTERVAL: 86 MS
QT INTERVAL: 384 MS
QT INTERVAL: 450 MS
QT INTERVAL: 478 MS
QT INTERVAL: 492 MS
QT INTERVAL: 494 MS
QT INTERVAL: 496 MS
QT INTERVAL: 498 MS
QT INTERVAL: 516 MS
QT INTERVAL: 516 MS
QT INTERVAL: 526 MS
QTC INTERVAL: 456 MS
QTC INTERVAL: 456 MS
QTC INTERVAL: 458 MS
QTC INTERVAL: 465 MS
QTC INTERVAL: 466 MS
QTC INTERVAL: 468 MS
QTC INTERVAL: 470 MS
QTC INTERVAL: 471 MS
QTC INTERVAL: 479 MS
QTC INTERVAL: 497 MS
RBC # BLD AUTO: 4.61 MILLION/UL (ref 3.88–5.62)
RBC # BLD AUTO: 4.61 MILLION/UL (ref 3.88–5.62)
SODIUM SERPL-SCNC: 140 MMOL/L (ref 135–147)
SODIUM SERPL-SCNC: 141 MMOL/L (ref 135–147)
T WAVE AXIS: 31 DEGREES
T WAVE AXIS: 31 DEGREES
T WAVE AXIS: 32 DEGREES
T WAVE AXIS: 32 DEGREES
T WAVE AXIS: 38 DEGREES
T WAVE AXIS: 43 DEGREES
T WAVE AXIS: 45 DEGREES
T WAVE AXIS: 48 DEGREES
T WAVE AXIS: 55 DEGREES
T WAVE AXIS: 9 DEGREES
TRIGL SERPL-MCNC: 36 MG/DL
VENTRICULAR RATE: 101 BPM
VENTRICULAR RATE: 47 BPM
VENTRICULAR RATE: 47 BPM
VENTRICULAR RATE: 50 BPM
VENTRICULAR RATE: 51 BPM
VENTRICULAR RATE: 54 BPM
VENTRICULAR RATE: 57 BPM
VENTRICULAR RATE: 66 BPM
WBC # BLD AUTO: 5.33 THOUSAND/UL (ref 4.31–10.16)
WBC # BLD AUTO: 5.94 THOUSAND/UL (ref 4.31–10.16)

## 2024-09-08 PROCEDURE — 99285 EMERGENCY DEPT VISIT HI MDM: CPT

## 2024-09-08 PROCEDURE — 93005 ELECTROCARDIOGRAM TRACING: CPT

## 2024-09-08 PROCEDURE — 85025 COMPLETE CBC W/AUTO DIFF WBC: CPT | Performed by: NURSE PRACTITIONER

## 2024-09-08 PROCEDURE — 93010 ELECTROCARDIOGRAM REPORT: CPT | Performed by: INTERNAL MEDICINE

## 2024-09-08 PROCEDURE — 80048 BASIC METABOLIC PNL TOTAL CA: CPT

## 2024-09-08 PROCEDURE — 36415 COLL VENOUS BLD VENIPUNCTURE: CPT

## 2024-09-08 PROCEDURE — 84484 ASSAY OF TROPONIN QUANT: CPT

## 2024-09-08 PROCEDURE — 83735 ASSAY OF MAGNESIUM: CPT | Performed by: NURSE PRACTITIONER

## 2024-09-08 PROCEDURE — 85025 COMPLETE CBC W/AUTO DIFF WBC: CPT

## 2024-09-08 PROCEDURE — 80053 COMPREHEN METABOLIC PANEL: CPT | Performed by: NURSE PRACTITIONER

## 2024-09-08 PROCEDURE — 99285 EMERGENCY DEPT VISIT HI MDM: CPT | Performed by: EMERGENCY MEDICINE

## 2024-09-08 PROCEDURE — 71045 X-RAY EXAM CHEST 1 VIEW: CPT

## 2024-09-08 PROCEDURE — 96374 THER/PROPH/DIAG INJ IV PUSH: CPT

## 2024-09-08 PROCEDURE — 80061 LIPID PANEL: CPT | Performed by: NURSE PRACTITIONER

## 2024-09-08 PROCEDURE — 83036 HEMOGLOBIN GLYCOSYLATED A1C: CPT | Performed by: NURSE PRACTITIONER

## 2024-09-08 PROCEDURE — 99236 HOSP IP/OBS SAME DATE HI 85: CPT | Performed by: HOSPITALIST

## 2024-09-08 RX ORDER — NITROGLYCERIN 0.4 MG/1
0.4 TABLET SUBLINGUAL
Status: DISCONTINUED | OUTPATIENT
Start: 2024-09-08 | End: 2024-09-08

## 2024-09-08 RX ORDER — ATORVASTATIN CALCIUM 20 MG/1
20 TABLET, FILM COATED ORAL
Status: DISCONTINUED | OUTPATIENT
Start: 2024-09-08 | End: 2024-09-08 | Stop reason: HOSPADM

## 2024-09-08 RX ORDER — ALBUTEROL SULFATE 90 UG/1
2 AEROSOL, METERED RESPIRATORY (INHALATION) EVERY 4 HOURS PRN
Status: DISCONTINUED | OUTPATIENT
Start: 2024-09-08 | End: 2024-09-08 | Stop reason: HOSPADM

## 2024-09-08 RX ORDER — KETOROLAC TROMETHAMINE 30 MG/ML
15 INJECTION, SOLUTION INTRAMUSCULAR; INTRAVENOUS ONCE
Status: COMPLETED | OUTPATIENT
Start: 2024-09-08 | End: 2024-09-08

## 2024-09-08 RX ORDER — FAMOTIDINE 10 MG/ML
20 INJECTION, SOLUTION INTRAVENOUS EVERY 12 HOURS SCHEDULED
Status: DISCONTINUED | OUTPATIENT
Start: 2024-09-08 | End: 2024-09-08 | Stop reason: HOSPADM

## 2024-09-08 RX ORDER — VENLAFAXINE 37.5 MG/1
37.5 TABLET ORAL DAILY
Status: DISCONTINUED | OUTPATIENT
Start: 2024-09-08 | End: 2024-09-08 | Stop reason: HOSPADM

## 2024-09-08 RX ORDER — MELOXICAM 7.5 MG/1
15 TABLET ORAL DAILY
Status: DISCONTINUED | OUTPATIENT
Start: 2024-09-08 | End: 2024-09-08 | Stop reason: HOSPADM

## 2024-09-08 RX ORDER — METHOCARBAMOL 750 MG/1
750 TABLET, FILM COATED ORAL EVERY 6 HOURS PRN
Status: DISCONTINUED | OUTPATIENT
Start: 2024-09-08 | End: 2024-09-08 | Stop reason: HOSPADM

## 2024-09-08 RX ORDER — KETOROLAC TROMETHAMINE 30 MG/ML
15 INJECTION, SOLUTION INTRAMUSCULAR; INTRAVENOUS EVERY 6 HOURS PRN
Status: DISCONTINUED | OUTPATIENT
Start: 2024-09-08 | End: 2024-09-08 | Stop reason: HOSPADM

## 2024-09-08 RX ORDER — LANOLIN ALCOHOL/MO/W.PET/CERES
6 CREAM (GRAM) TOPICAL
Status: DISCONTINUED | OUTPATIENT
Start: 2024-09-08 | End: 2024-09-08 | Stop reason: HOSPADM

## 2024-09-08 RX ORDER — PANTOPRAZOLE SODIUM 40 MG/10ML
40 INJECTION, POWDER, LYOPHILIZED, FOR SOLUTION INTRAVENOUS
Status: DISCONTINUED | OUTPATIENT
Start: 2024-09-08 | End: 2024-09-08 | Stop reason: HOSPADM

## 2024-09-08 RX ORDER — ACETAMINOPHEN 325 MG/1
975 TABLET ORAL EVERY 8 HOURS
Status: DISCONTINUED | OUTPATIENT
Start: 2024-09-08 | End: 2024-09-08 | Stop reason: HOSPADM

## 2024-09-08 RX ORDER — MAGNESIUM HYDROXIDE/ALUMINUM HYDROXICE/SIMETHICONE 120; 1200; 1200 MG/30ML; MG/30ML; MG/30ML
30 SUSPENSION ORAL EVERY 4 HOURS PRN
Status: DISCONTINUED | OUTPATIENT
Start: 2024-09-08 | End: 2024-09-08 | Stop reason: HOSPADM

## 2024-09-08 RX ORDER — ASPIRIN 81 MG/1
81 TABLET, CHEWABLE ORAL ONCE
Status: DISCONTINUED | OUTPATIENT
Start: 2024-09-08 | End: 2024-09-08

## 2024-09-08 RX ORDER — AMLODIPINE BESYLATE 10 MG/1
10 TABLET ORAL EVERY MORNING
Status: DISCONTINUED | OUTPATIENT
Start: 2024-09-08 | End: 2024-09-08 | Stop reason: HOSPADM

## 2024-09-08 RX ORDER — SODIUM CHLORIDE 9 MG/ML
3 INJECTION INTRAVENOUS
Status: DISCONTINUED | OUTPATIENT
Start: 2024-09-08 | End: 2024-09-08

## 2024-09-08 RX ORDER — DIVALPROEX SODIUM 500 MG/1
1000 TABLET, EXTENDED RELEASE ORAL EVERY 12 HOURS
Status: DISCONTINUED | OUTPATIENT
Start: 2024-09-08 | End: 2024-09-08 | Stop reason: HOSPADM

## 2024-09-08 RX ADMIN — PANTOPRAZOLE SODIUM 40 MG: 40 INJECTION, POWDER, FOR SOLUTION INTRAVENOUS at 06:09

## 2024-09-08 RX ADMIN — FAMOTIDINE 20 MG: 10 INJECTION INTRAVENOUS at 08:30

## 2024-09-08 RX ADMIN — KETOROLAC TROMETHAMINE 15 MG: 30 INJECTION, SOLUTION INTRAMUSCULAR; INTRAVENOUS at 04:50

## 2024-09-08 RX ADMIN — KETOROLAC TROMETHAMINE 15 MG: 30 INJECTION, SOLUTION INTRAMUSCULAR; INTRAVENOUS at 01:09

## 2024-09-08 RX ADMIN — AMLODIPINE BESYLATE 10 MG: 10 TABLET ORAL at 08:29

## 2024-09-08 RX ADMIN — NITROGLYCERIN 0.4 MG: 0.4 TABLET SUBLINGUAL at 04:31

## 2024-09-08 NOTE — ASSESSMENT & PLAN NOTE
Presentation: Patient presented to the ED due to complaints of severe, central chest pain without radiation with associated nausea and diaphoresis that started around 2300 last night. He received nitro and ASA en route which improved pain per EMS. Upon ED arrival, patient had an additional episode of chest pain for which he received IV Toradol which patient reports improved and near resolution of chest pain. Once admitted and on the med surg floor, patient reported chest pain for which he received one dose of SL nitro and he reported little improvement in chest pain. Patient ordered IV Toradol. Patient reports working out at least 3 times per week, he denies any recent trauma.   Likely etiology: MSK versus GERD versus Anxiety.  RANDALL:   Initial Troponin: < 2 --> 8 --> 13  Trend x3 or to peak.  Initial EKG: NSR, heart rate 66.   Repeat EKG displayed SR with frequent PVCs, heart rate 101.  Monitor on telemetry.  No further inpt w/u

## 2024-09-08 NOTE — ASSESSMENT & PLAN NOTE
Continue Effexor.  Continue Divalproex Sodium ER 1000 mg BID. Patient reports that he also takes liquid Valproic Acid twice a day but is unsure of dose -- needs to be confirmed with external pharmacy.

## 2024-09-08 NOTE — DISCHARGE SUMMARY
"[facility]  Discharge- Brodie Dutton 1975, 48 y.o. male MRN: [id number]  Unit/Bed#: S -01 Encounter: [id number]  Primary Care Provider: Michel Godinez MD   Date and time admitted to hospital: 9/8/2024 12:02 AM    * Chest pain  Assessment & Plan  Presentation: Patient presented to the ED due to complaints of severe, central chest pain without radiation with associated nausea and diaphoresis that started around 2300 last night. He received nitro and ASA en route which improved pain per EMS. Upon ED arrival, patient had an additional episode of chest pain for which he received IV Toradol which patient reports improved and near resolution of chest pain. Once admitted and on the med surg floor, patient reported chest pain for which he received one dose of SL nitro and he reported little improvement in chest pain. Patient ordered IV Toradol. Patient reports working out at least 3 times per week, he denies any recent trauma.   Likely etiology: MSK versus GERD versus Anxiety.  RANDALL:   Initial Troponin: < 2 --> 8 --> 13  Trend x3 or to peak.  Initial EKG: NSR, heart rate 66.   Repeat EKG displayed SR with frequent PVCs, heart rate 101.  Monitor on telemetry.  No further inpt w/u    Gastroesophageal reflux disease  Assessment & Plan  PTA on Omeprazole.    Prostate cancer (HCC)  Assessment & Plan  Diagnosed in December 2014 -- s/p radical prostatectomy.  Currently on Lupron injections.  Continue outpatient follow up.    HTN (hypertension)  Assessment & Plan  Blood pressure is reviewed and acceptable.  Last recorded pressure: 127/67  Continue Amlodipine and Prazosin.  Monitor blood pressure.    Esophageal diverticulum  Assessment & Plan  Hx of esophageal dysphagia with prior robotic resection of a epiphrenic diverticulum, Heller myotomy and Jaskaran fundoplication and underwent EGD in November 2023 due to intermittent dysphagia at that time.  EGD (11/2023): \"Esophagus appears normal with " "a well-healed staple line from prior diverticulectomy. Very mild narrowing at the GE junction which is at 45 cm but no difficulty the passage of the scope through this area. Normal retroflexed view after Jaskaran fundoplication.\"     Bipolar disorder (HCC)  Assessment & Plan  Continue Effexor.  Continue Divalproex Sodium ER 1000 mg BID. Patient reports that he also takes liquid Valproic Acid twice a day but is unsure of dose -- needs to be confirmed with external pharmacy.    Obesity (BMI 30-39.9)  Assessment & Plan  Encouraged lifestyle modifications.        Medical Problems       Resolved Problems  Date Reviewed: 9/8/2024   None       Discharging Physician / Practitioner: Hemant Ty MD  PCP: Michel Godinez MD  Admission Date:   Admission Orders (From admission, onward)       Ordered        09/08/24 0137  Place in Observation  Once                          Discharge Date: 09/08/24    Consultations During Hospital Stay:  none    Procedures Performed:   None     Significant Findings / Test Results:   Trop: < 2 --> 8 --> 13     Incidental Findings:       Test Results Pending at Discharge (will require follow up):        Outpatient Tests Requested:      Complications:  none     Reason for Admission: chest pain     Hospital Course:   48-year-old male patient who originally presented to the hospital on 9/8/2024 due to an episode of severe substernal chest pain.  Overnight patient's troponins were trended and remained flat.  EKG is unremarkable for signs of ischemia.  Given recent stress testing and patient's ability to participate in rigorous physical activity, low suspicion for ACS at this time.  Discussed using antireflux measures and checking blood pressure if episode were to recur.  Patient has follow-up with his primary cardiologist in early October.  Patient encouraged to keep this appointment or to follow-up sooner if he notices similar symptoms.    Please see above list of diagnoses and related plan for " additional information.     Condition at Discharge: good    Discharge Day Visit / Exam:   * Please refer to separate progress note for these details *    Discussion with Family: Updated  (wife) at bedside.    Discharge instructions/Information to patient and family:   See after visit summary for information provided to patient and family.      Provisions for Follow-Up Care:  See after visit summary for information related to follow-up care and any pertinent home health orders.      Mobility at time of Discharge:   JH-HLM Achieved: 8: Walk 250 feet ot more  HLM Goal achieved. Continue to encourage appropriate mobility.     Disposition:   Home    Planned Readmission: none     Discharge Statement:  I spent  35  minutes discharging the patient. This time was spent on the day of discharge. I had direct contact with the patient on the day of discharge. Greater than 50% of the total time was spent examining patient, answering all patient questions, arranging and discussing plan of care with patient as well as directly providing post-discharge instructions.  Additional time then spent on discharge activities.    Discharge Medications:  See after visit summary for reconciled discharge medications provided to patient and/or family.      **Please Note: This note may have been constructed using a voice recognition system**

## 2024-09-08 NOTE — ASSESSMENT & PLAN NOTE
Diagnosed in December 2014 -- s/p radical prostatectomy.  Currently on Lupron injections.  Continue outpatient follow up.

## 2024-09-08 NOTE — ED PROVIDER NOTES
History  Chief Complaint   Patient presents with    Chest Pain     Chest pain that started about an hour ago. EMS gave ASA and nitro PTA. +nausea     48-year-old male with history of hypertension, prostate cancer presenting for chest pain.  Patient states he was laying in bed about 1 hour ago when he had sudden severe chest pain, diaphoresis, nausea shortness of breath.  Patient provided nitro and aspirin en route which has helped with the pain.  Pain does not radiate.  Pain is worse with inspiration.  No fevers.  No other acute symptoms.        Chest Pain  Associated symptoms: nausea    Associated symptoms: no abdominal pain, no back pain, no cough, no fever, no palpitations, no shortness of breath and not vomiting        Prior to Admission Medications   Prescriptions Last Dose Informant Patient Reported? Taking?   MELATONIN PO   Yes No   Sig: Take 20 mg by mouth daily   acetaminophen (TYLENOL) 650 mg CR tablet   Yes No   Sig: TAKE 1 TABLET BY MOUTH 4 TIMES A DAY FOR 3 DAYS, THEN 4 TIMES A DAY AS NEEDED FOR 4 DAYS.   albuterol (PROVENTIL HFA,VENTOLIN HFA) 90 mcg/act inhaler   Yes No   Sig: INHALE 2 PUFFS BY MOUTH EVERY 6 HOURS AS NEEDED FOR WHEEZE   amLODIPine (NORVASC) 10 mg tablet 2024 Self Yes Yes   Sig: Take 10 mg by mouth every morning   atorvastatin (LIPITOR) 20 mg tablet 2024 Self Yes Yes   Sig: Take 20 mg by mouth daily at bedtime   divalproex sodium (DEPAKOTE ER) 500 mg 24 hr tablet 2024 Self Yes Yes   Si tab po qam and 2 tabs po qhs   famotidine (PEPCID) 20 mg tablet   Yes No   Sig: Take 20 mg by mouth every morning   gabapentin (NEURONTIN) 300 mg capsule   Yes No   Sig: Take 300 mg by mouth 2 (two) times a day   ibuprofen (MOTRIN) 800 mg tablet   Yes No   leuprolide (LUPRON DEPOT 3 MONTH KIT) 22.5 mg injection Past Month Self Yes Yes   Sig: Inject 22.5 mg into a muscle every 6 (six) months Per pt next dose end of 2023   meloxicam (MOBIC) 15 mg tablet   Yes No   Sig: Take 15 mg by mouth  "daily   methocarbamol (ROBAXIN) 750 mg tablet   Yes No   Sig: Take 750 mg by mouth every 6 (six) hours as needed for muscle spasms   methylphenidate (RITALIN) 5 mg tablet  Self Yes No   Sig: if needed   omeprazole (PriLOSEC) 20 mg delayed release capsule   No No   Sig: Take 1 capsule (20 mg total) by mouth daily for 20 days Do not start before May 27, 2024.   prazosin (MINIPRESS) 1 mg capsule 2024 Self Yes Yes   Si tab po qhs x 1 week then 2 tabs qhs   prochlorperazine (COMPAZINE) 10 mg tablet   Yes No   Sig: TAKE 1 TABLET EVERY 6 HOURS X 30 DAYS AS NEEDED FOR NAUSEA AND VOMITING   valproic acid (DEPAKENE) 250 MG/5ML soln 2024  Yes Yes   venlafaxine (EFFEXOR) 37.5 mg tablet 2024  Yes Yes      Facility-Administered Medications: None       Past Medical History:   Diagnosis Date    Anxiety     Arthritis     \"hips and knees\"    Asthma     per pt \"only if has a cold\"    Bipolar disorder (HCC)     Blood in the stool     per pt \"last time had this 2 mths ago or so\"    Cancer (HCC)     prostate    Chronic pain disorder     per pt \"from the Lupron especially lower body\"    Colon polyp     Cracked tooth     lower back right side tooth    Depression     Diverticulosis     Exercises 3 to 4 times per week     treadmill 30 min--light lifting    GERD (gastroesophageal reflux disease)     History of psychiatric treatment     History of therapeutic radiation 2015    x 36 rounds    Hyperlipidemia     Hypertension     Teeth missing     Wears glasses        Past Surgical History:   Procedure Laterality Date    COLONOSCOPY      FRACTURE SURGERY Left     wrist    INGUINAL HERNIA REPAIR  2023    PENILE PROSTHESIS IMPLANT      DC ESOPHAGOGASTRODUODENOSCOPY TRANSORAL DIAGNOSTIC N/A 2023    Procedure: ESOPHAGOGASTRODUODENOSCOPY (EGD);  Surgeon: Jose Iraheta MD;  Location: [facility];  Service: Thoracic    DC ESOPHAGOGASTRODUODENOSCOPY TRANSORAL DIAGNOSTIC N/A 2023    Procedure: " ESOPHAGOGASTRODUODENOSCOPY (EGD);  Surgeon: Jose Iraheta MD;  Location: [facility];  Service: Thoracic    CT LAPS ESOPHAGOMYOTOMY W/FUNDOPLASTY IF PERFORMED N/A 02/03/2023    Procedure: robotic assisted laparoscopic esophageal myotomy w/ partial fundoplication;  Surgeon: Jose Iraheta MD;  Location: [facility];  Service: Thoracic    PROSTATECTOMY  2014    UPPER GASTROINTESTINAL ENDOSCOPY      US GUIDED FINE NEEDLE ASPIRATION (ALL INC)  12/16/2014       Family History   Problem Relation Age of Onset    Cancer Other     Hypertension Other     Hyperlipidemia Other      I have reviewed and agree with the history as documented.    E-Cigarette/Vaping    E-Cigarette Use Never User      E-Cigarette/Vaping Substances     Social History     Tobacco Use    Smoking status: Never    Smokeless tobacco: Never   Vaping Use    Vaping status: Never Used   Substance Use Topics    Alcohol use: Not Currently    Drug use: Never        Review of Systems   Constitutional:  Negative for chills and fever.   Respiratory:  Negative for cough and shortness of breath.    Cardiovascular:  Positive for chest pain. Negative for palpitations.   Gastrointestinal:  Positive for nausea. Negative for abdominal pain and vomiting.   Musculoskeletal:  Negative for arthralgias and back pain.   Skin:  Negative for color change and rash.   Neurological:  Negative for syncope.   All other systems reviewed and are negative.      Physical Exam  ED Triage Vitals   Temperature Pulse Respirations Blood Pressure SpO2   09/08/24 0003 09/08/24 0003 09/08/24 0003 09/08/24 0006 09/08/24 0003   98 °F (36.7 °C) 61 18 134/76 95 %      Temp Source Heart Rate Source Patient Position - Orthostatic VS BP Location FiO2 (%)   09/08/24 0003 -- -- -- --   Oral          Pain Score       09/08/24 0006       4             Orthostatic Vital Signs  Vitals:    09/08/24 0003 09/08/24 0006   BP:  134/76   Pulse: 61        Physical Exam  Vitals and nursing note  reviewed.   Constitutional:       General: He is not in acute distress.     Appearance: He is well-developed.   HENT:      Head: Normocephalic and atraumatic.   Eyes:      Conjunctiva/sclera: Conjunctivae normal.   Cardiovascular:      Rate and Rhythm: Normal rate and regular rhythm.      Heart sounds: Normal heart sounds. No murmur heard.  Pulmonary:      Effort: Pulmonary effort is normal. No respiratory distress.      Breath sounds: Normal breath sounds.   Chest:      Chest wall: Tenderness (bilateral chest) present. No mass, deformity or crepitus.   Abdominal:      Palpations: Abdomen is soft.      Tenderness: There is no abdominal tenderness.   Musculoskeletal:         General: No swelling.      Cervical back: Neck supple.   Skin:     General: Skin is warm and dry.      Capillary Refill: Capillary refill takes less than 2 seconds.   Neurological:      Mental Status: He is alert.   Psychiatric:         Mood and Affect: Mood normal.         ED Medications  Medications   sodium chloride (PF) 0.9 % injection 3 mL (has no administration in time range)   ketorolac (TORADOL) injection 15 mg (15 mg Intravenous Given 9/8/24 0109)       Diagnostic Studies  Results Reviewed       Procedure Component Value Units Date/Time    HS Troponin 0hr (reflex protocol) [[id number]]  (Normal) Collected: 09/08/24 0013    Lab Status: Final result Specimen: Blood from Arm, Right Updated: 09/08/24 0041     hs TnI 0hr <2 ng/L     HS Troponin I 2hr [[id number]]     Lab Status: No result Specimen: Blood     Basic metabolic panel [[id number]] Collected: 09/08/24 0013    Lab Status: Final result Specimen: Blood from Arm, Right Updated: 09/08/24 0037     Sodium 140 mmol/L      Potassium 3.7 mmol/L      Chloride 107 mmol/L      CO2 27 mmol/L      ANION GAP 6 mmol/L      BUN 17 mg/dL      Creatinine 1.00 mg/dL      Glucose 120 mg/dL      Calcium 8.7 mg/dL      eGFR 87 ml/min/1.73sq m     Narrative:      National Kid[employer] guidelines  for Chronic Kidney Disease (CKD):     Stage 1 with normal or high GFR (GFR > 90 mL/min/1.73 square meters)    Stage 2 Mild CKD (GFR = 60-89 mL/min/1.73 square meters)    Stage 3A Moderate CKD (GFR = 45-59 mL/min/1.73 square meters)    Stage 3B Moderate CKD (GFR = 30-44 mL/min/1.73 square meters)    Stage 4 Severe CKD (GFR = 15-29 mL/min/1.73 square meters)    Stage 5 End Stage CKD (GFR <15 mL/min/1.73 square meters)  Note: GFR calculation is accurate only with a steady state creatinine    CBC and differential [[id number]]  (Abnormal) Collected: 09/08/24 0013    Lab Status: Final result Specimen: Blood from Arm, Right Updated: 09/08/24 0019     WBC 5.94 Thousand/uL      RBC 4.61 Million/uL      Hemoglobin 12.1 g/dL      Hematocrit 37.2 %      MCV 81 fL      MCH 26.2 pg      MCHC 32.5 g/dL      RDW 15.9 %      MPV 9.9 fL      Platelets 282 Thousands/uL      nRBC 0 /100 WBCs      Segmented % 43 %      Immature Grans % 0 %      Lymphocytes % 45 %      Monocytes % 8 %      Eosinophils Relative 3 %      Basophils Relative 1 %      Absolute Neutrophils 2.56 Thousands/µL      Absolute Immature Grans 0.01 Thousand/uL      Absolute Lymphocytes 2.71 Thousands/µL      Absolute Monocytes 0.48 Thousand/µL      Eosinophils Absolute 0.15 Thousand/µL      Basophils Absolute 0.03 Thousands/µL                    X-ray chest 1 view portable   ED Interpretation by Nelson Patterson MD (09/08 0024)   No acute cardiopulmonary findings.            Procedures  ECG 12 Lead Documentation Only    Date/Time: 9/8/2024 12:21 AM    Performed by: Nelson Patterson MD  Authorized by: Nelson Patterson MD    ECG reviewed by me, the ED Provider: yes    Patient location:  ED  Previous ECG:     Previous ECG:  Compared to current    Similarity:  No change  Interpretation:     Interpretation: normal    Rate:     ECG rate assessment: normal    Rhythm:     Rhythm: sinus rhythm    Ectopy:     Ectopy: none    QRS:     QRS axis:  Normal    QRS intervals:  Normal  Conduction:      Conduction: normal    ST segments:     ST segments:  Normal  T waves:     T waves: normal          ED Course  ED Course as of 09/08/24 0138   Sun Sep 08, 2024   0014 48-year-old male with history of CAD, hypertension, prostate cancer presenting for chest pain.  Patient states he was laying in bed about 1 hour ago when he had sudden severe chest pain, diaphoresis, nausea shortness of breath.  Patient provided nitro and aspirin en route which has helped with the pain.  Pain does not radiate.  Pain is worse with inspiration.  No fevers.  No other acute symptoms.   0017 EKG obtained with no acute findings.   0017 Differential times includes ACS spectrum, MSK pain, anxiety.   0024 Chest x-ray obtained with no acute cardiopulmonary findings.   0040 hs TnI 0hr: <2  Pending 2 hr   0113 Patient reported return of chest pain which was relayed by nursing.  New EKG obtained demonstrating frequent PVCs with bigeminy pattern.  In the setting, patient discussed with internal medicine for ACS rule out.  Plan to admit for observation.             HEART Risk Score      Flowsheet Row Most Recent Value   Heart Score Risk Calculator    History 1 Filed at: 09/08/2024 0106   ECG 1 Filed at: 09/08/2024 0106   Age 1 Filed at: 09/08/2024 0106   Risk Factors 2 Filed at: 09/08/2024 0106   Troponin 0 Filed at: 09/08/2024 0106   HEART Score 5 Filed at: 09/08/2024 0106                        SBIRT 20yo+      Flowsheet Row Most Recent Value   Initial Alcohol Screen: US AUDIT-C     1. How often do you have a drink containing alcohol? 0 Filed at: 09/08/2024 0004   2. How many drinks containing alcohol do you have on a typical day you are drinking?  0 Filed at: 09/08/2024 0004   3a. Male UNDER 65: How often do you have five or more drinks on one occasion? 0 Filed at: 09/08/2024 0004   Audit-C Score 0 Filed at: 09/08/2024 0004   AQUILES: How many times in the past year have you...    Used an illegal drug or used a prescription medication for  non-medical reasons? Never Filed at: 09/08/2024 0004                  Medical Decision Making  See ED course.    Amount and/or Complexity of Data Reviewed  Labs: ordered. Decision-making details documented in ED Course.  Radiology: ordered and independent interpretation performed.    Risk  Prescription drug management.  Decision regarding hospitalization.          Disposition  Final diagnoses:   Chest pain     Time reflects when diagnosis was documented in both MDM as applicable and the Disposition within this note       Time User Action Codes Description Comment    9/8/2024  1:34 AM Nelson Patterson Add [R07.9] Chest pain           ED Disposition       ED Disposition   Admit    Condition   Stable    Date/Time   Sun Sep 8, 2024 0134    Comment   Case was discussed with Milvia Chen MD and the patient's admission status was agreed to be Admission Status: observation status to the service of Dr. Cheema .               Follow-up Information    None         Patient's Medications   Discharge Prescriptions    No medications on file     No discharge procedures on file.    PDMP Review         Value Time User    PDMP Reviewed  Yes 5/25/2024 10:00 PM Stan Deleon MD             ED Provider  Attending physically available and evaluated Brodie Dutton. I managed the patient along with the ED Attending.    Electronically Signed by           Nelson Patterson MD  09/08/24 0138

## 2024-09-08 NOTE — DISCHARGE INSTR - AVS FIRST PAGE
Dear Brodie Dutton,     It was our pleasure to care for you here at [facility].  It is our hope that we were always able to exceed the expected standards for your care during your stay.  You were hospitalized due to chest pain.  You were cared for on the 3rd floor under the service of Hemant Ty MD with the [facility] who covers for your primary care physician (PCP), Michel Godinez MD, while you were hospitalized.  If you have any questions or concerns related to this hospitalization, you may contact us at .  For follow up as well as medication refills, we recommend that you follow up with your primary care physician.  A registered nurse will reach out to you by phone within a few days after your discharge to answer any additional questions that you may have after going home.  However, at this time we provide for you here, the most important instructions / recommendations at discharge:     Notable Medication Adjustments -   No changes to medications at this time  Testing Required after Discharge -   None   ** Please contact your PCP to request testing orders for any of the testing recommended here **  Important follow up information -   Please follow-up with your PCP and cardiologist as scheduled  Other Instructions -   If you have any recurrence of your pain, please try taking antacids to see if pain improves.  If you are able to, check your blood pressure during the episode. If it is elevated (top number greater than 180), please contact your PCP or return to the ED  Please review this entire after visit summary as additional general instructions including medication list, appointments, activity, diet, any pertinent wound care, and other additional recommendations from your care team that may be provided for you.      Sincerely,     Hemant Ty MD

## 2024-09-08 NOTE — ASSESSMENT & PLAN NOTE
Blood pressure is reviewed and acceptable.  Last recorded pressure: 127/67  Continue Amlodipine and Prazosin.  Monitor blood pressure.

## 2024-09-08 NOTE — ASSESSMENT & PLAN NOTE
"Hx of esophageal dysphagia with prior robotic resection of a epiphrenic diverticulum, Heller myotomy and Jaskaran fundoplication and underwent EGD in November 2023 due to intermittent dysphagia at that time.  EGD (11/2023): \"Esophagus appears normal with a well-healed staple line from prior diverticulectomy. Very mild narrowing at the GE junction which is at 45 cm but no difficulty the passage of the scope through this area. Normal retroflexed view after Jaskaran fundoplication.\"   "

## 2024-09-08 NOTE — ED ATTENDING ATTESTATION
9/7/2024  I, John Yanez MD, saw and evaluated the patient. I have discussed the patient with the resident/non-physician practitioner and agree with the resident's/non-physician practitioner's findings, Plan of Care, and MDM as documented in the resident's/non-physician practitioner's note, except where noted. All available labs and Radiology studies were reviewed.  I was present for key portions of any procedure(s) performed by the resident/non-physician practitioner and I was immediately available to provide assistance.       At this point I agree with the current assessment done in the Emergency Department.  I have conducted an independent evaluation of this patient a history and physical is as follows:    48-year-old male presented for evaluation of chest pain associated with some nausea, diaphoresis, shortness of breath this evening less than an hour prior to arrival.  States he was just getting ready for bed at the time, nothing strenuous.  He has had some episodes of chest pain in the past but reports it has never been as intense as tonight.    Follows with cardiology for hypertension, hyperlipidemia, coronary disease.  He had a normal stress echo 3 months ago.    At time my evaluation he reports his symptoms have improved.  He was given 325 mg aspirin by EMS prior to arrival.  Will check labs, EKG, continue to monitor and reevaluate.    ED Course  ED Course as of 09/08/24 0116   Sun Sep 08, 2024   0041 hs TnI 0hr: <2  Will repeat in 2 hours.   0107 Initial EKG showed diffuse T wave flattening compared to previous.    Patient reported current pain in the emergency department.  Repeat EKG is notable for frequent PVCs not present on previous.     Given risk factors, recurrent symptoms, new ectopy, recommending admission on telemetry for serial troponins and further workup and management.    Critical Care Time  Procedures

## 2024-09-08 NOTE — H&P
"Critical access hospital  H&P  Name: Brodie Dutton 48 y.o. male I MRN: [id number]  Unit/Bed#: S -01 I Date of Admission: 9/8/2024   Date of Service: 9/8/2024 I Hospital Day: 0      Assessment & Plan   * Chest pain  Assessment & Plan  Presentation: Patient presented to the ED due to complaints of severe, central chest pain without radiation with associated nausea and diaphoresis that started around 2300 last night. He received nitro and ASA en route which improved pain per EMS. Upon ED arrival, patient had an additional episode of chest pain for which he received IV Toradol which patient reports improved and near resolution of chest pain. Once admitted and on the med surg floor, patient reported chest pain for which he received one dose of SL nitro and he reported little improvement in chest pain. Patient ordered IV Toradol. Patient reports working out at least 3 times per week, he denies any recent trauma.   Likely etiology: MSK versus GERD versus Anxiety.  RANDALL:   Initial Troponin: < 2 --> 8 --> 13  Trend x3 or to peak.  Initial EKG: NSR, heart rate 66.   Repeat EKG displayed SR with frequent PVCs, heart rate 101.  Monitor on telemetry.  ATC Tylenol and IV Toradol prn.  GI cocktail.  Check fasting lipid panel and A1c.    Admit under Observation Status.    Gastroesophageal reflux disease  Assessment & Plan  PTA on Omeprazole.  Will change to IV Pantoprazole while inpatient.    Esophageal diverticulum  Assessment & Plan  Hx of esophageal dysphagia with prior robotic resection of a epiphrenic diverticulum, Heller myotomy and Jaskaran fundoplication and underwent EGD in November 2023 due to intermittent dysphagia at that time.  EGD (11/2023): \"Esophagus appears normal with a well-healed staple line from prior diverticulectomy. Very mild narrowing at the GE junction which is at 45 cm but no difficulty the passage of the scope through this area. Normal retroflexed view after Jaskaran fundoplication.\" "     Prostate cancer (HCC)  Assessment & Plan  Diagnosed in December 2014 -- s/p radical prostatectomy.  Currently on Lupron injections.  Continue outpatient follow up.    HTN (hypertension)  Assessment & Plan  Blood pressure is reviewed and acceptable.  Last recorded pressure: 127/67  Continue Amlodipine and Prazosin.  Monitor blood pressure.    Obesity (BMI 30-39.9)  Assessment & Plan  Encouraged lifestyle modifications.    Bipolar disorder (HCC)  Assessment & Plan  Continue Effexor.  Continue Divalproex Sodium ER 1000 mg BID. Patient reports that he also takes liquid Valproic Acid twice a day but is unsure of dose -- needs to be confirmed with external pharmacy.           VTE Pharmacologic Prophylaxis: VTE Score: 2 Low Risk (Score 0-2) - Encourage Ambulation.  Code Status: Level 1 - Full Code per patient  Discussion with family: Patient declined call to .     Anticipated Length of Stay: Patient will be admitted on an observation basis with an anticipated length of stay of less than 2 midnights secondary to chest pain.    Total Time Spent on Date of Encounter in care of patient: 70 mins. This time was spent on one or more of the following: performing physical exam; counseling and coordination of care; obtaining or reviewing history; documenting in the medical record; reviewing/ordering tests, medications or procedures; communicating with other healthcare professionals and discussing with patient's family/caregivers.    Chief Complaint: Chest pain    History of Present Illness:  Brodie Dutton is a 48 y.o. male with a PMH of anxiety, depression, bipolar disorder, HTN, HLD, GERD and esophageal diverticulum who presents with complaints of severe, central chest pain without radiation with associated nausea and diaphoresis that started around 2300 last night. He received nitro and ASA en route which improved pain per EMS. Upon ED arrival, patient had an additional episode of chest pain for which he  "received IV Toradol which patient reports improved and near resolution of chest pain. Once admitted and on the med surg floor, patient reported chest pain for which he received one dose of SL nitro and he reported little improvement in chest pain. Patient ordered IV Toradol. Patient reports working out at least 3 times per week, he denies any recent trauma.     Review of Systems:  Review of Systems   Constitutional:  Negative for chills and fever.   Respiratory:  Negative for chest tightness and shortness of breath.    Cardiovascular:  Positive for chest pain. Negative for palpitations.   Gastrointestinal:  Positive for nausea. Negative for abdominal pain and vomiting.   Musculoskeletal:  Negative for back pain.   Neurological:  Negative for dizziness.   All other systems reviewed and are negative.      Past Medical and Surgical History:   Past Medical History:   Diagnosis Date    Anxiety     Arthritis     \"hips and knees\"    Asthma     per pt \"only if has a cold\"    Bipolar disorder (HCC)     Blood in the stool     per pt \"last time had this 2 mths ago or so\"    Cancer (HCC) 2014    prostate    Chronic pain disorder     per pt \"from the Lupron especially lower body\"    Colon polyp     Cracked tooth     lower back right side tooth    Depression     Diverticulosis     Exercises 3 to 4 times per week     treadmill 30 min--light lifting    GERD (gastroesophageal reflux disease)     History of psychiatric treatment     History of therapeutic radiation 2015    x 36 rounds    Hyperlipidemia     Hypertension     Teeth missing     Wears glasses        Past Surgical History:   Procedure Laterality Date    COLONOSCOPY      FRACTURE SURGERY Left     wrist    INGUINAL HERNIA REPAIR  08/01/2023    PENILE PROSTHESIS IMPLANT      OH ESOPHAGOGASTRODUODENOSCOPY TRANSORAL DIAGNOSTIC N/A 02/03/2023    Procedure: ESOPHAGOGASTRODUODENOSCOPY (EGD);  Surgeon: Jose Iraheta MD;  Location: [facility];  Service: Thoracic    OH " ESOPHAGOGASTRODUODENOSCOPY TRANSORAL DIAGNOSTIC N/A 11/2/2023    Procedure: ESOPHAGOGASTRODUODENOSCOPY (EGD);  Surgeon: Jose Iraheta MD;  Location: [facility];  Service: Thoracic    CO LAPS ESOPHAGOMYOTOMY W/FUNDOPLASTY IF PERFORMED N/A 02/03/2023    Procedure: robotic assisted laparoscopic esophageal myotomy w/ partial fundoplication;  Surgeon: Jose Iraheta MD;  Location: [facility];  Service: Thoracic    PROSTATECTOMY  2014    UPPER GASTROINTESTINAL ENDOSCOPY      US GUIDED FINE NEEDLE ASPIRATION (ALL INC)  12/16/2014       Meds/Allergies:  Prior to Admission medications    Medication Sig Start Date End Date Taking? Authorizing Provider   acetaminophen (TYLENOL) 650 mg CR tablet TAKE 1 TABLET BY MOUTH 4 TIMES A DAY FOR 3 DAYS, THEN 4 TIMES A DAY AS NEEDED FOR 4 DAYS. 8/1/23  Yes Historical Provider, MD   albuterol (PROVENTIL HFA,VENTOLIN HFA) 90 mcg/act inhaler INHALE 2 PUFFS BY MOUTH EVERY 6 HOURS AS NEEDED FOR WHEEZE 7/27/22  Yes Historical Provider, MD   amLODIPine (NORVASC) 10 mg tablet Take 10 mg by mouth every morning 8/15/11  Yes Historical Provider, MD   atorvastatin (LIPITOR) 20 mg tablet Take 20 mg by mouth daily at bedtime 8/24/21  Yes Historical Provider, MD   divalproex sodium (DEPAKOTE ER) 500 mg 24 hr tablet 2 tab po qam and 2 tabs po qhs 10/27/21  Yes Historical Provider, MD   leuprolide (LUPRON DEPOT 3 MONTH KIT) 22.5 mg injection Inject 22.5 mg into a muscle every 6 (six) months Per pt next dose end of 2/2023   Yes Historical Provider, MD   MELATONIN PO Take 20 mg by mouth daily   Yes Historical Provider, MD   meloxicam (MOBIC) 15 mg tablet Take 15 mg by mouth daily   Yes Historical Provider, MD   methocarbamol (ROBAXIN) 750 mg tablet Take 750 mg by mouth every 6 (six) hours as needed for muscle spasms   Yes Historical Provider, MD   omeprazole (PriLOSEC) 20 mg delayed release capsule Take 1 capsule (20 mg total) by mouth daily for 20 days Do not start before May 27,  2024. 5/27/24 9/8/24 Yes Stan Deleon MD   valproic acid (DEPAKENE) 250 MG/5ML soln  8/9/23  Yes Historical Provider, MD   venlafaxine (EFFEXOR) 37.5 mg tablet  8/9/23  Yes Historical Provider, MD   famotidine (PEPCID) 20 mg tablet Take 20 mg by mouth every morning 8/1/23 7/31/24  Historical Provider, MD   gabapentin (NEURONTIN) 300 mg capsule Take 300 mg by mouth 2 (two) times a day  Patient not taking: Reported on 9/8/2024 5/18/23   Historical Provider, MD   ibuprofen (MOTRIN) 800 mg tablet  8/1/23   Historical Provider, MD   methylphenidate (RITALIN) 5 mg tablet if needed  Patient not taking: Reported on 9/8/2024 8/12/22   Historical Provider, MD   prazosin (MINIPRESS) 1 mg capsule 1 tab po qhs x 1 week then 2 tabs qhs  Patient not taking: Reported on 9/8/2024 10/27/21   Historical Provider, MD   prochlorperazine (COMPAZINE) 10 mg tablet TAKE 1 TABLET EVERY 6 HOURS X 30 DAYS AS NEEDED FOR NAUSEA AND VOMITING  Patient not taking: Reported on 9/8/2024 9/12/22   Historical Provider, MD     I have reviewed home medications with patient personally.    Allergies:   Allergies   Allergen Reactions    Lisinopril Swelling    Amoxicillin Diarrhea and Other (See Comments)       Social History:  Marital Status: /Civil Union   Occupation: Unknown  Patient Pre-hospital Living Situation: Home  Patient Pre-hospital Level of Mobility: walks  Patient Pre-hospital Diet Restrictions: None  Substance Use History:   Social History     Substance and Sexual Activity   Alcohol Use Not Currently     Social History     Tobacco Use   Smoking Status Never   Smokeless Tobacco Never     Social History     Substance and Sexual Activity   Drug Use Never       Family History:  Family History   Problem Relation Age of Onset    Cancer Other     Hypertension Other     Hyperlipidemia Other        Physical Exam:     Vitals:   Blood Pressure: 118/75 (09/08/24 0434)  Pulse: (!) 50 (09/08/24 0434)  Temperature: 98.3 °F (36.8 °C) (09/08/24  0213)  Temp Source: Oral (09/08/24 0003)  Respirations: 18 (09/08/24 0213)  Weight - Scale: 113 kg (249 lb 12.5 oz) (09/08/24 0003)  SpO2: 95 % (09/08/24 0434)    Physical Exam  Vitals and nursing note reviewed.   Constitutional:       General: He is not in acute distress.     Appearance: He is obese. He is not ill-appearing or diaphoretic.   HENT:      Head: Normocephalic.      Nose: Nose normal.      Mouth/Throat:      Pharynx: Oropharynx is clear.   Eyes:      General: No scleral icterus.     Extraocular Movements: Extraocular movements intact.      Conjunctiva/sclera: Conjunctivae normal.   Cardiovascular:      Rate and Rhythm: Regular rhythm. Bradycardia present.      Pulses: Normal pulses.      Heart sounds: Normal heart sounds. No murmur heard.  Pulmonary:      Effort: Pulmonary effort is normal. No respiratory distress.      Breath sounds: Normal breath sounds. No wheezing, rhonchi or rales.   Chest:      Chest wall: No tenderness.   Abdominal:      General: Bowel sounds are normal. There is no distension.      Palpations: Abdomen is soft.      Tenderness: There is no abdominal tenderness.   Musculoskeletal:         General: Normal range of motion.   Skin:     General: Skin is warm and dry.   Neurological:      Mental Status: He is alert and oriented to person, place, and time.   Psychiatric:         Speech: Speech normal.          Additional Data:     Lab Results:  Results from last 7 days   Lab Units 09/08/24  0500   WBC Thousand/uL 5.33   HEMOGLOBIN g/dL 12.1   HEMATOCRIT % 37.2   PLATELETS Thousands/uL 264   SEGS PCT % 55   LYMPHO PCT % 36   MONO PCT % 7   EOS PCT % 2     Results from last 7 days   Lab Units 09/08/24  0500   SODIUM mmol/L 141   POTASSIUM mmol/L 4.1   CHLORIDE mmol/L 109*   CO2 mmol/L 25   BUN mg/dL 15   CREATININE mg/dL 0.88   ANION GAP mmol/L 7   CALCIUM mg/dL 8.7   ALBUMIN g/dL 4.0   TOTAL BILIRUBIN mg/dL 0.31   ALK PHOS U/L 46   ALT U/L 31   AST U/L 25   GLUCOSE RANDOM mg/dL 95              Lab Results   Component Value Date    HGBA1C 5.8 (H) 08/31/2020           Lines/Drains:  Invasive Devices       Peripheral Intravenous Line  Duration             Peripheral IV 09/08/24 Right Antecubital <1 day                        Imaging: Personally reviewed the following imaging: chest xray  X-ray chest 1 view portable   ED Interpretation by Nelson Patterson MD (09/08 0024)   No acute cardiopulmonary findings.          EKG and Other Studies Reviewed on Admission:   EKG: NSR. HR 66.    ** Please Note: This note has been constructed using a voice recognition system. **

## 2024-09-08 NOTE — ASSESSMENT & PLAN NOTE
Presentation: Patient presented to the ED due to complaints of severe, central chest pain without radiation with associated nausea and diaphoresis that started around 2300 last night. He received nitro and ASA en route which improved pain per EMS. Upon ED arrival, patient had an additional episode of chest pain for which he received IV Toradol which patient reports improved and near resolution of chest pain. Once admitted and on the [facility] surg floor, patient reported chest pain for which he received one dose of SL nitro and he reported little improvement in chest pain. Patient ordered IV Toradol. Patient reports working out at least 3 times per week, he denies any recent trauma.   Likely etiology: MSK versus GERD versus Anxiety.  RANDALL:   Initial Troponin: < 2 --> 8 --> 13  Trend x3 or to peak.  Initial EKG: NSR, heart rate 66.   Repeat EKG displayed SR with frequent PVCs, heart rate 101.  Monitor on telemetry.  ATC Tylenol and IV Toradol prn.  GI cocktail.  Check fasting lipid panel and A1c.    Admit under Observation Status.

## 2024-09-08 NOTE — ASSESSMENT & PLAN NOTE
*Provider Impression*  Patient is a 87 year y/o male who is seen today for management of multiple medical problems  #T2DM - lantus 10units QHS, glipizide 5mg daily; tradjenta 5mg daily, metformin 1000mg BID, empagliflozin 10mg daily  #BPH - finasteride 5mg daily, tamsulosin 0.4mg QPM  #HTN / CAD - atorvastatin 5mg daily, lisinopril 10mg daily  #Vitamin D deficiency - vitamin D 2000units daily  #Hypothyroidism - levothyroxine 88mcg daily  #Dementia - mgmt per [employer] - risperidone, trileptal, lorazepam  #ACP - Full Code  Follow up as needed      *Chief Complaint*  edema / hematuria     *History of Present Illness*  Pt is an 88 y/o male LTC resident of [facility] w/ PMH as below who is seen today for follow up and management of multiple medical problems.    Recent UA was negative.     He is seen sitting up by the nurse's station eating dinner. He is aphasic and unable to provide any history but he appears in NAD.     Allergies - Tuberculin  PMH - HTN, CAD, T2DM, Hypothyroidism, BPH  PSH - unobtainable  FH - unobtainable  SocHx - No EtOH, non smoker      *Review of Systems*  All other systems reviewed are negative except as noted in the HPI      *Vitals*  Date: 12/6/23 - T: 98.0 P: 78 R: 18 BP:   SpO2: 98% on RA; Date: 12/1/23 - Wt: 185     *Results/Data*  CBC - Date: 9/19/23 WBC: 4.1 HGB: 11.8 HCT: 37.3 PLT: 296 ;   BMP - Date: 9/19/23 Na: 141 K: 4.8 Cl: 105 CO2: 25 BUN: 23 Cr: 1.1 Glu: 115 Ca: 9.0 Alb: 3.7 ;   LFT - Date: 9/19/23 AST: 19 ALT: 13 ALP: 57 Tbili: 0.7 ;   Coags - Date: INR: PT:  Other - Date: 5/19/20 - TSH: 1.430, HgbA1c: 6.6%; 11/12/20 - CRP: 25.7 D-Dimer: 1.31; 10/4/21 - A1c: 12.7 TSH: 3.100 25-OH-D: 14.29; 3/29/22 - TSH: 2.69; 4/26/22 - TSH: 2.44 25-OH-D: 26.44 A1c: 9.7 ; 9/19/23 - A1c: 8.7% TSH: 3.272  25-OH-D: 33.93    *Physical Exam*  Gen: (+) NAD, (+) well-appearing  HEENT: (+) normocephalic, (+) MMM  Neck: (+) supple  Lungs: (+) CTAB, (-) wheezes, (-) rales, (-) rhonchi  Heart: (+)  PTA on Omeprazole.   RRR, (+) S1 S2, (-) murmurs  Pulses: (+) palpable  Abd: (+) soft, (+) NT, (+) ND, (+) BS+  Ext: (-) edema, (-) deformity  MSK: (-) joint swelling  Skin: (+) warm, (+) dry, (-) rash  Neuro: (+) follows commands, (-) tremor, (+) alert

## 2024-10-07 ENCOUNTER — APPOINTMENT (EMERGENCY)
Dept: RADIOLOGY | Facility: HOSPITAL | Age: 49
End: 2024-10-07
Payer: COMMERCIAL

## 2024-10-07 ENCOUNTER — APPOINTMENT (EMERGENCY)
Dept: CT IMAGING | Facility: HOSPITAL | Age: 49
End: 2024-10-07
Payer: COMMERCIAL

## 2024-10-07 ENCOUNTER — HOSPITAL ENCOUNTER (EMERGENCY)
Facility: HOSPITAL | Age: 49
Discharge: HOME | End: 2024-10-07
Attending: EMERGENCY MEDICINE
Payer: COMMERCIAL

## 2024-10-07 VITALS
HEART RATE: 59 BPM | OXYGEN SATURATION: 98 % | TEMPERATURE: 98.1 F | DIASTOLIC BLOOD PRESSURE: 73 MMHG | SYSTOLIC BLOOD PRESSURE: 158 MMHG | RESPIRATION RATE: 18 BRPM

## 2024-10-07 DIAGNOSIS — R10.13 EPIGASTRIC PAIN: ICD-10-CM

## 2024-10-07 DIAGNOSIS — R07.89 ATYPICAL CHEST PAIN: Primary | ICD-10-CM

## 2024-10-07 LAB
2HR DELTA HS TROPONIN: >2 NG/L
ALBUMIN SERPL BCG-MCNC: 4.2 G/DL (ref 3.5–5)
ALP SERPL-CCNC: 58 U/L (ref 34–104)
ALT SERPL W P-5'-P-CCNC: 29 U/L (ref 7–52)
ANION GAP SERPL CALCULATED.3IONS-SCNC: 4 MMOL/L (ref 4–13)
AST SERPL W P-5'-P-CCNC: 25 U/L (ref 13–39)
ATRIAL RATE: 62 BPM
BASOPHILS # BLD AUTO: 0.02 THOUSANDS/ΜL (ref 0–0.1)
BASOPHILS NFR BLD AUTO: 0 % (ref 0–1)
BILIRUB SERPL-MCNC: 0.43 MG/DL (ref 0.2–1)
BUN SERPL-MCNC: 11 MG/DL (ref 5–25)
CALCIUM SERPL-MCNC: 9 MG/DL (ref 8.4–10.2)
CARDIAC TROPONIN I PNL SERPL HS: 4 NG/L
CARDIAC TROPONIN I PNL SERPL HS: <2 NG/L
CHLORIDE SERPL-SCNC: 106 MMOL/L (ref 96–108)
CO2 SERPL-SCNC: 28 MMOL/L (ref 21–32)
CREAT SERPL-MCNC: 1.02 MG/DL (ref 0.6–1.3)
EOSINOPHIL # BLD AUTO: 0.09 THOUSAND/ΜL (ref 0–0.61)
EOSINOPHIL NFR BLD AUTO: 1 % (ref 0–6)
ERYTHROCYTE [DISTWIDTH] IN BLOOD BY AUTOMATED COUNT: 15.7 % (ref 11.6–15.1)
GFR SERPL CREATININE-BSD FRML MDRD: 85 ML/MIN/1.73SQ M
GLUCOSE SERPL-MCNC: 83 MG/DL (ref 65–140)
HCT VFR BLD AUTO: 41.5 % (ref 36.5–49.3)
HGB BLD-MCNC: 13.3 G/DL (ref 12–17)
IMM GRANULOCYTES # BLD AUTO: 0.01 THOUSAND/UL (ref 0–0.2)
IMM GRANULOCYTES NFR BLD AUTO: 0 % (ref 0–2)
LIPASE SERPL-CCNC: 34 U/L (ref 11–82)
LYMPHOCYTES # BLD AUTO: 2.21 THOUSANDS/ΜL (ref 0.6–4.47)
LYMPHOCYTES NFR BLD AUTO: 35 % (ref 14–44)
MCH RBC QN AUTO: 26.2 PG (ref 26.8–34.3)
MCHC RBC AUTO-ENTMCNC: 32 G/DL (ref 31.4–37.4)
MCV RBC AUTO: 82 FL (ref 82–98)
MONOCYTES # BLD AUTO: 0.45 THOUSAND/ΜL (ref 0.17–1.22)
MONOCYTES NFR BLD AUTO: 7 % (ref 4–12)
NEUTROPHILS # BLD AUTO: 3.52 THOUSANDS/ΜL (ref 1.85–7.62)
NEUTS SEG NFR BLD AUTO: 57 % (ref 43–75)
NRBC BLD AUTO-RTO: 0 /100 WBCS
P AXIS: 53 DEGREES
PLATELET # BLD AUTO: 278 THOUSANDS/UL (ref 149–390)
PMV BLD AUTO: 9.9 FL (ref 8.9–12.7)
POTASSIUM SERPL-SCNC: 4 MMOL/L (ref 3.5–5.3)
PR INTERVAL: 188 MS
PROT SERPL-MCNC: 7.1 G/DL (ref 6.4–8.4)
QRS AXIS: 65 DEGREES
QRSD INTERVAL: 84 MS
QT INTERVAL: 440 MS
QTC INTERVAL: 446 MS
RBC # BLD AUTO: 5.08 MILLION/UL (ref 3.88–5.62)
SODIUM SERPL-SCNC: 138 MMOL/L (ref 135–147)
T WAVE AXIS: 46 DEGREES
VENTRICULAR RATE: 62 BPM
WBC # BLD AUTO: 6.3 THOUSAND/UL (ref 4.31–10.16)

## 2024-10-07 PROCEDURE — 71260 CT THORAX DX C+: CPT

## 2024-10-07 PROCEDURE — 85025 COMPLETE CBC W/AUTO DIFF WBC: CPT | Performed by: EMERGENCY MEDICINE

## 2024-10-07 PROCEDURE — 83690 ASSAY OF LIPASE: CPT | Performed by: EMERGENCY MEDICINE

## 2024-10-07 PROCEDURE — 71046 X-RAY EXAM CHEST 2 VIEWS: CPT

## 2024-10-07 PROCEDURE — 96374 THER/PROPH/DIAG INJ IV PUSH: CPT

## 2024-10-07 PROCEDURE — 36415 COLL VENOUS BLD VENIPUNCTURE: CPT

## 2024-10-07 PROCEDURE — 93005 ELECTROCARDIOGRAM TRACING: CPT

## 2024-10-07 PROCEDURE — 99285 EMERGENCY DEPT VISIT HI MDM: CPT | Performed by: EMERGENCY MEDICINE

## 2024-10-07 PROCEDURE — 93010 ELECTROCARDIOGRAM REPORT: CPT | Performed by: STUDENT IN AN ORGANIZED HEALTH CARE EDUCATION/TRAINING PROGRAM

## 2024-10-07 PROCEDURE — 80053 COMPREHEN METABOLIC PANEL: CPT | Performed by: EMERGENCY MEDICINE

## 2024-10-07 PROCEDURE — 74177 CT ABD & PELVIS W/CONTRAST: CPT

## 2024-10-07 PROCEDURE — 99285 EMERGENCY DEPT VISIT HI MDM: CPT

## 2024-10-07 PROCEDURE — 84484 ASSAY OF TROPONIN QUANT: CPT | Performed by: EMERGENCY MEDICINE

## 2024-10-07 RX ORDER — SUCRALFATE 1 G/1
1 TABLET ORAL 4 TIMES DAILY
Qty: 120 TABLET | Refills: 0 | Status: SHIPPED | OUTPATIENT
Start: 2024-10-07 | End: 2024-11-06

## 2024-10-07 RX ORDER — PANTOPRAZOLE SODIUM 40 MG/1
40 TABLET, DELAYED RELEASE ORAL 2 TIMES DAILY
Qty: 60 TABLET | Refills: 0 | Status: SHIPPED | OUTPATIENT
Start: 2024-10-07 | End: 2024-11-06

## 2024-10-07 RX ORDER — SUCRALFATE 1 G/1
1 TABLET ORAL ONCE
Status: COMPLETED | OUTPATIENT
Start: 2024-10-07 | End: 2024-10-07

## 2024-10-07 RX ORDER — MAGNESIUM HYDROXIDE/ALUMINUM HYDROXICE/SIMETHICONE 120; 1200; 1200 MG/30ML; MG/30ML; MG/30ML
30 SUSPENSION ORAL ONCE
Status: COMPLETED | OUTPATIENT
Start: 2024-10-07 | End: 2024-10-07

## 2024-10-07 RX ORDER — PANTOPRAZOLE SODIUM 40 MG/10ML
40 INJECTION, POWDER, LYOPHILIZED, FOR SOLUTION INTRAVENOUS ONCE
Status: COMPLETED | OUTPATIENT
Start: 2024-10-07 | End: 2024-10-07

## 2024-10-07 RX ADMIN — PANTOPRAZOLE SODIUM 40 MG: 40 INJECTION, POWDER, FOR SOLUTION INTRAVENOUS at 14:50

## 2024-10-07 RX ADMIN — SUCRALFATE 1 G: 1 TABLET ORAL at 14:49

## 2024-10-07 RX ADMIN — ALUMINUM HYDROXIDE, MAGNESIUM HYDROXIDE, AND DIMETHICONE 30 ML: 200; 20; 200 SUSPENSION ORAL at 14:49

## 2024-10-07 RX ADMIN — IOHEXOL 100 ML: 350 INJECTION, SOLUTION INTRAVENOUS at 15:00

## 2024-10-07 NOTE — ED PROVIDER NOTES
Final diagnoses:   Atypical chest pain   Epigastric pain     ED Disposition       ED Disposition   Discharge    Condition   Stable    Date/Time   Mon Oct 7, 2024  3:53 PM    Comment   Brodie Dutton discharge to home/self care.                   Assessment & Plan       Medical Decision Making  This is a 48-year-old male patient presenting to the ED today for complaint of chest pain.  When examining him, it does seem to be more epigastric abdominal pain, which radiates up into the chest.  He has a previous history of esophageal diverticulum, status post resection.  He also has history of hypertension, hyperlipidemia as well as prostate cancer.  Patient states that his symptoms have been intermittent, lasting approximately 20 to 30 minutes and resolving spontaneously.  They are induced by food, sometimes it will be 30 minutes after food, sometimes it will be 2 hours after food.  He otherwise does not have any significantly associated symptoms.  His exam for the most part is unremarkable aside from tenderness to palpation epigastric region.  His differential diagnosis includes: Pancreatitis versus reflux esophagitis versus PUD versus other.  Patient had a CBC, metabolic panel, troponin x 2, lipase all of which were unremarkable.  He underwent a CT of the chest abdomen pelvis showing no significant acute abnormalities.  Patient felt better with Carafate, Protonix, and will follow-up as an outpatient with his PCP.  Given a gastroenterology referral.  The management plan was discussed in detail with the patient at bedside and all questions were answered. Strict ED return instructions were discussed at bedside. Prior to discharge, both verbal and written instructions were provided. We discussed the signs and symptoms that should prompt the patient to return to the ED. All questions were answered and the patient was comfortable with the plan of care and discharged home. The patient agrees to return to the Emergency  "Department for concerns and/or progression of illness.    Portions of the above record have been created with voice recognition software.  Occasional wrong word or \"sound alike\" substitutions may have occurred due to the inherent limitations of voice recognition software.  Read the chart carefully and recognize, using context, where substitutions may have occurred.    Amount and/or Complexity of Data Reviewed  External Data Reviewed: labs, radiology, ECG and notes.  Labs: ordered.  Radiology: ordered.    Risk  OTC drugs.  Prescription drug management.             Medications   aluminum-magnesium hydroxide-simethicone (MAALOX) oral suspension 30 mL (30 mL Oral Given 10/7/24 1449)   sucralfate (CARAFATE) tablet 1 g (1 g Oral Given 10/7/24 1449)   pantoprazole (PROTONIX) injection 40 mg (40 mg Intravenous Given 10/7/24 1450)   iohexol (OMNIPAQUE) 350 MG/ML injection (MULTI-DOSE) 100 mL (100 mL Intravenous Given 10/7/24 1500)       ED Risk Strat Scores   HEART Risk Score      Flowsheet Row Most Recent Value   Heart Score Risk Calculator    History 0 Filed at: 10/07/2024 1557   ECG 1 Filed at: 10/07/2024 1557   Age 1 Filed at: 10/07/2024 1557   Risk Factors 1 Filed at: 10/07/2024 1557   Troponin 0 Filed at: 10/07/2024 1557   HEART Score 3 Filed at: 10/07/2024 1557                                                   History of Present Illness       Chief Complaint   Patient presents with    Chest Pain     Reports intermittent episodes of midsternal chest pain. These episodes started Saturday. Notes worsened with eating. +N/SOB/D/generalized abdominal pain. No blood stool.        Past Medical History:   Diagnosis Date    Anxiety     Arthritis     \"hips and knees\"    Asthma     per pt \"only if has a cold\"    Bipolar disorder (HCC)     Blood in the stool     per pt \"last time had this 2 mths ago or so\"    Cancer (HCC) 2014    prostate    Chronic pain disorder     per pt \"from the Lupron especially lower body\"    Colon polyp  "    Cracked tooth     lower back right side tooth    Depression     Diverticulosis     Ear problems 5/4/2000    Wax build up    Exercises 3 to 4 times per week     treadmill 30 min--light lifting    GERD (gastroesophageal reflux disease)     History of psychiatric treatment     History of therapeutic radiation 2015    x 36 rounds    Hyperlipidemia     Hypertension     Teeth missing     Wears glasses       Past Surgical History:   Procedure Laterality Date    COLONOSCOPY      FRACTURE SURGERY Left     wrist    INGUINAL HERNIA REPAIR  08/01/2023    PENILE PROSTHESIS IMPLANT      NV ESOPHAGOGASTRODUODENOSCOPY TRANSORAL DIAGNOSTIC N/A 02/03/2023    Procedure: ESOPHAGOGASTRODUODENOSCOPY (EGD);  Surgeon: Jose Iraheta MD;  Location: [facility];  Service: Thoracic    NV ESOPHAGOGASTRODUODENOSCOPY TRANSORAL DIAGNOSTIC N/A 11/2/2023    Procedure: ESOPHAGOGASTRODUODENOSCOPY (EGD);  Surgeon: Jose Iraheta MD;  Location: [facility];  Service: Thoracic    NV LAPS ESOPHAGOMYOTOMY W/FUNDOPLASTY IF PERFORMED N/A 02/03/2023    Procedure: robotic assisted laparoscopic esophageal myotomy w/ partial fundoplication;  Surgeon: Jose Iraheta MD;  Location: [facility];  Service: Thoracic    PROSTATECTOMY  2014    UPPER GASTROINTESTINAL ENDOSCOPY      US GUIDED FINE NEEDLE ASPIRATION (ALL INC)  12/16/2014      Family History   Problem Relation Age of Onset    Cancer Other     Hypertension Other     Hyperlipidemia Other     Diabetes Maternal Grandmother     Hypertension Sister     Hypertension Brother       Social History     Tobacco Use    Smoking status: Never    Smokeless tobacco: Never   Vaping Use    Vaping status: Never Used   Substance Use Topics    Alcohol use: Not Currently    Drug use: Never      E-Cigarette/Vaping    E-Cigarette Use Never User       E-Cigarette/Vaping Substances      I have reviewed and agree with the history as documented.     This is a 48-year-old male patient with a relevant  past medical history of hypertension, hyperlipidemia, prostate cancer, history of esophageal diverticulum, status postresection approximately 1 year ago, presenting to the ED today for complaint of chest pain.  Patient states had his chest pain starts in the epigastrium, and radiates up into the substernal region.  He states that it is worse with eating, any type of movement.  He denies any fever, chills, nausea vomiting, hematemesis, hematochezia, or melanotic stools.  Has recently been here for cardiac workup, was hospitalized, had a stress test which showed low risk for cardiac ischemia.  His pain is 4-5 out of 10 in intensity, sharp in quality, lasting approximately 30 minutes after eating, without any other associated symptoms aside from what was mentioned above.        Review of Systems   Constitutional:  Negative for chills and fever.   HENT:  Negative for ear pain and sore throat.    Eyes:  Negative for pain and visual disturbance.   Respiratory:  Negative for cough and shortness of breath.    Cardiovascular:  Positive for chest pain. Negative for palpitations.   Gastrointestinal:  Positive for abdominal pain. Negative for vomiting.   Genitourinary:  Negative for dysuria and hematuria.   Musculoskeletal:  Negative for arthralgias and back pain.   Skin:  Negative for color change and rash.   Neurological:  Negative for seizures and syncope.   All other systems reviewed and are negative.          Objective       ED Triage Vitals   Temperature Pulse Blood Pressure Respirations SpO2 Patient Position - Orthostatic VS   10/07/24 1253 10/07/24 1251 10/07/24 1253 10/07/24 1251 10/07/24 1251 10/07/24 1251   98.1 °F (36.7 °C) 63 (!) 171/97 18 98 % Lying      Temp Source Heart Rate Source BP Location FiO2 (%) Pain Score    10/07/24 1253 10/07/24 1251 10/07/24 1251 -- 10/07/24 1251    Oral Monitor Left arm  5      Vitals      Date and Time Temp Pulse SpO2 Resp BP Pain Score FACES Pain Rating User   10/07/24 1330 -- 59  98 % 18 158/73 -- -- AB   10/07/24 1253 98.1 °F (36.7 °C) -- -- -- 171/97 5 -- EM   10/07/24 1251 -- 63 98 % 18 -- 5 -- EM            Physical Exam  Vitals and nursing note reviewed.   Constitutional:       General: He is not in acute distress.     Appearance: Normal appearance. He is well-developed and normal weight. He is not ill-appearing or toxic-appearing.   HENT:      Head: Normocephalic and atraumatic.      Right Ear: External ear normal.      Left Ear: External ear normal.      Nose: Nose normal. No congestion or rhinorrhea.      Mouth/Throat:      Mouth: Mucous membranes are moist.      Pharynx: Oropharynx is clear.   Eyes:      General: No scleral icterus.     Conjunctiva/sclera: Conjunctivae normal.   Cardiovascular:      Rate and Rhythm: Normal rate and regular rhythm.      Pulses: Normal pulses.      Heart sounds: Normal heart sounds. No murmur heard.  Pulmonary:      Effort: Pulmonary effort is normal. No respiratory distress.      Breath sounds: Normal breath sounds. No wheezing or rales.   Abdominal:      General: Abdomen is flat. Bowel sounds are normal. There is no distension.      Palpations: Abdomen is soft. There is no mass.      Tenderness: There is abdominal tenderness (Epigastric).   Musculoskeletal:         General: Normal range of motion.      Cervical back: Normal range of motion and neck supple. No tenderness.      Right lower leg: No edema.      Left lower leg: No edema.   Skin:     General: Skin is warm and dry.      Capillary Refill: Capillary refill takes less than 2 seconds.   Neurological:      General: No focal deficit present.      Mental Status: He is alert and oriented to person, place, and time. Mental status is at baseline.      Motor: No weakness.   Psychiatric:         Mood and Affect: Mood normal.         Behavior: Behavior normal.         Thought Content: Thought content normal.         Judgment: Judgment normal.         Results Reviewed       Procedure Component Value  Units Date/Time    HS Troponin I 2hr [[id number]]  (Normal) Collected: 10/07/24 1450    Lab Status: Final result Specimen: Blood from Arm, Right Updated: 10/07/24 1527     hs TnI 2hr 4 ng/L      Delta 2hr hsTnI >2 ng/L     HS Troponin I 4hr [[id number]]     Lab Status: No result Specimen: Blood     HS Troponin 0hr (reflex protocol) [[id number]]  (Normal) Collected: 10/07/24 1253    Lab Status: Final result Specimen: Blood from Arm, Right Updated: 10/07/24 1338     hs TnI 0hr <2 ng/L     Comprehensive metabolic panel [[id number]] Collected: 10/07/24 1253    Lab Status: Final result Specimen: Blood from Arm, Right Updated: 10/07/24 1330     Sodium 138 mmol/L      Potassium 4.0 mmol/L      Chloride 106 mmol/L      CO2 28 mmol/L      ANION GAP 4 mmol/L      BUN 11 mg/dL      Creatinine 1.02 mg/dL      Glucose 83 mg/dL      Calcium 9.0 mg/dL      AST 25 U/L      ALT 29 U/L      Alkaline Phosphatase 58 U/L      Total Protein 7.1 g/dL      Albumin 4.2 g/dL      Total Bilirubin 0.43 mg/dL      eGFR 85 ml/min/1.73sq m     Narrative:      [employer] guidelines for Chronic Kidney Disease (CKD):     Stage 1 with normal or high GFR (GFR > 90 mL/min/1.73 square meters)    Stage 2 Mild CKD (GFR = 60-89 mL/min/1.73 square meters)    Stage 3A Moderate CKD (GFR = 45-59 mL/min/1.73 square meters)    Stage 3B Moderate CKD (GFR = 30-44 mL/min/1.73 square meters)    Stage 4 Severe CKD (GFR = 15-29 mL/min/1.73 square meters)    Stage 5 End Stage CKD (GFR <15 mL/min/1.73 square meters)  Note: GFR calculation is accurate only with a steady state creatinine    Lipase [[id number]]  (Normal) Collected: 10/07/24 1253    Lab Status: Final result Specimen: Blood from Arm, Right Updated: 10/07/24 1330     Lipase 34 u/L     CBC and differential [[id number]]  (Abnormal) Collected: 10/07/24 1253    Lab Status: Final result Specimen: Blood from Arm, Right Updated: 10/07/24 1303     WBC 6.30 Thousand/uL      RBC 5.08 Million/uL       Hemoglobin 13.3 g/dL      Hematocrit 41.5 %      MCV 82 fL      MCH 26.2 pg      MCHC 32.0 g/dL      RDW 15.7 %      MPV 9.9 fL      Platelets 278 Thousands/uL      nRBC 0 /100 WBCs      Segmented % 57 %      Immature Grans % 0 %      Lymphocytes % 35 %      Monocytes % 7 %      Eosinophils Relative 1 %      Basophils Relative 0 %      Absolute Neutrophils 3.52 Thousands/µL      Absolute Immature Grans 0.01 Thousand/uL      Absolute Lymphocytes 2.21 Thousands/µL      Absolute Monocytes 0.45 Thousand/µL      Eosinophils Absolute 0.09 Thousand/µL      Basophils Absolute 0.02 Thousands/µL             CT chest abdomen pelvis w contrast   Final Interpretation by Dixon Lock MD (10/07 1549)      No acute abnormality in chest, abdomen, or pelvis.      Unchanged small hiatal hernia with stable postsurgical changes in distal thoracic esophagus extending to gastroesophageal junction.      Unchanged trace pelvic ascites.      Additional chronic/incidental findings as detailed above.               Workstation performed: [id number]         XR chest 2 views   Final Interpretation by Dmitri Mcfarland MD (10/07 1506)      No acute cardiopulmonary disease.            Workstation performed: [id number]             Procedures    ED Medication and Procedure Management   Prior to Admission Medications   Prescriptions Last Dose Informant Patient Reported? Taking?   MELATONIN PO   Yes No   Sig: Take 20 mg by mouth daily   acetaminophen (TYLENOL) 650 mg CR tablet   Yes No   Sig: TAKE 1 TABLET BY MOUTH 4 TIMES A DAY FOR 3 DAYS, THEN 4 TIMES A DAY AS NEEDED FOR 4 DAYS.   albuterol (PROVENTIL HFA,VENTOLIN HFA) 90 mcg/act inhaler   Yes No   Sig: INHALE 2 PUFFS BY MOUTH EVERY 6 HOURS AS NEEDED FOR WHEEZE   amLODIPine (NORVASC) 10 mg tablet  Self Yes No   Sig: Take 10 mg by mouth every morning   atorvastatin (LIPITOR) 20 mg tablet  Self Yes No   Sig: Take 20 mg by mouth daily at bedtime   divalproex sodium (DEPAKOTE ER) 500 mg 24 hr  tablet  Self Yes No   Si tab po qam and 2 tabs po qhs   leuprolide (LUPRON DEPOT 3 MONTH KIT) 22.5 mg injection  Self Yes No   Sig: Inject 22.5 mg into a muscle every 6 (six) months Per pt next dose end of 2023   meloxicam (MOBIC) 15 mg tablet   Yes No   Sig: Take 15 mg by mouth daily   methocarbamol (ROBAXIN) 750 mg tablet   Yes No   Sig: Take 750 mg by mouth every 6 (six) hours as needed for muscle spasms   valproic acid (DEPAKENE) 250 MG/5ML soln   Yes No   venlafaxine (EFFEXOR) 37.5 mg tablet   Yes No      Facility-Administered Medications: None     Patient's Medications   Discharge Prescriptions    PANTOPRAZOLE (PROTONIX) 40 MG TABLET    Take 1 tablet (40 mg total) by mouth 2 (two) times a day       Start Date: 10/7/2024 End Date: 2024       Order Dose: 40 mg       Quantity: 60 tablet    Refills: 0    SUCRALFATE (CARAFATE) 1 G TABLET    Take 1 tablet (1 g total) by mouth 4 (four) times a day for 120 doses       Start Date: 10/7/2024 End Date: 2024       Order Dose: 1 g       Quantity: 120 tablet    Refills: 0       ED SEPSIS DOCUMENTATION   Time reflects when diagnosis was documented in both MDM as applicable and the Disposition within this note       Time User Action Codes Description Comment    10/7/2024  3:53 PM Hailey Landry [R07.89] Atypical chest pain     10/7/2024  3:53 PM Hailey Landry [R10.13] Epigastric pain                  Hailey Landry MD  10/07/24 [id number]

## 2024-10-07 NOTE — DISCHARGE INSTRUCTIONS
You were seen in the ED for abdominal pain as well as chest pain.  You had CT scan, blood work showing no concerning findings. You were diagnosed with likely inflammation in your stomach/esophagus.  You have been discharged with instructions to follow-up with gastroenterology and as an outpatient.  You also have been given a prescription for Carafate to be taken with meals and at bedtime, as well as Protonix to be taken twice daily.  Please follow up with your primary care physician within the next 1 week for continued management of your conditions.  Please come back to the ED if you develop uncontrolled pain, vomiting blood, black stools. Thank you very much for utilizing the ED this afternoon.

## 2024-10-24 ENCOUNTER — OFFICE VISIT (OUTPATIENT)
Dept: GASTROENTEROLOGY | Facility: CLINIC | Age: 49
End: 2024-10-24
Payer: COMMERCIAL

## 2024-10-24 VITALS
HEIGHT: 71 IN | SYSTOLIC BLOOD PRESSURE: 128 MMHG | DIASTOLIC BLOOD PRESSURE: 90 MMHG | BODY MASS INDEX: 34.3 KG/M2 | TEMPERATURE: 97.6 F | WEIGHT: 245 LBS

## 2024-10-24 DIAGNOSIS — K59.04 CHRONIC IDIOPATHIC CONSTIPATION: ICD-10-CM

## 2024-10-24 DIAGNOSIS — R10.13 EPIGASTRIC PAIN: Primary | ICD-10-CM

## 2024-10-24 DIAGNOSIS — Q39.6 ESOPHAGEAL DIVERTICULUM: ICD-10-CM

## 2024-10-24 DIAGNOSIS — R07.89 ATYPICAL CHEST PAIN: ICD-10-CM

## 2024-10-24 PROCEDURE — 99204 OFFICE O/P NEW MOD 45 MIN: CPT | Performed by: PHYSICIAN ASSISTANT

## 2024-10-24 RX ORDER — ESOMEPRAZOLE MAGNESIUM 40 MG/1
40 CAPSULE, DELAYED RELEASE ORAL
Qty: 60 CAPSULE | Refills: 2 | Status: SHIPPED | OUTPATIENT
Start: 2024-10-24

## 2024-10-24 RX ORDER — POLYETHYLENE GLYCOL 3350 17 G/17G
17 POWDER, FOR SOLUTION ORAL DAILY
Qty: 100 EACH | Refills: 2 | Status: SHIPPED | OUTPATIENT
Start: 2024-10-24

## 2024-10-24 NOTE — PATIENT INSTRUCTIONS
Take nexium twice/day: 30 minutes before breakfast and 30 minutes before dinner  When you wake up: take thew nexium with a full glass of water, wait 30 minutes, eat, then have your tea or coffee   Do not eat lay at night. Stay upright for at least 2 hours before reclining or laying down   Start using carafate liquid as needed: you can even take this four times/day (works best on an empty stomach)  Make sure you continue to drink at least 64 ounces of water/day  Start using miralax daily as needed for constipation     Scheduled date of EGD(as of today): 10/30/24  Physician performing EGD: Dr. West  Location of EGD: [place]  Instructions reviewed with patient by: Bhavani  Clearances: none

## 2024-10-24 NOTE — ASSESSMENT & PLAN NOTE
Pt is s/p diverticulectomy with myotomy and partial fundoplication. See above.   Orders:    EGD; Future    esomeprazole (NexIUM) 40 MG capsule; Take 1 capsule (40 mg total) by mouth 2 (two) times a day before meals 30 minutes before breakfast and 30 minutes before dinner

## 2024-10-24 NOTE — PROGRESS NOTES
"Ambulatory Visit  Name: Brodie Dutton      : 1975      MRN: [id number]  Encounter Provider: Lacey Amaral PA-C  Encounter Date: 10/24/2024   Encounter department: [facility] [place] EL[place] Calloway is a 47 y/o male with esophageal epiphrenic diverticulum s/p robotic resection with heller's myotomy and partial fundoplication 2023 and prostate cancer s/p prostatectomy who presents for ED follow-up.   Assessment & Plan  Epigastric pain  Patient says that ever since he underwent the surgery , he has been having issues with epigastric pain that will radiate down into his chest and throughout his generalized abdomen.  He says that initially after the surgeries when he was on more of a liquid diet, he was not having any issues however once he started eating solid food is when the pain began again.  He says that he is getting frustrated at this time as he feels the surgeon's wrapped his esophagus too tightly and he feels this is the cause of his problems.He denies vomiting but says he has been getting nauseous when he gets the pain, which is daily now. pt was following with CT surgery after his myotomy and fundoplication 2023. They were seeing him for epigastric pain and dysphagia with barium swallow last October noting \"Paraesophageal gastric lumen most likely represents partially unwrapped fundoplication. \" Their team evaluated with EGD, which noted \"normal\" post-surgical changes but pathology was not taken. However, pt's symptoms continued and presented to the ED earlier this month with chest/epigastric pain. CXR WNL. CT WNL and noted \"Unchanged small hiatal hernia with stable postsurgical changes in distal thoracic esophagus extending to gastroesophageal junction. \" LFTs, lipase, CBC, and cardiac work-up WNL.  He and his wife note that there are some days that go by where he barely eats anything due to fear of the pain. He says that he would be willing to return " to the surgery team to discuss his options but would like to proceed with EGD first. Pt does admit to being constipated in that he can skip 1-2 days/week without moving his bowels and when he does move them, he usually strains and only evacuates small amounts. He says he has been on protonix 40 mg BID for the last 2-3 weeks without relief.   -explained to pt that certainly, this can be due to post-surgical changes and complications but I do agree with undergoing EGD first: not only will this allow us to evaluate his UGI tract and rule out any complications from the surgeries, but this will also allow us to rule out H.pylori, ulcer disease, erosions. I also explained that being constipated can also be a component of his symptoms so we should get a better handle of this at this time   Orders:    Ambulatory Referral to Gastroenterology    EGD; Future    esomeprazole (NexIUM) 40 MG capsule; Take 1 capsule (40 mg total) by mouth 2 (two) times a day before meals 30 minutes before breakfast and 30 minutes before dinner  -stop protonix  -please ensure you are drinking at least 64 ounces of water/day  -start miralax daily-BID PRN constipation     -follow-up with surgery team: pt says he may want a second opinion, depending on what the EGD shows   Esophageal diverticulum  Pt is s/p diverticulectomy with myotomy and partial fundoplication. See above.   Orders:    EGD; Future    esomeprazole (NexIUM) 40 MG capsule; Take 1 capsule (40 mg total) by mouth 2 (two) times a day before meals 30 minutes before breakfast and 30 minutes before dinner    Chronic idiopathic constipation  See above.   Orders:    polyethylene glycol (MIRALAX) 17 g packet; Take 17 g by mouth daily      History of Present Illness     Brodie Dutton is a 48 y.o. male who presents for follow-up. Patient says that ever since he underwent the surgery 2023, he has been having issues with epigastric pain that will radiate down into his chest and throughout his  generalized abdomen.  He says that initially after the surgeries when he was on more of a liquid diet, he was not having any issues however once he started eating solid food is when the pain began again.  He says that he is getting frustrated at this time as he feels the surgeon's wrapped his esophagus too tightly and he feels this is the cause of his problems. He denies vomiting but says he has been getting nauseous when he gets the pain. He and his wife note that there are some days that go by where he barely eats anything due to fear of the pain. He says that he would be willing to return to the surgery team to discuss his options but would like to proceed with EGD first. Pt does admit to being constipated in that he can skip 1-2 days/week without moving his bowels and when he does move them, he usually strains and only evacuates small amounts. He says he has been on protonix 40 mg BID for the last 2-3 weeks without relief.  He denies family history of colon cancer, diarrhea, NSAID use, bloody or black stools.  Patient is not on blood thinners.  Other than his above surgeries, patient has undergone inguinal hernia repair and prostatectomy.    History obtained from : patient and patient's Significant Other  Review of Systems   Constitutional:  Negative for chills and fever.   HENT:  Negative for ear pain and sore throat.    Eyes:  Negative for pain and visual disturbance.   Respiratory:  Negative for cough and shortness of breath.    Cardiovascular:  Positive for chest pain. Negative for palpitations.   Gastrointestinal:  Positive for abdominal pain and constipation. Negative for vomiting.   Genitourinary:  Negative for dysuria and hematuria.   Musculoskeletal:  Negative for arthralgias and back pain.   Skin:  Negative for color change and rash.   Neurological:  Negative for seizures and syncope.   All other systems reviewed and are negative.    Medical History Reviewed by provider this encounter:       Past  "Medical History   Past Medical History:   Diagnosis Date    Anxiety     Arthritis     \"hips and knees\"    Asthma     per pt \"only if has a cold\"    Bipolar disorder (HCC)     Blood in the stool     per pt \"last time had this 2 mths ago or so\"    Cancer (HCC) 2014    prostate    Chronic pain disorder     per pt \"from the Lupron especially lower body\"    Colon polyp     Cracked tooth     lower back right side tooth    Depression     Diverticulosis     Ear problems 5/4/2000    Wax build up    Exercises 3 to 4 times per week     treadmill 30 min--light lifting    GERD (gastroesophageal reflux disease)     History of psychiatric treatment     History of therapeutic radiation 2015    x 36 rounds    Hyperlipidemia     Hypertension     Teeth missing     Wears glasses      Past Surgical History:   Procedure Laterality Date    COLONOSCOPY      FRACTURE SURGERY Left     wrist    INGUINAL HERNIA REPAIR  08/01/2023    PENILE PROSTHESIS IMPLANT      DC ESOPHAGOGASTRODUODENOSCOPY TRANSORAL DIAGNOSTIC N/A 02/03/2023    Procedure: ESOPHAGOGASTRODUODENOSCOPY (EGD);  Surgeon: Jose Iraheta MD;  [facility];  Service: Thoracic    DC ESOPHAGOGASTRODUODENOSCOPY TRANSORAL DIAGNOSTIC N/A 11/2/2023    Procedure: ESOPHAGOGASTRODUODENOSCOPY (EGD);  Surgeon: Jose Iraheta MD;  [facility];  Service: Thoracic    DC LAPS ESOPHAGOMYOTOMY W/FUNDOPLASTY IF PERFORMED N/A 02/03/2023    Procedure: robotic assisted laparoscopic esophageal myotomy w/ partial fundoplication;  Surgeon: Jose Iraheta MD;  [facility];  Service: Thoracic    PROSTATECTOMY  2014    UPPER GASTROINTESTINAL ENDOSCOPY      US GUIDED FINE NEEDLE ASPIRATION (ALL INC)  12/16/2014     Family History   Problem Relation Age of Onset    Cancer Other     Hypertension Other     Hyperlipidemia Other     Diabetes Maternal Grandmother     Hypertension Sister     Hypertension Brother      Current Outpatient Medications on File " Prior to Visit   Medication Sig Dispense Refill    acetaminophen (TYLENOL) 650 mg CR tablet TAKE 1 TABLET BY MOUTH 4 TIMES A DAY FOR 3 DAYS, THEN 4 TIMES A DAY AS NEEDED FOR 4 DAYS.      albuterol (PROVENTIL HFA,VENTOLIN HFA) 90 mcg/act inhaler INHALE 2 PUFFS BY MOUTH EVERY 6 HOURS AS NEEDED FOR WHEEZE      amLODIPine (NORVASC) 10 mg tablet Take 10 mg by mouth every morning      atorvastatin (LIPITOR) 20 mg tablet Take 20 mg by mouth daily at bedtime      divalproex sodium (DEPAKOTE ER) 500 mg 24 hr tablet 2 tab po qam and 2 tabs po qhs      leuprolide (LUPRON DEPOT 3 MONTH KIT) 22.5 mg injection Inject 22.5 mg into a muscle every 6 (six) months Per pt next dose end of 2/2023      MELATONIN PO Take 20 mg by mouth daily      meloxicam (MOBIC) 15 mg tablet Take 15 mg by mouth daily      methocarbamol (ROBAXIN) 750 mg tablet Take 750 mg by mouth every 6 (six) hours as needed for muscle spasms      pantoprazole (PROTONIX) 40 mg tablet Take 1 tablet (40 mg total) by mouth 2 (two) times a day 60 tablet 0    sucralfate (CARAFATE) 1 g tablet Take 1 tablet (1 g total) by mouth 4 (four) times a day for 120 doses 120 tablet 0    valproic acid (DEPAKENE) 250 MG/5ML soln       venlafaxine (EFFEXOR) 37.5 mg tablet        No current facility-administered medications on file prior to visit.     Allergies   Allergen Reactions    Amoxicillin Diarrhea, Other (See Comments) and Nausea Only     Fever, blood in stool    Lisinopril Swelling      Current Outpatient Medications on File Prior to Visit   Medication Sig Dispense Refill    acetaminophen (TYLENOL) 650 mg CR tablet TAKE 1 TABLET BY MOUTH 4 TIMES A DAY FOR 3 DAYS, THEN 4 TIMES A DAY AS NEEDED FOR 4 DAYS.      albuterol (PROVENTIL HFA,VENTOLIN HFA) 90 mcg/act inhaler INHALE 2 PUFFS BY MOUTH EVERY 6 HOURS AS NEEDED FOR WHEEZE      amLODIPine (NORVASC) 10 mg tablet Take 10 mg by mouth every morning      atorvastatin (LIPITOR) 20 mg tablet Take 20 mg by mouth daily at bedtime       divalproex sodium (DEPAKOTE ER) 500 mg 24 hr tablet 2 tab po qam and 2 tabs po qhs      leuprolide (LUPRON DEPOT 3 MONTH KIT) 22.5 mg injection Inject 22.5 mg into a muscle every 6 (six) months Per pt next dose end of 2/2023      MELATONIN PO Take 20 mg by mouth daily      meloxicam (MOBIC) 15 mg tablet Take 15 mg by mouth daily      methocarbamol (ROBAXIN) 750 mg tablet Take 750 mg by mouth every 6 (six) hours as needed for muscle spasms      pantoprazole (PROTONIX) 40 mg tablet Take 1 tablet (40 mg total) by mouth 2 (two) times a day 60 tablet 0    sucralfate (CARAFATE) 1 g tablet Take 1 tablet (1 g total) by mouth 4 (four) times a day for 120 doses 120 tablet 0    valproic acid (DEPAKENE) 250 MG/5ML soln       venlafaxine (EFFEXOR) 37.5 mg tablet        No current facility-administered medications on file prior to visit.      Social History     Tobacco Use    Smoking status: Never    Smokeless tobacco: Never   Vaping Use    Vaping status: Never Used   Substance and Sexual Activity    Alcohol use: Not Currently    Drug use: Never    Sexual activity: Yes     Partners: Female     Comment: defer         Objective     There were no vitals taken for this visit.    Physical Exam  Abdominal:      Tenderness: There is abdominal tenderness in the periumbilical area. There is no right CVA tenderness, left CVA tenderness, guarding or rebound. Negative signs include Verde's sign, Rovsing's sign, psoas sign and obturator sign.       Administrative Statements   I have spent a total time of 40 minutes in caring for this patient on the day of the visit/encounter including Diagnostic results, Prognosis, Risks and benefits of tx options, Instructions for management, Patient and family education, Importance of tx compliance, Risk factor reductions, Impressions, Counseling / Coordination of care, Documenting in the medical record, Reviewing / ordering tests, medicine, procedures  , Obtaining or reviewing history  , and Communicating  with other healthcare professionals .

## 2024-10-25 DIAGNOSIS — R10.13 EPIGASTRIC PAIN: ICD-10-CM

## 2024-10-25 DIAGNOSIS — R10.13 EPIGASTRIC PAIN: Primary | ICD-10-CM

## 2024-10-25 DIAGNOSIS — R07.89 ATYPICAL CHEST PAIN: ICD-10-CM

## 2024-10-25 DIAGNOSIS — Q39.6 ESOPHAGEAL DIVERTICULUM: ICD-10-CM

## 2024-10-25 RX ORDER — SUCRALFATE 1 G/1
1 TABLET ORAL 4 TIMES DAILY
Qty: 120 TABLET | Refills: 0 | Status: SHIPPED | OUTPATIENT
Start: 2024-10-25 | End: 2024-11-24

## 2024-10-25 RX ORDER — SUCRALFATE ORAL 1 G/10ML
1 SUSPENSION ORAL
Qty: 420 ML | Refills: 1 | Status: SHIPPED | OUTPATIENT
Start: 2024-10-25 | End: 2024-10-25

## 2024-10-30 ENCOUNTER — ANESTHESIA EVENT (OUTPATIENT)
Dept: GASTROENTEROLOGY | Facility: AMBULARY SURGERY CENTER | Age: 49
End: 2024-10-30
Payer: COMMERCIAL

## 2024-10-30 ENCOUNTER — HOSPITAL ENCOUNTER (OUTPATIENT)
Dept: GASTROENTEROLOGY | Facility: AMBULARY SURGERY CENTER | Age: 49
Setting detail: OUTPATIENT SURGERY
Discharge: HOME | End: 2024-10-30
Attending: INTERNAL MEDICINE
Payer: COMMERCIAL

## 2024-10-30 VITALS
HEART RATE: 55 BPM | DIASTOLIC BLOOD PRESSURE: 72 MMHG | RESPIRATION RATE: 18 BRPM | SYSTOLIC BLOOD PRESSURE: 130 MMHG | OXYGEN SATURATION: 99 % | TEMPERATURE: 96.5 F

## 2024-10-30 DIAGNOSIS — R10.13 EPIGASTRIC PAIN: ICD-10-CM

## 2024-10-30 DIAGNOSIS — Q39.6 ESOPHAGEAL DIVERTICULUM: ICD-10-CM

## 2024-10-30 DIAGNOSIS — R07.89 ATYPICAL CHEST PAIN: ICD-10-CM

## 2024-10-30 PROCEDURE — 88305 TISSUE EXAM BY PATHOLOGIST: CPT | Performed by: PATHOLOGY

## 2024-10-30 PROCEDURE — 43239 EGD BIOPSY SINGLE/MULTIPLE: CPT | Performed by: INTERNAL MEDICINE

## 2024-10-30 RX ORDER — LIDOCAINE HYDROCHLORIDE 10 MG/ML
INJECTION, SOLUTION EPIDURAL; INFILTRATION; INTRACAUDAL; PERINEURAL AS NEEDED
Status: DISCONTINUED | OUTPATIENT
Start: 2024-10-30 | End: 2024-10-30

## 2024-10-30 RX ORDER — PROPOFOL 10 MG/ML
INJECTION, EMULSION INTRAVENOUS AS NEEDED
Status: DISCONTINUED | OUTPATIENT
Start: 2024-10-30 | End: 2024-10-30

## 2024-10-30 RX ORDER — SODIUM CHLORIDE, SODIUM LACTATE, POTASSIUM CHLORIDE, CALCIUM CHLORIDE 600; 310; 30; 20 MG/100ML; MG/100ML; MG/100ML; MG/100ML
125 INJECTION, SOLUTION INTRAVENOUS CONTINUOUS
Status: DISCONTINUED | OUTPATIENT
Start: 2024-10-30 | End: 2024-11-03 | Stop reason: HOSPADM

## 2024-10-30 RX ORDER — SODIUM CHLORIDE, SODIUM LACTATE, POTASSIUM CHLORIDE, CALCIUM CHLORIDE 600; 310; 30; 20 MG/100ML; MG/100ML; MG/100ML; MG/100ML
INJECTION, SOLUTION INTRAVENOUS CONTINUOUS PRN
Status: DISCONTINUED | OUTPATIENT
Start: 2024-10-30 | End: 2024-10-30

## 2024-10-30 RX ADMIN — LIDOCAINE HYDROCHLORIDE 50 MG: 10 INJECTION, SOLUTION EPIDURAL; INFILTRATION; INTRACAUDAL; PERINEURAL at 11:51

## 2024-10-30 RX ADMIN — PROPOFOL 100 MG: 10 INJECTION, EMULSION INTRAVENOUS at 11:51

## 2024-10-30 RX ADMIN — PROPOFOL 50 MG: 10 INJECTION, EMULSION INTRAVENOUS at 11:53

## 2024-10-30 RX ADMIN — SODIUM CHLORIDE, SODIUM LACTATE, POTASSIUM CHLORIDE, AND CALCIUM CHLORIDE 125 ML/HR: .6; .31; .03; .02 INJECTION, SOLUTION INTRAVENOUS at 11:28

## 2024-10-30 RX ADMIN — PROPOFOL 50 MG: 10 INJECTION, EMULSION INTRAVENOUS at 11:58

## 2024-10-30 RX ADMIN — PROPOFOL 50 MG: 10 INJECTION, EMULSION INTRAVENOUS at 11:55

## 2024-10-30 RX ADMIN — PROPOFOL 50 MG: 10 INJECTION, EMULSION INTRAVENOUS at 11:57

## 2024-10-30 RX ADMIN — SODIUM CHLORIDE, SODIUM LACTATE, POTASSIUM CHLORIDE, AND CALCIUM CHLORIDE: .6; .31; .03; .02 INJECTION, SOLUTION INTRAVENOUS at 11:30

## 2024-10-30 NOTE — ANESTHESIA PREPROCEDURE EVALUATION
Procedure:  EGD    Relevant Problems   CARDIO   (+) HTN (hypertension)      GI/HEPATIC   (+) Dysphagia   (+) Gastroesophageal reflux disease   (+) Lower GI bleed      /RENAL   (+) Prostate cancer (HCC)      NEURO/PSYCH   (+) Chronic pain after cancer treatment   (+) Depression      PULMONARY   (+) Asthma      Behavioral Health   (+) Bipolar disorder (HCC)      Other   (+) Obesity (BMI 30-39.9)        Physical Exam    Airway    Mallampati score: II  TM Distance: >3 FB  Neck ROM: full     Dental   Comment: Denies loose teeth     Cardiovascular  Cardiovascular exam normal    Pulmonary  Pulmonary exam normal     Other Findings  Portions of exam deferred due to low yield and/or unknown COVID status      Anesthesia Plan  ASA Score- 2     Anesthesia Type- IV sedation with anesthesia with ASA Monitors.         Additional Monitors:     Airway Plan:            Plan Factors-Exercise tolerance (METS): >4 METS.    Chart reviewed.   Existing labs reviewed. Patient summary reviewed.    Patient is not a current smoker.              Induction- intravenous.    Postoperative Plan-         Informed Consent- Anesthetic plan and risks discussed with patient.  I personally reviewed this patient with the CRNA. Discussed and agreed on the Anesthesia Plan with the CRNA..

## 2024-10-30 NOTE — H&P
"History and Physical - [facility]  Brodie Dutton 48 y.o. male MRN: [id number]    HPI: Bordie Dutton is a 48 y.o. year old male who presents presents for evaluation of abdominal pain.      Review of Systems    Historical Information   Past Medical History:   Diagnosis Date    Anxiety     Arthritis     \"hips and knees\"    Asthma     per pt \"only if has a cold\"    Bipolar disorder (HCC)     Blood in the stool     per pt \"last time had this 2 mths ago or so\"    Cancer (HCC) 2014    prostate    Chronic pain disorder     per pt \"from the Lupron especially lower body\"    Colon polyp     Cracked tooth     lower back right side tooth    Depression     Diverticulosis     Ear problems 5/4/2000    Wax build up    Exercises 3 to 4 times per week     treadmill 30 min--light lifting    GERD (gastroesophageal reflux disease)     History of psychiatric treatment     History of therapeutic radiation 2015    x 36 rounds    Hyperlipidemia     Hypertension     Teeth missing     Wears glasses      Past Surgical History:   Procedure Laterality Date    COLONOSCOPY      FRACTURE SURGERY Left     wrist    INGUINAL HERNIA REPAIR  08/01/2023    PENILE PROSTHESIS IMPLANT      ID ESOPHAGOGASTRODUODENOSCOPY TRANSORAL DIAGNOSTIC N/A 02/03/2023    Procedure: ESOPHAGOGASTRODUODENOSCOPY (EGD);  Surgeon: Jose Iraheta MD;  [facility];  Service: Thoracic    ID ESOPHAGOGASTRODUODENOSCOPY TRANSORAL DIAGNOSTIC N/A 11/2/2023    Procedure: ESOPHAGOGASTRODUODENOSCOPY (EGD);  Surgeon: Jose Iraheta MD;  [facility];  Service: Thoracic    ID LAPS ESOPHAGOMYOTOMY W/FUNDOPLASTY IF PERFORMED N/A 02/03/2023    Procedure: robotic assisted laparoscopic esophageal myotomy w/ partial fundoplication;  Surgeon: Jose Iraheta MD;  [facility];  Service: Thoracic    PROSTATECTOMY  2014    UPPER GASTROINTESTINAL ENDOSCOPY      US GUIDED FINE NEEDLE ASPIRATION (ALL INC)  " 12/16/2014     Social History   Social History     Substance and Sexual Activity   Alcohol Use Not Currently     Social History     Substance and Sexual Activity   Drug Use Never     Social History     Tobacco Use   Smoking Status Never   Smokeless Tobacco Never     Family History   Problem Relation Age of Onset    Cancer Other     Hypertension Other     Hyperlipidemia Other     Diabetes Maternal Grandmother     Hypertension Sister     Hypertension Brother        Meds/Allergies     Not in a hospital admission.    Allergies   Allergen Reactions    Amoxicillin Diarrhea, Other (See Comments) and Nausea Only     Fever, blood in stool    Lisinopril Swelling       Objective     /87   Pulse 57   Temp (!) 96.5 °F (35.8 °C) (Temporal)   Resp 18   SpO2 98%       PHYSICAL EXAM    Gen: NAD  CV: RRR  CHEST: Clear  ABD: soft, NT/ND  EXT: no edema  Neuro: AAO      ASSESSMENT/PLAN:  This is a 48 y.o. year old male here for evaluation of epigastric abdominal pain.    PLAN:   Procedure: EGD.

## 2024-10-30 NOTE — ANESTHESIA POSTPROCEDURE EVALUATION
Post-Op Assessment Note    CV Status:  Stable  Pain Score: 0    Pain management: adequate       Mental Status:  Alert and awake   Hydration Status:  Euvolemic   PONV Controlled:  Controlled   Airway Patency:  Patent     Post Op Vitals Reviewed: Yes    No anethesia notable event occurred.    Staff: Anesthesiologist, CRNA           Last Filed PACU Vitals:  Vitals Value Taken Time   Temp     Pulse 67 10/30/24 1204   /58 10/30/24 1204   Resp 18 10/30/24 1204   SpO2 96 % 10/30/24 1204       Modified Meng:  Activity: 2 (10/30/2024 12:05 PM)  Respiration: 2 (10/30/2024 12:05 PM)  Circulation: 2 (10/30/2024 12:05 PM)  Consciousness: 1 (10/30/2024 12:05 PM)  Oxygen Saturation: 2 (10/30/2024 12:05 PM)  Modified Meng Score: 9 (10/30/2024 12:05 PM)

## 2024-11-04 PROCEDURE — 88305 TISSUE EXAM BY PATHOLOGIST: CPT | Performed by: PATHOLOGY

## 2024-11-15 DIAGNOSIS — Q39.6 ESOPHAGEAL DIVERTICULUM: ICD-10-CM

## 2024-11-15 DIAGNOSIS — R10.13 EPIGASTRIC PAIN: ICD-10-CM

## 2024-11-15 RX ORDER — ESOMEPRAZOLE MAGNESIUM 40 MG/1
40 CAPSULE, DELAYED RELEASE ORAL
Qty: 180 CAPSULE | Refills: 1 | Status: SHIPPED | OUTPATIENT
Start: 2024-11-15

## 2024-11-20 ENCOUNTER — TELEPHONE (OUTPATIENT)
Dept: GASTROENTEROLOGY | Facility: CLINIC | Age: 49
End: 2024-11-20

## 2024-11-20 NOTE — TELEPHONE ENCOUNTER
Reason for call: prior auth esomeprazole   [] Refill   [x] Prior Auth  [] Other:     Office: [facility]  [] PCP/Provider -   [x] Specialty/Provider - [facility]     Medication: esomeprazole     Dose/Frequency: 40 mg/ twice daily     Quantity: 90 day supply     Pharmacy: [facility]

## 2024-11-20 NOTE — TELEPHONE ENCOUNTER
PA for esomeprazole 40 mg BID    SUBMITTED to [facility]    via    [x]CMM-KEY: [id number]  []Surescripts-Case ID #   []Availity-Auth ID # NDC #   []Faxed to plan   []Other website   []Phone call Case ID #     []PA sent as URGENT    All office notes, labs and other pertaining documents and studies sent. Clinical questions answered. Awaiting determination from insurance company.     Turnaround time for your insurance to make a decision on your Prior Authorization can take 7-21 business days.                Complex Repair And Graft Additional Text (Will Appearing After The Standard Complex Repair Text): The complex repair was not sufficient to completely close the primary defect. The remaining additional defect was repaired with the graft mentioned below.

## 2024-11-25 NOTE — TELEPHONE ENCOUNTER
Called pharmacy  [facility] [id number]    ID [id number]    NAME OF INSURANCE Med D

## 2024-11-25 NOTE — TELEPHONE ENCOUNTER
PA for esomeprazole 40 mg BID    SUBMITTED to [facility]    via    [x]CMM-KEY: (Key: [id number])  PA Case ID #: [id number]  []Surescripts-Case ID #   []Availity-Auth ID # NDC #   []Faxed to plan   []Other website   []Phone call Case ID #     []PA sent as URGENT    All office notes, labs and other pertaining documents and studies sent. Clinical questions answered. Awaiting determination from insurance company.     Turnaround time for your insurance to make a decision on your Prior Authorization can take 7-21 business days.

## 2024-12-05 DIAGNOSIS — R07.89 ATYPICAL CHEST PAIN: ICD-10-CM

## 2024-12-05 DIAGNOSIS — R10.13 EPIGASTRIC PAIN: ICD-10-CM

## 2024-12-05 RX ORDER — SUCRALFATE 1 G/1
1 TABLET ORAL 4 TIMES DAILY
Qty: 120 TABLET | Refills: 2 | Status: SHIPPED | OUTPATIENT
Start: 2024-12-05 | End: 2025-01-04

## 2025-01-28 DIAGNOSIS — R10.13 EPIGASTRIC PAIN: ICD-10-CM

## 2025-01-28 DIAGNOSIS — R07.89 ATYPICAL CHEST PAIN: ICD-10-CM

## 2025-01-28 RX ORDER — SUCRALFATE 1 G/1
1 TABLET ORAL 4 TIMES DAILY
Qty: 120 TABLET | Refills: 0 | Status: SHIPPED | OUTPATIENT
Start: 2025-01-28 | End: 2025-02-27

## 2025-02-18 ENCOUNTER — OFFICE VISIT (OUTPATIENT)
Dept: GASTROENTEROLOGY | Facility: CLINIC | Age: 50
End: 2025-02-18
Payer: COMMERCIAL

## 2025-02-18 VITALS
HEIGHT: 71 IN | WEIGHT: 256 LBS | SYSTOLIC BLOOD PRESSURE: 130 MMHG | TEMPERATURE: 97.6 F | DIASTOLIC BLOOD PRESSURE: 80 MMHG | BODY MASS INDEX: 35.84 KG/M2

## 2025-02-18 DIAGNOSIS — Q39.6 ESOPHAGEAL DIVERTICULUM: Primary | ICD-10-CM

## 2025-02-18 DIAGNOSIS — R68.81 EARLY SATIETY: ICD-10-CM

## 2025-02-18 DIAGNOSIS — K64.8 INTERNAL HEMORRHOID: ICD-10-CM

## 2025-02-18 DIAGNOSIS — R10.13 DYSPEPSIA: ICD-10-CM

## 2025-02-18 DIAGNOSIS — K59.09 CHRONIC CONSTIPATION: ICD-10-CM

## 2025-02-18 PROCEDURE — 99214 OFFICE O/P EST MOD 30 MIN: CPT | Performed by: PHYSICIAN ASSISTANT

## 2025-02-18 RX ORDER — HYDROCORTISONE ACETATE 25 MG/1
25 SUPPOSITORY RECTAL 2 TIMES DAILY
Qty: 12 SUPPOSITORY | Refills: 0 | Status: SHIPPED | OUTPATIENT
Start: 2025-02-18

## 2025-02-18 NOTE — ASSESSMENT & PLAN NOTE
Pt is s/p diverticulectomy with myotomy and partial fundoplication 2/2023 by the CT surgery team.

## 2025-02-18 NOTE — PROGRESS NOTES
Name: Brodie Dutton      : 1975      MRN: [id number]  Encounter Provider: Lacey Amaral PA-C  Encounter Date: 2025   Encounter department: [facility] [place]  :  Assessment & Plan  Esophageal diverticulum  Pt is s/p diverticulectomy with myotomy and partial fundoplication 2023 by the CT surgery team.         Early satiety  He underwent an EGD by our team for atypical chest pain but the EGD was essentially normal noting mild erythema in the prepyloric region however duodenal biopsies negative for celiac, stomach biopsies negative for H. pylori, esophageal biopsies within normal limits. CT CAP 10/2024 done for this pain was essentially normal.  Today, the patient says that he continues to have daily issues with gas and bloating, early fullness and lack of appetite, and when he does eat he says that not only does he feel full very quickly but he starts to feel discomfort in his generalized abdominal area.  He says he has been using MiraLAX as needed for constipation, which does help, but the symptoms return immediately after he moves his bowels.  He says that being on PPI daily and twice daily have not helped.  He also notes that Carafate has not helped.  -Stop PPI  -Stop Carafate  -Start over-the-counter Beano with meals  -Start gastroparesis diet to see if this helps his symptoms   -In the meantime, we will also be getting a gastric emptying study for the patient to rule out gastroparesis    -If the gastric emptying study is normal but he continues with his symptoms then I would recommend he undergo SIBO testing and would also recommend he follows up with the CT surgery team at that time  Orders:    NM gastric emptying; Future    Internal hemorrhoid  The patient says that he gets bright red blood with wiping once in a while due to his hemorrhoids.  He was wondering if there could be something sent in for that.  Orders:    hydrocortisone (ANUSOL-HC) 25 mg  "suppository; Insert 1 suppository (25 mg total) into the rectum 2 (two) times a day    Chronic constipation  Patient says he is taking MiraLAX as needed with relief.    -Continue MiraLAX as needed         Dyspepsia  See above.  -Start over-the-counter Beano with meals  -Start gastroparesis diet to see if this helps his symptoms   -In the meantime, we will also be getting a gastric emptying study for the patient to rule out gastroparesis           History of Present Illness   HPI  Brodie Dutton is a 49 y.o. male who presents for follow-up.the patient says that he continues to have daily issues with gas and bloating, early fullness and lack of appetite, and when he does eat he says that not only does he feel full very quickly but he starts to feel discomfort in his generalized abdominal area.  He says he has been using MiraLAX as needed for constipation, which does help, but the symptoms return immediately after he moves his bowels.  He says that being on PPI daily and twice daily have not helped.  He also notes that Carafate has not helped. The patient says that he gets bright red blood with wiping once in a while due to his hemorrhoids.  He denies n/v, frequent trouble swallowing, bloody or black BM.   History obtained from: patient    Review of Systems   Constitutional:  Negative for appetite change, chills, diaphoresis, fatigue, fever and unexpected weight change.   HENT:  Negative for trouble swallowing.    Gastrointestinal:  Positive for abdominal distention, abdominal pain, anal bleeding and nausea. Negative for blood in stool, constipation, diarrhea, rectal pain and vomiting.   Neurological:  Negative for dizziness and light-headedness.     Medical History Reviewed by provider this encounter:     .  Past Medical History   Past Medical History:   Diagnosis Date    Anxiety     Arthritis     \"hips and knees\"    Asthma     per pt \"only if has a cold\"    Bipolar disorder (HCC)     Blood in the stool     per " "pt \"last time had this 2 mths ago or so\"    Cancer (HCC) 2014    prostate    Chronic pain disorder     per pt \"from the Lupron especially lower body\"    Colon polyp     Cracked tooth     lower back right side tooth    Depression     Diverticulosis     Ear problems 5/4/2000    Wax build up    Exercises 3 to 4 times per week     treadmill 30 min--light lifting    GERD (gastroesophageal reflux disease)     History of psychiatric treatment     History of therapeutic radiation 2015    x 36 rounds    Hyperlipidemia     Hypertension     Teeth missing     Wears glasses      Past Surgical History:   Procedure Laterality Date    COLONOSCOPY      FRACTURE SURGERY Left     wrist    INGUINAL HERNIA REPAIR  08/01/2023    PENILE PROSTHESIS IMPLANT      MD ESOPHAGOGASTRODUODENOSCOPY TRANSORAL DIAGNOSTIC N/A 02/03/2023    Procedure: ESOPHAGOGASTRODUODENOSCOPY (EGD);  Surgeon: Jose Iraheta MD;  Location: [facility];  Service: Thoracic    MD ESOPHAGOGASTRODUODENOSCOPY TRANSORAL DIAGNOSTIC N/A 11/2/2023    Procedure: ESOPHAGOGASTRODUODENOSCOPY (EGD);  Surgeon: Jose Iraheta MD;  Location: [facility];  Service: Thoracic    MD LAPS ESOPHAGOMYOTOMY W/FUNDOPLASTY IF PERFORMED N/A 02/03/2023    Procedure: robotic assisted laparoscopic esophageal myotomy w/ partial fundoplication;  Surgeon: Jose Iraheta MD;  Location: [facility];  Service: Thoracic    PROSTATECTOMY  2014    UPPER GASTROINTESTINAL ENDOSCOPY      US GUIDED FINE NEEDLE ASPIRATION (ALL INC)  12/16/2014     Family History   Problem Relation Age of Onset    Cancer Other     Hypertension Other     Hyperlipidemia Other     Diabetes Maternal Grandmother     Hypertension Sister     Hypertension Brother       reports that he has never smoked. He has never used smokeless tobacco. He reports that he does not currently use alcohol. He reports that he does not use drugs.  Current Outpatient Medications   Medication Instructions    acetaminophen " (TYLENOL) 650 mg CR tablet TAKE 1 TABLET BY MOUTH 4 TIMES A DAY FOR 3 DAYS, THEN 4 TIMES A DAY AS NEEDED FOR 4 DAYS.    albuterol (PROVENTIL HFA,VENTOLIN HFA) 90 mcg/act inhaler INHALE 2 PUFFS BY MOUTH EVERY 6 HOURS AS NEEDED FOR WHEEZE    amLODIPine (NORVASC) 10 mg, Every morning    atorvastatin (LIPITOR) 20 mg, Daily at bedtime    divalproex sodium (DEPAKOTE ER) 500 mg 24 hr tablet 2 tab po qam and 2 tabs po qhs    esomeprazole (NEXIUM) 40 mg, Oral, 2 times daily before meals, 30 minutes before breakfast and 30 minutes before dinner    hydrocortisone (ANUSOL-HC) 25 mg, Rectal, 2 times daily    leuprolide (LUPRON DEPOT 3 MONTH KIT) 22.5 mg, Every 6 months    MELATONIN PO 20 mg, Daily    pantoprazole (PROTONIX) 40 mg, Oral, 2 times daily    polyethylene glycol (MIRALAX) 17 g, Oral, Daily    venlafaxine (EFFEXOR) 37.5 mg tablet No dose, route, or frequency recorded.     Allergies   Allergen Reactions    Amoxicillin Diarrhea, Other (See Comments) and Nausea Only     Fever, blood in stool    Lisinopril Swelling      Current Outpatient Medications on File Prior to Visit   Medication Sig Dispense Refill    albuterol (PROVENTIL HFA,VENTOLIN HFA) 90 mcg/act inhaler INHALE 2 PUFFS BY MOUTH EVERY 6 HOURS AS NEEDED FOR WHEEZE      amLODIPine (NORVASC) 10 mg tablet Take 10 mg by mouth every morning      atorvastatin (LIPITOR) 20 mg tablet Take 20 mg by mouth daily at bedtime      divalproex sodium (DEPAKOTE ER) 500 mg 24 hr tablet 2 tab po qam and 2 tabs po qhs      esomeprazole (NexIUM) 40 MG capsule TAKE 1 CAPSULE (40 MG TOTAL) BY MOUTH 2 (TWO) TIMES A DAY BEFORE MEALS 30 MINUTES BEFORE BREAKFAST AND 30 MINUTES BEFORE DINNER 180 capsule 1    leuprolide (LUPRON DEPOT 3 MONTH KIT) 22.5 mg injection Inject 22.5 mg into a muscle every 6 (six) months Per pt next dose end of 2/2023      MELATONIN PO Take 20 mg by mouth daily      polyethylene glycol (MIRALAX) 17 g packet Take 17 g by mouth daily 100 each 2    venlafaxine (EFFEXOR)  37.5 mg tablet       [DISCONTINUED] sucralfate (CARAFATE) 1 g tablet TAKE 1 TABLET (1 G TOTAL) BY MOUTH 4 (FOUR) TIMES A DAY  DOSES 120 tablet 0    acetaminophen (TYLENOL) 650 mg CR tablet TAKE 1 TABLET BY MOUTH 4 TIMES A DAY FOR 3 DAYS, THEN 4 TIMES A DAY AS NEEDED FOR 4 DAYS.      pantoprazole (PROTONIX) 40 mg tablet Take 1 tablet (40 mg total) by mouth 2 (two) times a day 60 tablet 0     No current facility-administered medications on file prior to visit.      Social History     Tobacco Use    Smoking status: Never    Smokeless tobacco: Never   Vaping Use    Vaping status: Never Used   Substance and Sexual Activity    Alcohol use: Not Currently    Drug use: Never    Sexual activity: Yes     Partners: Female     Comment: defer        Objective   There were no vitals taken for this visit.     Physical Exam  Constitutional:       Appearance: Normal appearance.   Cardiovascular:      Rate and Rhythm: Normal rate and regular rhythm.      Heart sounds: Normal heart sounds.   Pulmonary:      Breath sounds: Normal breath sounds.   Abdominal:      General: Abdomen is flat. Bowel sounds are normal. There is no distension.      Palpations: Abdomen is soft. There is no mass.      Tenderness: There is no abdominal tenderness. There is no right CVA tenderness, left CVA tenderness, guarding or rebound.      Hernia: No hernia is present.   Neurological:      Mental Status: He is alert.         Administrative Statements   I have spent a total time of 30 minutes in caring for this patient on the day of the visit/encounter including Diagnostic results, Prognosis, Risks and benefits of tx options, Instructions for management, Patient and family education, Importance of tx compliance, Risk factor reductions, Impressions, Counseling / Coordination of care, Documenting in the medical record, Reviewing/placing orders in the medical record (including tests, medications, and/or procedures), Obtaining or reviewing history  , and  Communicating with other healthcare professionals .

## 2025-03-26 ENCOUNTER — HOSPITAL ENCOUNTER (OUTPATIENT)
Dept: NON INVASIVE DIAGNOSTICS | Facility: CLINIC | Age: 50
Discharge: HOME | End: 2025-03-26
Payer: COMMERCIAL

## 2025-03-26 ENCOUNTER — RESULTS FOLLOW-UP (OUTPATIENT)
Dept: GASTROENTEROLOGY | Facility: CLINIC | Age: 50
End: 2025-03-26

## 2025-03-26 DIAGNOSIS — R68.81 EARLY SATIETY: ICD-10-CM

## 2025-03-26 PROCEDURE — A9541 TC99M SULFUR COLLOID: HCPCS

## 2025-03-26 PROCEDURE — 78264 GASTRIC EMPTYING IMG STUDY: CPT

## 2025-06-13 ENCOUNTER — TELEPHONE (OUTPATIENT)
Dept: DENTISTRY | Facility: CLINIC | Age: 50
End: 2025-06-13

## (undated) DEVICE — TUBING SUCTION 5MM X 12 FT

## (undated) DEVICE — FIRST STEP BEDSIDE KIT - STAND-UP POUCH, ENDOSCOPIC CLEANING PAD - 1 POUCH: Brand: FIRST STEP BEDSIDE KIT - STAND-UP POUCH, ENDOSCOPIC CLEANING PAD

## (undated) DEVICE — GLOVE SRG BIOGEL ECLIPSE 7.5

## (undated) DEVICE — METZENBAUM ADTEC SINGLE USE DISSECTING SCISSORS, SHAFT ONLY, MONOPOLAR, CURVED TO LEFT, WORKING LENGTH: 12 1/4", (310 MM), DIAM. 5 MM, INSULATED, DOUBLE ACTION, STERILE, DISPOSABLE, PACKAGE OF 10 PIECES: Brand: AESCULAP

## (undated) DEVICE — GAUZE SPONGES,16 PLY: Brand: CURITY

## (undated) DEVICE — VISUALIZATION SYSTEM: Brand: CLEARIFY

## (undated) DEVICE — REDUCER: Brand: ENDOWRIST

## (undated) DEVICE — CATH FOLEY COUDE 16FR 5ML 2 WAY TIEMANN LUBRICATH

## (undated) DEVICE — SUT VICRYL 3-0 SH 27 IN J416H

## (undated) DEVICE — SUT VICRYL 0 REEL 54 IN J287G

## (undated) DEVICE — UTILITY MARKER,BLACK WITH LABELS: Brand: DEVON

## (undated) DEVICE — Device: Brand: DEFENDO AIR/WATER/SUCTION AND BIOPSY VALVE

## (undated) DEVICE — TISSUE RETRIEVAL SYSTEM: Brand: INZII RETRIEVAL SYSTEM

## (undated) DEVICE — SUT ETHIBOND 0 SH 30 IN X834H

## (undated) DEVICE — TROCAR: Brand: KII FIOS FIRST ENTRY

## (undated) DEVICE — SUREFORM 45 RELOAD BLUE: Brand: SUREFORM

## (undated) DEVICE — VESSEL SEALER EXTEND: Brand: ENDOWRIST

## (undated) DEVICE — GLOVE INDICATOR PI UNDERGLOVE SZ 8 BLUE

## (undated) DEVICE — CANNULA SEAL

## (undated) DEVICE — PERMANENT CAUTERY HOOK: Brand: ENDOWRIST

## (undated) DEVICE — INTENDED FOR TISSUE SEPARATION, AND OTHER PROCEDURES THAT REQUIRE A SHARP SURGICAL BLADE TO PUNCTURE OR CUT.: Brand: BARD-PARKER SAFETY BLADES SIZE 15, STERILE

## (undated) DEVICE — ADHESIVE SKIN HIGH VISCOSITY EXOFIN 1ML

## (undated) DEVICE — GAUZE ROLL KITTNER

## (undated) DEVICE — COLUMN DRAPE

## (undated) DEVICE — CHLORAPREP HI-LITE 26ML ORANGE

## (undated) DEVICE — SUT MONOCRYL 4-0 PS-2 18 IN Y496G

## (undated) DEVICE — HEAVY DUTY TABLE COVER: Brand: CONVERTORS

## (undated) DEVICE — SUT VLOC 90 3-0 V-20 9IN VLOCM0644

## (undated) DEVICE — CURVED BIPOLAR DISSECTOR: Brand: ENDOWRIST

## (undated) DEVICE — TRAY FOLEY 16FR URIMETER SILICONE SURESTEP

## (undated) DEVICE — Device: Brand: OMNICLOSE TROCAR SITE CLOSURE DEVICE

## (undated) DEVICE — AIR AND WATER TUBING/CAP SET FOR OLYMPUS® SCOPES: Brand: ERBE

## (undated) DEVICE — ENDOPATH PNEUMONEEDLE INSUFFLATION NEEDLES WITH LUER LOCK CONNECTORS 120MM: Brand: ENDOPATH

## (undated) DEVICE — ARM DRAPE

## (undated) DEVICE — SUREFORM 45: Brand: SUREFORM

## (undated) DEVICE — KIT, BETHLEHEM THORACIC ROBOT: Brand: CARDINAL HEALTH

## (undated) DEVICE — SUT ETHIBOND 2-0 SH/SH 36 IN X523H

## (undated) DEVICE — NEEDLE HYPO 22G X 1-1/2 IN

## (undated) DEVICE — TIP-UP FENESTRATED GRASPER: Brand: ENDOWRIST

## (undated) DEVICE — 60 ML SYRINGE,REGULAR TIP: Brand: MONOJECT

## (undated) DEVICE — SUREFORM 45 RELOAD WHITE: Brand: SUREFORM

## (undated) DEVICE — STRL PENROSE DRAIN 18" X 3/4": Brand: CARDINAL HEALTH

## (undated) DEVICE — CADIERE FORCEPS: Brand: ENDOWRIST